# Patient Record
Sex: MALE | Race: BLACK OR AFRICAN AMERICAN
[De-identification: names, ages, dates, MRNs, and addresses within clinical notes are randomized per-mention and may not be internally consistent; named-entity substitution may affect disease eponyms.]

---

## 2017-03-30 ENCOUNTER — HOSPITAL ENCOUNTER (INPATIENT)
Dept: HOSPITAL 17 - NEPC | Age: 72
LOS: 7 days | Discharge: HOME | DRG: 315 | End: 2017-04-06
Attending: HOSPITALIST | Admitting: HOSPITALIST
Payer: COMMERCIAL

## 2017-03-30 VITALS — OXYGEN SATURATION: 96 %

## 2017-03-30 VITALS
SYSTOLIC BLOOD PRESSURE: 131 MMHG | TEMPERATURE: 97.9 F | DIASTOLIC BLOOD PRESSURE: 63 MMHG | HEART RATE: 51 BPM | RESPIRATION RATE: 18 BRPM | OXYGEN SATURATION: 97 %

## 2017-03-30 VITALS
RESPIRATION RATE: 18 BRPM | OXYGEN SATURATION: 97 % | HEART RATE: 66 BPM | SYSTOLIC BLOOD PRESSURE: 137 MMHG | DIASTOLIC BLOOD PRESSURE: 81 MMHG

## 2017-03-30 VITALS — WEIGHT: 124.78 LBS | BODY MASS INDEX: 16.9 KG/M2 | HEIGHT: 72 IN

## 2017-03-30 VITALS
SYSTOLIC BLOOD PRESSURE: 130 MMHG | RESPIRATION RATE: 16 BRPM | DIASTOLIC BLOOD PRESSURE: 69 MMHG | OXYGEN SATURATION: 96 % | HEART RATE: 62 BPM | TEMPERATURE: 97.8 F

## 2017-03-30 VITALS — DIASTOLIC BLOOD PRESSURE: 85 MMHG | SYSTOLIC BLOOD PRESSURE: 123 MMHG

## 2017-03-30 VITALS
OXYGEN SATURATION: 97 % | RESPIRATION RATE: 18 BRPM | TEMPERATURE: 99.7 F | SYSTOLIC BLOOD PRESSURE: 103 MMHG | DIASTOLIC BLOOD PRESSURE: 60 MMHG | HEART RATE: 91 BPM

## 2017-03-30 VITALS — OXYGEN SATURATION: 98 %

## 2017-03-30 VITALS
OXYGEN SATURATION: 97 % | SYSTOLIC BLOOD PRESSURE: 143 MMHG | RESPIRATION RATE: 18 BRPM | HEART RATE: 82 BPM | DIASTOLIC BLOOD PRESSURE: 63 MMHG

## 2017-03-30 VITALS
RESPIRATION RATE: 18 BRPM | OXYGEN SATURATION: 98 % | SYSTOLIC BLOOD PRESSURE: 96 MMHG | DIASTOLIC BLOOD PRESSURE: 57 MMHG | HEART RATE: 68 BPM

## 2017-03-30 VITALS — OXYGEN SATURATION: 99 %

## 2017-03-30 VITALS — HEART RATE: 53 BPM

## 2017-03-30 DIAGNOSIS — H91.90: ICD-10-CM

## 2017-03-30 DIAGNOSIS — Z79.82: ICD-10-CM

## 2017-03-30 DIAGNOSIS — E87.1: ICD-10-CM

## 2017-03-30 DIAGNOSIS — I25.119: ICD-10-CM

## 2017-03-30 DIAGNOSIS — E78.5: ICD-10-CM

## 2017-03-30 DIAGNOSIS — H26.9: ICD-10-CM

## 2017-03-30 DIAGNOSIS — E11.9: ICD-10-CM

## 2017-03-30 DIAGNOSIS — J44.1: ICD-10-CM

## 2017-03-30 DIAGNOSIS — Z79.02: ICD-10-CM

## 2017-03-30 DIAGNOSIS — H54.8: ICD-10-CM

## 2017-03-30 DIAGNOSIS — R07.89: ICD-10-CM

## 2017-03-30 DIAGNOSIS — I42.9: Primary | ICD-10-CM

## 2017-03-30 DIAGNOSIS — I10: ICD-10-CM

## 2017-03-30 LAB
ALP SERPL-CCNC: 156 U/L (ref 45–117)
ALT SERPL-CCNC: 17 U/L (ref 12–78)
ANION GAP SERPL CALC-SCNC: 7 MEQ/L (ref 5–15)
AST SERPL-CCNC: 15 U/L (ref 15–37)
BASOPHILS # BLD AUTO: 0 TH/MM3 (ref 0–0.2)
BASOPHILS NFR BLD: 0.9 % (ref 0–2)
BILIRUB SERPL-MCNC: 0.4 MG/DL (ref 0.2–1)
BUN SERPL-MCNC: 6 MG/DL (ref 7–18)
CHLORIDE SERPL-SCNC: 93 MEQ/L (ref 98–107)
CK SERPL-CCNC: 64 U/L (ref 39–308)
CK SERPL-CCNC: 75 U/L (ref 39–308)
EOSINOPHIL # BLD: 1.6 TH/MM3 (ref 0–0.4)
EOSINOPHIL NFR BLD: 28.6 % (ref 0–4)
ERYTHROCYTE [DISTWIDTH] IN BLOOD BY AUTOMATED COUNT: 15.6 % (ref 11.6–17.2)
GFR SERPLBLD BASED ON 1.73 SQ M-ARVRAT: 106 ML/MIN (ref 89–?)
HCO3 BLD-SCNC: 26.8 MEQ/L (ref 21–32)
HCT VFR BLD CALC: 35.6 % (ref 39–51)
HEMO FLAGS: (no result)
LYMPHOCYTES # BLD AUTO: 2.2 TH/MM3 (ref 1–4.8)
LYMPHOCYTES NFR BLD AUTO: 40.4 % (ref 9–44)
MCH RBC QN AUTO: 28.2 PG (ref 27–34)
MCHC RBC AUTO-ENTMCNC: 34.5 % (ref 32–36)
MCV RBC AUTO: 81.7 FL (ref 80–100)
MONOCYTES NFR BLD: 11.4 % (ref 0–8)
NEUTROPHILS # BLD AUTO: 1 TH/MM3 (ref 1.8–7.7)
NEUTROPHILS NFR BLD AUTO: 18.7 % (ref 16–70)
PLATELET # BLD: 167 TH/MM3 (ref 150–450)
POTASSIUM SERPL-SCNC: 4.5 MEQ/L (ref 3.5–5.1)
RBC # BLD AUTO: 4.36 MIL/MM3 (ref 4.5–5.9)
SODIUM SERPL-SCNC: 127 MEQ/L (ref 136–145)
WBC # BLD AUTO: 5.5 TH/MM3 (ref 4–11)

## 2017-03-30 PROCEDURE — 83930 ASSAY OF BLOOD OSMOLALITY: CPT

## 2017-03-30 PROCEDURE — 80048 BASIC METABOLIC PNL TOTAL CA: CPT

## 2017-03-30 PROCEDURE — 99285 EMERGENCY DEPT VISIT HI MDM: CPT

## 2017-03-30 PROCEDURE — 81003 URINALYSIS AUTO W/O SCOPE: CPT

## 2017-03-30 PROCEDURE — 84484 ASSAY OF TROPONIN QUANT: CPT

## 2017-03-30 PROCEDURE — 84443 ASSAY THYROID STIM HORMONE: CPT

## 2017-03-30 PROCEDURE — 71010: CPT

## 2017-03-30 PROCEDURE — 94640 AIRWAY INHALATION TREATMENT: CPT

## 2017-03-30 PROCEDURE — 84295 ASSAY OF SERUM SODIUM: CPT

## 2017-03-30 PROCEDURE — 83935 ASSAY OF URINE OSMOLALITY: CPT

## 2017-03-30 PROCEDURE — A9502 TC99M TETROFOSMIN: HCPCS

## 2017-03-30 PROCEDURE — 93306 TTE W/DOPPLER COMPLETE: CPT

## 2017-03-30 PROCEDURE — G0378 HOSPITAL OBSERVATION PER HR: HCPCS

## 2017-03-30 PROCEDURE — 84300 ASSAY OF URINE SODIUM: CPT

## 2017-03-30 PROCEDURE — 94664 DEMO&/EVAL PT USE INHALER: CPT

## 2017-03-30 PROCEDURE — 82088 ASSAY OF ALDOSTERONE: CPT

## 2017-03-30 PROCEDURE — 78452 HT MUSCLE IMAGE SPECT MULT: CPT

## 2017-03-30 PROCEDURE — 82024 ASSAY OF ACTH: CPT

## 2017-03-30 PROCEDURE — 82533 TOTAL CORTISOL: CPT

## 2017-03-30 PROCEDURE — 82550 ASSAY OF CK (CPK): CPT

## 2017-03-30 PROCEDURE — 80053 COMPREHEN METABOLIC PANEL: CPT

## 2017-03-30 PROCEDURE — 85025 COMPLETE CBC W/AUTO DIFF WBC: CPT

## 2017-03-30 PROCEDURE — 84244 ASSAY OF RENIN: CPT

## 2017-03-30 PROCEDURE — 93005 ELECTROCARDIOGRAM TRACING: CPT

## 2017-03-30 PROCEDURE — 93017 CV STRESS TEST TRACING ONLY: CPT

## 2017-03-30 RX ADMIN — IPRATROPIUM BROMIDE AND ALBUTEROL SULFATE SCH AMPULE: .5; 3 SOLUTION RESPIRATORY (INHALATION) at 13:29

## 2017-03-30 RX ADMIN — IPRATROPIUM BROMIDE AND ALBUTEROL SULFATE SCH AMPULE: .5; 3 SOLUTION RESPIRATORY (INHALATION) at 20:26

## 2017-03-30 RX ADMIN — ASPIRIN SCH MG: 325 TABLET ORAL at 09:47

## 2017-03-30 RX ADMIN — PHENYTOIN SODIUM SCH MLS/HR: 50 INJECTION INTRAMUSCULAR; INTRAVENOUS at 12:59

## 2017-03-30 NOTE — HHI.HP
HPI


Primary Care Physician


Non-Staff


Chief Complaint


Chest pain


History of Present Illness


72-year-old male presents to the emergency room for further evaluation of chest 

discomfort.  Patient is vague with symptoms however does endorse a cough for 

the past 2-3days.  States substernal chest pain only occurs with coughing.  

Seen PCP on Monday was given Levaquin and inhalers, was told he has a lung 

infection.  Yesterday endorses generalized weakness, usually uses a push walker 

or cane, however yesterday was too tired and fatigued to do either.  Recently 

had dental work completed therefore taking a soft diet consisting of oatmeal 

and mashed potatoes.  Continues to have good appetite otherwise.  No known 

fever however endorses chills since yesterday.





Review of Systems


General: Generalized weakness 1 days.  Seen by PCP on Monday prescribed 

antibiotic for upper respiratory infection.  No fevers, positive for chills 1 

day.  Lives with his daughter.  No family is at bedside at this time.


HEENT: No HA.  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


CV: As stated above.  States he has chest pain only with coughing 3 days.  No 

radiation of chest pain, precipitating factors cough, relieving factors not 

coughing.  No associated symptoms  


RESP: No current SOB.  Endorses cough for a few days.  Coughing up phlegm.  

History of COPD. using inhalers provided by his PCP.


GI: No nausea, vomiting, bowel changes, or decreased appetite.  Taking a soft 

diet for the past week due to dental caries removed.


: No dysuria, urgency, frequency


MS: No discomfort or change in ROM, normally uses a cane or wheeled walker with 

ambulation.


NEURO: No change in memory, dizziness, difficulty with balance, LOC, motor/

sensory deficits


SKIN: No rashes, no concerning lesions





Past Family Social History


Allergies:  


Coded Allergies:  


     Contrast Media (Verified  Allergy, Mild, HIVES, ITCHING, 3/30/17)


Past Medical History


Hyperlipidemia, hypertension, diabetes, COPD, glaucoma


Reported Medications





Reported Meds & Active Scripts


Active


Cozaar (Losartan Potassium) 25 Mg Tab 6.25 Mg PO DAILY


Levaquin (Levofloxacin) 500 Mg Tab 500 Mg PO DAILY@21


Potassium Chloride Microencaps 20 Meq Tab 20 Meq PO Q12HR 10 Days


Chlorhexidine Gluconate (Mouth) Liq (Chlorhexidine Gluconate) 0.12% Soln 15 Ml 

MT BID@08,20 30 Days


Plavix (Clopidogrel Bisulfate) 75 Mg Tab 75 Mg PO DAILY 30 Days


Reported


Fluticasone Nasal Spray 50 Mcg/Act Naspr 50 Mcg EACH NARE BID


     50 mcg/spray


Dorzolamide-Timolol Opth Drops 22.3-6.8 Mg/Ml Soln 1 Drop EACH EYE BID


Spiriva Respimat Inh (Tiotropium Inh) 2.5 Mcg/Act Aero 2 Puff INH DAILY


     2.5 mcg = 1 inhalation


Atorvastatin (Atorvastatin Calcium) 80 Mg Tab 80 Mg PO HS


Trusopt (Dorzolamide HCl) 10 Ml Soln 1 Drop EACH EYE BID


Ventolin Hfa (Albuterol Sulfate) 8 Gm Aero 2 Puff INH Q4 PRN


     * SHAKE WELL BEFORE USE *


Brimonidine Tartrate 0.2 % Sol 1 Drop  BID


Active Ordered Medications





 Current Medications








 Medications


  (Trade)  Dose


 Ordered  Sig/Kaia


 Route  Start Time


 Stop Time Status Last Admin


 


  (Nitrostat Sl)  0.4 mg  Q5M  PRN


 SL  3/30/17 06:15


     


 


 


  (Aspirin)  325 mg  DAILY


 PO  3/30/17 09:00


    3/30/17 09:47


 


 


  (Tylenol)  500 mg  Q4H  PRN


 PO  3/30/17 08:45


     


 


 


  (Zofran Inj)  4 mg  Q6H  PRN


 IV  3/30/17 08:45


     


 








Social History


Past cardiac testing


1/3/16 cardiac catheterization





Physical Exam


Vital Signs





 Vital Signs








  Date Time  Temp Pulse Resp B/P Pulse Ox O2 Delivery O2 Flow Rate FiO2


 


3/30/17 09:30  66 18 137/81 97 Room Air  


 


3/30/17 07:00  68 18 96/57 98 Room Air  


 


3/30/17 06:34     99 Room Air  


 


3/30/17 06:34     99 Room Air  


 


3/30/17 06:10     96   


 


3/30/17 02:53 97.8 62 16 130/69 96 Room Air  








Physical Exam


GENERAL: Alert WN, WD, NAD, pleasant, hard of hearing,  male


HEAD: NC, AT


EYES: Sclera clear, conjunctiva without injection, pupils equal and round


NECK: Supple, no masses, trachea midline


CV: RRR, without murmur, rub, gallop, no JVD, S1-S2 no S3-S4.  


RESP: Clear lungs throughout bilateral, no crackles, wheeze, rhonchi, 

symmetrical chest rise, nonlabored, able to speak in full sentences


ABD: Soft, NT, ND, flat, no masses, positive bowel tones


EXT: Pulses +24, no dependent edema


MS: Normal tone 4 extremities, nontender, no obvious deformities, full range 

of motion


NEURO: CN II through CN XII grossly intact, motor strength 5/5, gait WNL


PSYCH: A+O 3, pleasant affect, appropriate speech, appropriate mood and affect

, insight and judgment


SKIN: Normal turgor, normal texture, no lesions, no rashes


Laboratory





Laboratory Tests








Test 3/30/17 3/30/17





 04:05 07:45


 


White Blood Count 5.5  


 


Red Blood Count 4.36  


 


Hemoglobin 12.3  


 


Hematocrit 35.6  


 


Mean Corpuscular Volume 81.7  


 


Mean Corpuscular Hemoglobin 28.2  


 


Mean Corpuscular Hemoglobin 34.5  





Concent  


 


Red Cell Distribution Width 15.6  


 


Platelet Count 167  


 


Mean Platelet Volume 7.6  


 


Neutrophils (%) (Auto) 18.7  


 


Lymphocytes (%) (Auto) 40.4  


 


Monocytes (%) (Auto) 11.4  


 


Eosinophils (%) (Auto) 28.6  


 


Basophils (%) (Auto) 0.9  


 


Neutrophils # (Auto) 1.0  


 


Lymphocytes # (Auto) 2.2  


 


Monocytes # (Auto) 0.6  


 


Eosinophils # (Auto) 1.6  


 


Basophils # (Auto) 0.0  


 


CBC Comment DIFF FINAL  


 


Differential Comment   


 


Sodium Level 127  


 


Potassium Level 4.5  


 


Chloride Level 93  


 


Carbon Dioxide Level 26.8  


 


Anion Gap 7  


 


Blood Urea Nitrogen 6  


 


Creatinine 0.86  


 


Estimat Glomerular Filtration 106  





Rate  


 


Random Glucose 92  


 


Calcium Level 8.4  


 


Total Bilirubin 0.4  


 


Aspartate Amino Transf 15  





(AST/SGOT)  


 


Alanine Aminotransferase 17  





(ALT/SGPT)  


 


Alkaline Phosphatase 156  


 


Troponin I LESS THAN 0.02  LESS THAN 0.02 


 


Total Protein 6.6  


 


Albumin 2.9  


 


Total Creatine Kinase  64 








Result Diagram:  


3/30/17 0405                                                                   

             3/30/17 0405





Imaging





Last Impressions








Chest X-Ray 3/30/17 0330 Signed





Impressions: 





 Service Date/Time:  Thursday, March 30, 2017 03:53 - CONCLUSION: No acute 





 disease.       Gurwinder Craven MD 








Course


EKG


First and second EKG shows normal sinus bradycardia, anterior septal MI





Assessment and Plan


Assessment and Plan


#1 Chest painadmitted to chest pain center.  Will complete 3 sets of EKGs and 

cardiac enzymes.  Was seen and evaluated by Dr. Leatha Mark.  Original 

plan was to complete a chemical stress test if ruled out, however patient meets 

inpatient criteria, per case management.  Will defer any further cardiac 

testing to attending physician.  


#2 Hyponatremia1 L normal saline bolus provided in ED. case management states 

patient meets inpatient criteria for hyponatremia.  Consult placed for 

hospitalist.








Tiffanie Sawant Mar 30, 2017 11:26

## 2017-03-30 NOTE — EKG
Date Performed: 2017       Time Performed: 11:06:48

 

PTAGE:      72 years

 

EKG:      Sinus rhythm 

 

 WITH FIRST DEGREE AV BLOCK ANTEROSEPTAL MYOCARDIAL INFARCTION ABNORMAL ECG Since 

 

 PREVIOUS TRACING            , no significant change noted PREVIOUS TRACIN2017 07.55

 

DOCTOR:   Leatha Mark  Interpretating Date/Time  2017 17:33:43

## 2017-03-30 NOTE — HHI.PR
Subjective


Remarks


complaining of some dry cough and occasional sob.


initially presented with some chest pain.


afebrile.





Objective


Vitals





 Vital Signs








  Date Time  Temp Pulse Resp B/P Pulse Ox O2 Delivery O2 Flow Rate FiO2


 


3/30/17 11:30  82 18 143/63 97 Room Air  


 


3/30/17 09:30  66 18 137/81 97 Room Air  


 


3/30/17 07:00  68 18 96/57 98 Room Air  


 


3/30/17 06:34     99 Room Air  


 


3/30/17 06:34     99 Room Air  


 


3/30/17 06:10     96   


 


3/30/17 02:53 97.8 62 16 130/69 96 Room Air  








Result Diagram:  


3/30/17 0405                                                                   

             3/30/17 0405





Imaging





Last Impressions








Chest X-Ray 3/30/17 0330 Signed





Impressions: 





 Service Date/Time:  Thursday, March 30, 2017 03:53 - CONCLUSION: No acute 





 disease.       Gurwinder Craven MD 








Objective Remarks


GENERAL: This is a well-nourished, well-developed patient, in no apparent 

distress.


CARDIOVASCULAR: Regular rate and regular rhythm without murmurs, gallops, or 

rubs. 


RESPIRATORY: diminished air entry in bases


GASTROINTESTINAL: Abdomen soft, non-tender, nondistended. 


Normal, active bowel sounds


MUSCULOSKELETAL: Extremities without clubbing, cyanosis, or edema.


NEURO:  Alert & Oriented x4 to person, place, time, situation.  Moves all ext x4


Procedures


none


Medications and IVs





 Current Medications


Sodium Chloride 2 ml 2 ml UNSCH  PRN IVF FLUSH AFTER USING IV ACCESS;  Start 3/

30/17 at 03:30


Sodium Chloride (NS 1000 ml Inj) 1,000 ml @  999 mls/hr BOLUS  ONCE IV  Last 

administered on 3/30/17at 05:42;  Start 3/30/17 at 05:15;  Stop 3/30/17 at 06:15

;  Status DC


Nitroglycerin (Nitrostat Sl) 0.4 mg Q5M  PRN SL CHEST PAIN;  Start 3/30/17 at 06

:15


Aspirin (Aspirin) 325 mg DAILY PO  Last administered on 3/30/17at 09:47;  Start 

3/30/17 at 09:00


Acetaminophen (Tylenol) 500 mg Q4H  PRN PO HEADACHE;  Start 3/30/17 at 08:45


Ondansetron HCl (Zofran Inj) 4 mg Q6H  PRN IV NAUSEA;  Start 3/30/17 at 08:45





A/P


Assessment and Plan


A/P





- chest pain; 


  serial cardiac enzymes negative- will do stress test tomorrow.


-hyponatremia; start gentle IV fluid- check UA and TSH- will repeat BMP in am


-mild exacerbation of COPD; continue spiriva- start neb treatment- will monitor


-hypertension;; resume losartan- will monitor and adjust the regimen as needed


-dyslipidemia; on statin


-DVT prophylaxis with lovenox








Ele Villasenor MD Mar 30, 2017 12:28

## 2017-03-30 NOTE — PD
HPI


Chief Complaint:  General Weakness


Time Seen by Provider:  03:30


Travel History


International Travel<30 days:  No


Contact w/Intl Traveler<30days:  No


Traveled to known affect area:  No





History of Present Illness


HPI


76-year-old male arrives due to a state of generalized weakness for a couple 

days.  The patient has been coughing for about 2 days.  He was prescribed 

Levaquin and cannot Inderal inhaler.  It seems marginally beneficial.  He uses 

an albuterol nebulizer at home 3 times daily.  There has been no fever.  

Occasionally the patient coughs phlegm.  He has COPD.  He is recently had his 

teeth pulled on his oral intake has been decreased.  He is only drinking 

liquids.the patient's granddaughter provides most of the history and states she 

"just wants to be sure everything is okay." + HLD, +HTN, +DM, quit smoking 

tobacco.





PFSH


Past Medical History


Asthma:  Yes


Cardiovascular Problems:  Yes


High Cholesterol:  Yes


Chest Pain:  Yes


COPD:  Yes


Diabetes:  Yes


Patient Takes Glucophage:  No


Diminished Hearing:  Yes ([place])


Gastrointestinal Disorders:  No


Glaucoma:  Yes


Genitourinary:  Yes


Hypertension:  Yes


Musculoskeletal:  Yes


Neurologic:  No


Respiratory:  Yes (COPD)





Past Surgical History


Ear Surgery:  Yes (BILATERAL CATARACT REMOVAL)


Eye Surgery:  Yes (CATARACT REMOVAL)





Social History


Alcohol Use:  No


Tobacco Use:  No (stop )


Substance Use:  No





Allergies-Medications


(Allergen,Severity, Reaction):  


Coded Allergies:  


     Contrast Media (Verified  Allergy, Mild, HIVES, ITCHING, 3/30/17)


Reported Meds & Prescriptions





Reported Meds & Active Scripts


Active


Cozaar (Losartan Potassium) 25 Mg Tab 6.25 Mg PO DAILY


Levaquin (Levofloxacin) 500 Mg Tab 500 Mg PO DAILY@21


Potassium Chloride Microencaps 20 Meq Tab 20 Meq PO Q12HR 10 Days


Chlorhexidine Gluconate (Mouth) Liq (Chlorhexidine Gluconate) 0.12% Soln 15 Ml 

MT BID@08,20 30 Days


Plavix (Clopidogrel Bisulfate) 75 Mg Tab 75 Mg PO DAILY 30 Days


Reported


Fluticasone Nasal Spray 50 Mcg/Act Naspr 50 Mcg EACH NARE BID


     50 mcg/spray


Dorzolamide-Timolol Opth Drops 22.3-6.8 Mg/Ml Soln 1 Drop EACH EYE BID


Spiriva Respimat Inh (Tiotropium Inh) 2.5 Mcg/Act Aero 2 Puff INH DAILY


     2.5 mcg = 1 inhalation


Atorvastatin (Atorvastatin Calcium) 80 Mg Tab 80 Mg PO HS


Trusopt (Dorzolamide HCl) 10 Ml Soln 1 Drop EACH EYE BID


Ventolin Hfa (Albuterol Sulfate) 8 Gm Aero 2 Puff INH Q4 PRN


     * SHAKE WELL BEFORE USE *


Brimonidine Tar0.21 0.2 % Sol 1 Drop  BID








Review of Systems


Except as stated in HPI:  all other systems reviewed are Neg


General / Constitutional:  No: Fever


Respiratory:  Positive: Cough





Physical Exam


Narrative


GENERAL: Thin 72-year-old male no acute distress speaking sentences


SKIN: Warm and dry.


HEAD: Atraumatic. Normocephalic. 


EYES: Pupils equal and round. No scleral icterus. No injection or drainage. 


ENT: No nasal bleeding or discharge.  Mucous membranes pink and moist.


NECK: Trachea midline. No JVD. 


CARDIOVASCULAR: Regular rate and rhythm.  


RESPIRATORY: Diminished lung sounds diffusely.  No tachypnea.


GASTROINTESTINAL: Abdomen soft, non-tender, nondistended. Hepatic and splenic 

margins not palpable. 


MUSCULOSKELETAL: Extremities without clubbing, cyanosis, or edema. No obvious 

deformities. 


NEUROLOGICAL: Awake and alert. No obvious cranial nerve deficits.  Motor 

grossly within normal limits. Five out of 5 muscle strength in the arms and 

legs.  Normal speech.


PSYCHIATRIC: Appropriate mood and affect; insight and judgment normal.





Data


Data


Last Documented VS





Vital Signs








  Date Time  Temp Pulse Resp B/P Pulse Ox O2 Delivery O2 Flow Rate FiO2


 


3/30/17 02:53 97.8 62 16 130/69 96 Room Air  








Orders





 Complete Blood Count With Diff (3/30/17 03:30)


Comprehensive Metabolic Panel (3/30/17 03:30)


Iv Access Insert/Monitor (3/30/17 03:30)


Ecg Monitoring (3/30/17 03:30)


Oximetry (3/30/17 03:30)


Oxygen Administration (3/30/17 03:30)


Chest, Single Ap (3/30/17 03:30)


Sodium Chloride 0.9% Flush (Ns Flush) (3/30/17 03:30)


Troponin I (3/30/17 04:05)


Sodium Chlor 0.9% 1000 Ml Inj (Ns 1000 M (3/30/17 05:15)


Activity Bed Rest With Brp (3/30/17 06:04)


Vital Signs (Adult) Q4H (3/30/17 06:04)


Cardiac Rhythm .As Directed (3/30/17 06:04)


^ Notify Dr: Other .PRN (3/30/17 06:04)


^ Notify DrJoyce Parameters (3/30/17 06:04)


Resp Oxygen Nasal Cannula (3/30/17 )


Ckmb (Isoenzyme) Profile (3/30/17 06:04)


Ckmb (Isoenzyme) Profile (3/30/17 09:04)


Troponin I (3/30/17 06:04)


Troponin I (3/30/17 09:04)


Electrocardiogram (3/30/17 06:04)


Electrocardiogram (3/30/17 09:04)


^ Obtain (3/30/17 06:04)


Nitroglycerin Sl (Nitrostat Sl) (3/30/17 06:15)


Aspirin (Aspirin) (3/30/17 09:00)


Cardiac Monitor / Telemetry SONIA.Q8H (3/30/17 06:04)


Admit Order (Ed Use Only) (3/30/17 06:04)





Labs








 Laboratory Tests








Test 3/30/17





 04:05


 


White Blood Count 5.5 TH/MM3


 


Red Blood Count 4.36 MIL/MM3


 


Hemoglobin 12.3 GM/DL


 


Hematocrit 35.6 %


 


Mean Corpuscular Volume 81.7 FL


 


Mean Corpuscular Hemoglobin 28.2 PG


 


Mean Corpuscular Hemoglobin 34.5 %





Concent 


 


Red Cell Distribution Width 15.6 %


 


Platelet Count 167 TH/MM3


 


Mean Platelet Volume 7.6 FL


 


Neutrophils (%) (Auto) 18.7 %


 


Lymphocytes (%) (Auto) 40.4 %


 


Monocytes (%) (Auto) 11.4 %


 


Eosinophils (%) (Auto) 28.6 %


 


Basophils (%) (Auto) 0.9 %


 


Neutrophils # (Auto) 1.0 TH/MM3


 


Lymphocytes # (Auto) 2.2 TH/MM3


 


Monocytes # (Auto) 0.6 TH/MM3


 


Eosinophils # (Auto) 1.6 TH/MM3


 


Basophils # (Auto) 0.0 TH/MM3


 


CBC Comment DIFF FINAL 


 


Differential Comment  


 


Sodium Level 127 MEQ/L


 


Potassium Level 4.5 MEQ/L


 


Chloride Level 93 MEQ/L


 


Carbon Dioxide Level 26.8 MEQ/L


 


Anion Gap 7 MEQ/L


 


Blood Urea Nitrogen 6 MG/DL


 


Creatinine 0.86 MG/DL


 


Estimat Glomerular Filtration 106 ML/MIN





Rate 


 


Random Glucose 92 MG/DL


 


Calcium Level 8.4 MG/DL


 


Total Bilirubin 0.4 MG/DL


 


Aspartate Amino Transf 15 U/L





(AST/SGOT) 


 


Alanine Aminotransferase 17 U/L





(ALT/SGPT) 


 


Alkaline Phosphatase 156 U/L


 


Troponin I LESS THAN 0.02





 NG/ML


 


Total Protein 6.6 GM/DL


 


Albumin 2.9 GM/DL














MDM


Medical Decision Making


Medical Screen Exam Complete:  Yes


Emergency Medical Condition:  Yes


Medical Record Reviewed:  Yes


Differential Diagnosis


Anemia, renal failure, pneumonia, COPD, CAD


Narrative Course


EKG: Sinus, inferior lead ST depressions, rate 52


CBC & BMP Diagram


3/30/17 04:05








LFTs essentially normal


Tn < 0.02








Last 24 hours Impressions








Chest X-Ray 3/30/17 0330 Signed





Impressions: 





 Service Date/Time:  Thursday, March 30, 2017 03:53 - CONCLUSION: No acute 





 disease.       Gurwinder Craven MD 





Patient has ST changes of concern.  Chest pain center evaluation considered 

most appropriate.  Patient did receive a liter of saline here for the mild 

hyponatremia.





Diagnosis





 Primary Impression:  


 CAD (coronary artery disease)


 Qualified Code:  I25.10 - Coronary artery disease involving native heart, 

angina presence unspecified, unspecified vessel or lesion type


 Additional Impressions:  


 COPD (chronic obstructive pulmonary disease)


 Qualified Code:  J43.9 - Pulmonary emphysema, unspecified emphysema type


 Weakness





Admitting Information


Admitting Physician Requests:  Observation








Geoffrey Cristina MD Mar 30, 2017 03:50

## 2017-03-30 NOTE — RADRPT
EXAM DATE/TIME:  03/30/2017 03:53 

 

[facility] COMPARISON:     

CHEST SINGLE AP, December 22, 2016, 5:33.

 

                     

INDICATIONS :     

Shortness of breath.

                     

 

MEDICAL HISTORY :            

Hypertension. Chronic obstructive pulmonary disease.   

 

SURGICAL HISTORY :     

None.   

 

ENCOUNTER:     

Initial                                        

 

ACUITY:     

1 day      

 

PAIN SCORE:     

0/10

 

LOCATION:     

Bilateral  chest

 

FINDINGS:     

A single view of the chest demonstrates the lungs to be symmetrically aerated without evidence of mas
s, infiltrate or effusion.  The cardiomediastinal contours are unremarkable.  Osseous structures are 
intact.

 

CONCLUSION:     No acute disease.  

 

 

 

 Gurwinder Craven MD on March 30, 2017 at 4:15           

Board Certified Radiologist.

 This report was verified electronically.

## 2017-03-30 NOTE — EKG
Date Performed: 2017       Time Performed: 07:55:46

 

PTAGE:      72 years

 

EKG:      SINUS BRADYCARDIA ANTEROSEPTAL MYOCARDIAL INFARCTION ABNORMAL ECG Since 

 

 PREVIOUS TRACING            , no significant change noted PREVIOUS TRACIN2017 04.10

 

DOCTOR:   Leatha Mark  Interpretating Date/Time  2017 17:35:53

## 2017-03-30 NOTE — EKG
Date Performed: 2017       Time Performed: 04:10:12

 

PTAGE:      72 years

 

EKG:      SINUS BRADYCARDIA WITH SINUS ARRHYTHMIA WITH FIRST DEGREE AV BLOCK ANTEROSEPTAL MYOCARDIAL 
INFARCTION ABNORMAL ECG Since 

 

 PREVIOUS TRACING            , no significant change noted PREVIOUS TRACIN2016 18.02

 

DOCTOR:   Leatha Mark  Interpretating Date/Time  2017 21:35:30

## 2017-03-31 VITALS
HEART RATE: 66 BPM | OXYGEN SATURATION: 96 % | RESPIRATION RATE: 18 BRPM | SYSTOLIC BLOOD PRESSURE: 100 MMHG | DIASTOLIC BLOOD PRESSURE: 63 MMHG | TEMPERATURE: 97.2 F

## 2017-03-31 VITALS
DIASTOLIC BLOOD PRESSURE: 77 MMHG | HEART RATE: 66 BPM | OXYGEN SATURATION: 95 % | TEMPERATURE: 97.8 F | RESPIRATION RATE: 18 BRPM | SYSTOLIC BLOOD PRESSURE: 155 MMHG

## 2017-03-31 VITALS
SYSTOLIC BLOOD PRESSURE: 101 MMHG | TEMPERATURE: 97.5 F | HEART RATE: 70 BPM | DIASTOLIC BLOOD PRESSURE: 68 MMHG | RESPIRATION RATE: 18 BRPM | OXYGEN SATURATION: 95 %

## 2017-03-31 VITALS
TEMPERATURE: 97.4 F | DIASTOLIC BLOOD PRESSURE: 68 MMHG | SYSTOLIC BLOOD PRESSURE: 117 MMHG | HEART RATE: 60 BPM | RESPIRATION RATE: 18 BRPM | OXYGEN SATURATION: 97 %

## 2017-03-31 VITALS
HEART RATE: 77 BPM | SYSTOLIC BLOOD PRESSURE: 114 MMHG | DIASTOLIC BLOOD PRESSURE: 77 MMHG | OXYGEN SATURATION: 94 % | TEMPERATURE: 97.3 F | RESPIRATION RATE: 18 BRPM

## 2017-03-31 VITALS
OXYGEN SATURATION: 95 % | HEART RATE: 76 BPM | TEMPERATURE: 97.7 F | SYSTOLIC BLOOD PRESSURE: 161 MMHG | DIASTOLIC BLOOD PRESSURE: 72 MMHG | RESPIRATION RATE: 20 BRPM

## 2017-03-31 VITALS — HEART RATE: 64 BPM

## 2017-03-31 LAB
ANION GAP SERPL CALC-SCNC: 6 MEQ/L (ref 5–15)
BUN SERPL-MCNC: 5 MG/DL (ref 7–18)
CHLORIDE SERPL-SCNC: 93 MEQ/L (ref 98–107)
GFR SERPLBLD BASED ON 1.73 SQ M-ARVRAT: 120 ML/MIN (ref 89–?)
HCO3 BLD-SCNC: 27.4 MEQ/L (ref 21–32)
POTASSIUM SERPL-SCNC: 4.4 MEQ/L (ref 3.5–5.1)
SODIUM SERPL-SCNC: 126 MEQ/L (ref 136–145)

## 2017-03-31 RX ADMIN — ASPIRIN SCH MG: 325 TABLET ORAL at 08:22

## 2017-03-31 RX ADMIN — PHENYTOIN SODIUM SCH MLS/HR: 50 INJECTION INTRAMUSCULAR; INTRAVENOUS at 17:53

## 2017-03-31 RX ADMIN — POTASSIUM CHLORIDE SCH MEQ: 20 TABLET, EXTENDED RELEASE ORAL at 00:06

## 2017-03-31 RX ADMIN — FLUTICASONE PROPIONATE SCH SPRAY: 50 SPRAY, METERED NASAL at 00:08

## 2017-03-31 RX ADMIN — PHENYTOIN SODIUM SCH MLS/HR: 50 INJECTION INTRAMUSCULAR; INTRAVENOUS at 02:20

## 2017-03-31 RX ADMIN — POTASSIUM CHLORIDE SCH MEQ: 20 TABLET, EXTENDED RELEASE ORAL at 08:22

## 2017-03-31 RX ADMIN — ATORVASTATIN CALCIUM SCH MG: 80 TABLET, FILM COATED ORAL at 21:50

## 2017-03-31 RX ADMIN — DORZOLAMIDE HYDROCHLORIDE AND TIMOLOL MALEATE SCH DROP: 20; 5 SOLUTION OPHTHALMIC at 08:28

## 2017-03-31 RX ADMIN — POTASSIUM CHLORIDE SCH MEQ: 20 TABLET, EXTENDED RELEASE ORAL at 21:50

## 2017-03-31 RX ADMIN — FLUTICASONE PROPIONATE SCH SPRAY: 50 SPRAY, METERED NASAL at 08:28

## 2017-03-31 RX ADMIN — DORZOLAMIDE HYDROCHLORIDE AND TIMOLOL MALEATE SCH DROP: 20; 5 SOLUTION OPHTHALMIC at 21:50

## 2017-03-31 RX ADMIN — LOSARTAN POTASSIUM SCH MG: 25 TABLET, FILM COATED ORAL at 08:22

## 2017-03-31 RX ADMIN — PHENYTOIN SODIUM SCH MLS/HR: 50 INJECTION INTRAMUSCULAR; INTRAVENOUS at 21:50

## 2017-03-31 RX ADMIN — ATORVASTATIN CALCIUM SCH MG: 80 TABLET, FILM COATED ORAL at 00:06

## 2017-03-31 RX ADMIN — DORZOLAMIDE HYDROCHLORIDE AND TIMOLOL MALEATE SCH DROP: 20; 5 SOLUTION OPHTHALMIC at 00:08

## 2017-03-31 RX ADMIN — FLUTICASONE PROPIONATE SCH SPRAY: 50 SPRAY, METERED NASAL at 21:50

## 2017-03-31 NOTE — HHI.PR
Subjective


Remarks


resting comfortably with no distress.


denies chest pain or sob.


no new complaints.





Objective


Vitals





 Vital Signs








  Date Time  Temp Pulse Resp B/P Pulse Ox O2 Delivery O2 Flow Rate FiO2


 


3/31/17 08:00 97.7 76 20 161/72 95   


 


3/31/17 04:00 97.8 66 18 155/77 95   


 


3/31/17 00:00 97.4 60 18 117/68 97   


 


3/30/17 20:31     98   


 


3/30/17 20:00 97.9 51 18 131/63 97   


 


3/30/17 16:10  53      


 


3/30/17 16:00 99.7 91 18 103/60 97   


 


3/30/17 14:55  76 20 123/85 96   


 


3/30/17 11:30  82 18 143/63 97 Room Air  








 I/O








 3/30/17 3/30/17 3/30/17 3/31/17 3/31/17 3/31/17





 07:00 15:00 23:00 07:00 15:00 23:00


 


Intake Total   240 ml 240 ml  


 


Output Total  500 ml 300 ml 300 ml  


 


Balance  -500 ml -60 ml -60 ml  


 


      


 


Intake Oral   240 ml 240 ml  


 


Output Urine Total  500 ml 300 ml 300 ml  


 


# Voids  1  1  


 


# Bowel Movements   0 1  








Result Diagram:  


3/30/17 0405                                                                   

             3/31/17 0756





Imaging





Last Impressions








Chest X-Ray 3/30/17 0330 Signed





Impressions: 





 Service Date/Time:  Thursday, March 30, 2017 03:53 - CONCLUSION: No acute 





 disease.       Gurwinder Craven MD 








Objective Remarks


GENERAL: This is a well-nourished, well-developed patient, in no apparent 

distress.


CARDIOVASCULAR: Regular rate and regular rhythm without murmurs, gallops, or 

rubs. 


RESPIRATORY: diminished air entry in bases


GASTROINTESTINAL: Abdomen soft, non-tender, nondistended. 


Normal, active bowel sounds


MUSCULOSKELETAL: Extremities without clubbing, cyanosis, or edema.


NEURO:  Alert & Oriented x4 to person, place, time, situation.  Moves all ext x4


Procedures


none


Medications and IVs





 Current Medications


Sodium Chloride 2 ml 2 ml UNSCH  PRN IVF FLUSH AFTER USING IV ACCESS;  Start 3/

30/17 at 03:30


Sodium Chloride (NS 1000 ml Inj) 1,000 ml @  999 mls/hr BOLUS  ONCE IV  Last 

administered on 3/30/17at 05:42;  Start 3/30/17 at 05:15;  Stop 3/30/17 at 06:15

;  Status DC


Nitroglycerin (Nitrostat Sl) 0.4 mg Q5M  PRN SL CHEST PAIN;  Start 3/30/17 at 06

:15


Aspirin (Aspirin) 325 mg DAILY PO  Last administered on 3/31/17at 08:22;  Start 

3/30/17 at 09:00


Acetaminophen (Tylenol) 500 mg Q4H  PRN PO HEADACHE;  Start 3/30/17 at 08:45


Ondansetron HCl 4 mg 4 mg Q6H  PRN IV NAUSEA;  Start 3/30/17 at 08:45


Sodium Chloride (NS 1000 ml Inj) 1,000 ml @  75 mls/hr [id number] IV  Last 

administered on 3/31/17at 02:20;  Start 3/30/17 at 13:00


Atorvastatin Calcium (Lipitor) 80 mg HS PO  Last administered on 3/31/17at 00:06

;  Start 3/30/17 at 21:00


Dorzolamide/ Timolol (Cosopt 2-0.5% Opth Soln) 1 drop BID EACH EYE  Last 

administered on 3/31/17at 08:28;  Start 3/30/17 at 21:00


Fluticasone Propionate (Flonase Moustapha Spr) 2 spray BID EACH NARE  Last 

administered on 3/31/17at 08:28;  Start 3/30/17 at 21:00


Losartan Potassium (Cozaar) 6.25 mg DAILY PO  Last administered on 3/31/17at 08:

22;  Start 3/31/17 at 09:00


Potassium Chloride (KCl) 20 meq Q12HR PO  Last administered on 3/31/17at 08:22;

  Start 3/30/17 at 21:00


Albuterol/ Ipratropium (Duoneb Neb) 1 ampule Q4HR  NEB NEB  Last administered 

on 3/30/17at 20:26;  Start 3/30/17 at 12:30;  Stop 3/30/17 at 22:00;  Status DC


Albuterol Sulfate (Albuterol Neb) 1.25 mg Q2HR NEB  PRN NEB SHORTNESS OF BREATH

;  Start 3/30/17 at 12:30


Enoxaparin Sodium (Lovenox Inj) 40 mg Q24H SQ ;  Start 3/30/17 at 14:00;  

Status Hold


Miscellaneous (Pill Splitter) 1 ea UNSCH  PRN OTHER SEE LABEL COMMENTS;  Start 3

/30/17 at 13:15


Guaifenesin (Robitussin Liq) 200 mg Q4H  PRN PO COUGH;  Start 3/30/17 at 13:15





A/P


Assessment and Plan


A/P





- chest pain; 


  serial cardiac enzymes negative- 


  stress test tomorrow.


-hyponatremia; continue gentle IV fluid- will monitor the sodium level.


-mild exacerbation of COPD; better- continue spiriva and  neb treatment- will 

monitor


-hypertension;; resumed losartan- will monitor and adjust the regimen as needed


-dyslipidemia; on statin


-DVT prophylaxis with lovenox


Discharge Planning


possible dc home tomorrow-pending the sodium level.


stress test before discharge.








Ele Villasenor MD Mar 31, 2017 09:55

## 2017-04-01 VITALS
SYSTOLIC BLOOD PRESSURE: 115 MMHG | HEART RATE: 66 BPM | DIASTOLIC BLOOD PRESSURE: 67 MMHG | TEMPERATURE: 98 F | OXYGEN SATURATION: 97 % | RESPIRATION RATE: 18 BRPM

## 2017-04-01 VITALS
DIASTOLIC BLOOD PRESSURE: 70 MMHG | SYSTOLIC BLOOD PRESSURE: 105 MMHG | RESPIRATION RATE: 18 BRPM | OXYGEN SATURATION: 96 % | TEMPERATURE: 97.3 F | HEART RATE: 62 BPM

## 2017-04-01 VITALS
OXYGEN SATURATION: 96 % | DIASTOLIC BLOOD PRESSURE: 74 MMHG | SYSTOLIC BLOOD PRESSURE: 103 MMHG | HEART RATE: 71 BPM | TEMPERATURE: 97.6 F | RESPIRATION RATE: 18 BRPM

## 2017-04-01 VITALS
SYSTOLIC BLOOD PRESSURE: 112 MMHG | HEART RATE: 69 BPM | OXYGEN SATURATION: 98 % | RESPIRATION RATE: 18 BRPM | DIASTOLIC BLOOD PRESSURE: 73 MMHG | TEMPERATURE: 97.3 F

## 2017-04-01 VITALS
RESPIRATION RATE: 18 BRPM | HEART RATE: 67 BPM | DIASTOLIC BLOOD PRESSURE: 81 MMHG | OXYGEN SATURATION: 91 % | TEMPERATURE: 97.3 F | SYSTOLIC BLOOD PRESSURE: 143 MMHG

## 2017-04-01 VITALS
SYSTOLIC BLOOD PRESSURE: 99 MMHG | RESPIRATION RATE: 18 BRPM | OXYGEN SATURATION: 97 % | TEMPERATURE: 97.6 F | DIASTOLIC BLOOD PRESSURE: 63 MMHG | HEART RATE: 64 BPM

## 2017-04-01 VITALS — HEART RATE: 64 BPM

## 2017-04-01 VITALS — OXYGEN SATURATION: 99 %

## 2017-04-01 LAB
COLOR UR: (no result)
GLUCOSE UR STRIP-MCNC: (no result) MG/DL
HGB UR QL STRIP: (no result)
KETONES UR STRIP-MCNC: (no result) MG/DL
NITRITE UR QL STRIP: (no result)
SP GR UR STRIP: 1.01 (ref 1–1.03)

## 2017-04-01 RX ADMIN — FLUTICASONE PROPIONATE SCH SPRAY: 50 SPRAY, METERED NASAL at 20:28

## 2017-04-01 RX ADMIN — POTASSIUM CHLORIDE SCH MEQ: 20 TABLET, EXTENDED RELEASE ORAL at 20:24

## 2017-04-01 RX ADMIN — POTASSIUM CHLORIDE SCH MEQ: 20 TABLET, EXTENDED RELEASE ORAL at 09:00

## 2017-04-01 RX ADMIN — FLUTICASONE PROPIONATE SCH SPRAY: 50 SPRAY, METERED NASAL at 09:03

## 2017-04-01 RX ADMIN — ASPIRIN SCH MG: 325 TABLET ORAL at 08:59

## 2017-04-01 RX ADMIN — DORZOLAMIDE HYDROCHLORIDE AND TIMOLOL MALEATE SCH DROP: 20; 5 SOLUTION OPHTHALMIC at 09:04

## 2017-04-01 RX ADMIN — LOSARTAN POTASSIUM SCH MG: 25 TABLET, FILM COATED ORAL at 08:59

## 2017-04-01 RX ADMIN — ATORVASTATIN CALCIUM SCH MG: 80 TABLET, FILM COATED ORAL at 20:24

## 2017-04-01 RX ADMIN — ALBUTEROL SULFATE PRN MG: 1.25 SOLUTION RESPIRATORY (INHALATION) at 09:13

## 2017-04-01 RX ADMIN — DORZOLAMIDE HYDROCHLORIDE AND TIMOLOL MALEATE SCH DROP: 20; 5 SOLUTION OPHTHALMIC at 20:28

## 2017-04-01 NOTE — HHI.PR
Subjective


Remarks


in no acute distress.


no chest pain today.


d/w the RN and no acute issues over night.





Objective


Vitals





 Vital Signs








  Date Time  Temp Pulse Resp B/P Pulse Ox O2 Delivery O2 Flow Rate FiO2


 


4/1/17 08:24 98.0 66 18 115/67 97   


 


4/1/17 04:00 97.3 67 18 143/81 91   


 


4/1/17 00:00 97.6 64 18 99/63 97   


 


3/31/17 20:00 97.2 66 18 100/63 96   


 


3/31/17 19:59  64      


 


3/31/17 16:00 97.5 70 18 101/68 95   


 


3/31/17 12:00 97.3 77 18 114/77 94   








 I/O








 3/31/17 3/31/17 3/31/17 4/1/17 4/1/17 4/1/17





 07:00 15:00 23:00 07:00 15:00 23:00


 


Intake Total 240 ml 480 ml 258 ml 615 ml  


 


Output Total 300 ml 350 ml  325 ml  


 


Balance -60 ml 130 ml 258 ml 290 ml  


 


      


 


Intake Oral 240 ml 480 ml 60 ml 0 ml  


 


IV Total   198 ml 615 ml  


 


Output Urine Total 300 ml 350 ml  325 ml  


 


# Voids 1 2 0 1  


 


# Bowel Movements 1 1 1 0  








Result Diagram:  


3/30/17 0405                                                                   

             3/31/17 0756





Imaging





Last Impressions








Chest X-Ray 3/30/17 0330 Signed





Impressions: 





 Service Date/Time:  Thursday, March 30, 2017 03:53 - CONCLUSION: No acute 





 disease.       Gurwinder Craven MD 








Objective Remarks


GENERAL: This is a well-nourished, well-developed patient, in no apparent 

distress.


CARDIOVASCULAR: Regular rate and regular rhythm without murmurs, gallops, or 

rubs. 


RESPIRATORY: diminished air entry in bases


GASTROINTESTINAL: Abdomen soft, non-tender, nondistended. 


Normal, active bowel sounds


MUSCULOSKELETAL: Extremities without clubbing, cyanosis, or edema.


NEURO:  Alert & Oriented x4 to person, place, time, situation.  Moves all ext x4


Procedures


none


Medications and IVs





 Current Medications


Sodium Chloride 2 ml 2 ml UNSCH  PRN IVF FLUSH AFTER USING IV ACCESS;  Start 3/

30/17 at 03:30


Sodium Chloride (NS 1000 ml Inj) 1,000 ml @  999 mls/hr BOLUS  ONCE IV  Last 

administered on 3/30/17at 05:42;  Start 3/30/17 at 05:15;  Stop 3/30/17 at 06:15

;  Status DC


Nitroglycerin (Nitrostat Sl) 0.4 mg Q5M  PRN SL CHEST PAIN;  Start 3/30/17 at 06

:15


Aspirin (Aspirin) 325 mg DAILY PO  Last administered on 3/31/17at 08:22;  Start 

3/30/17 at 09:00


Acetaminophen (Tylenol) 500 mg Q4H  PRN PO HEADACHE;  Start 3/30/17 at 08:45


Ondansetron HCl 4 mg 4 mg Q6H  PRN IV NAUSEA;  Start 3/30/17 at 08:45


Sodium Chloride (NS 1000 ml Inj) 1,000 ml @  75 mls/hr [id number] IV  Last 

administered on 3/31/17at 21:50;  Start 3/30/17 at 13:00


Atorvastatin Calcium (Lipitor) 80 mg HS PO  Last administered on 3/31/17at 21:50

;  Start 3/30/17 at 21:00


Dorzolamide/ Timolol (Cosopt 2-0.5% Opth Soln) 1 drop BID EACH EYE  Last 

administered on 3/31/17at 21:50;  Start 3/30/17 at 21:00


Fluticasone Propionate (Flonase Moustapha Spr) 2 spray BID EACH NARE  Last 

administered on 3/31/17at 21:50;  Start 3/30/17 at 21:00


Losartan Potassium (Cozaar) 6.25 mg DAILY PO  Last administered on 3/31/17at 08:

22;  Start 3/31/17 at 09:00


Potassium Chloride (KCl) 20 meq Q12HR PO  Last administered on 3/31/17at 21:50;

  Start 3/30/17 at 21:00


Albuterol/ Ipratropium (Duoneb Neb) 1 ampule Q4HR  NEB NEB  Last administered 

on 3/30/17at 20:26;  Start 3/30/17 at 12:30;  Stop 3/30/17 at 22:00;  Status DC


Albuterol Sulfate (Albuterol Neb) 1.25 mg Q2HR NEB  PRN NEB SHORTNESS OF BREATH

;  Start 3/30/17 at 12:30


Enoxaparin Sodium (Lovenox Inj) 40 mg Q24H SQ ;  Start 3/30/17 at 14:00;  

Status Hold


Miscellaneous (Pill Splitter) 1 ea UNSCH  PRN OTHER SEE LABEL COMMENTS;  Start 3

/30/17 at 13:15


Guaifenesin (Robitussin Liq) 200 mg Q4H  PRN PO COUGH;  Start 3/30/17 at 13:15





A/P


Assessment and Plan


A/P





- chest pain; 


  serial cardiac enzymes negative- 


  stress test today.


-hyponatremia; asymptomatic- received gentle IV fluid- f/u as outpatient with 

PCP.


-mild exacerbation of COPD; better- continue spiriva and  neb treatment- will 

monitor


-hypertension;; resumed losartan- will monitor and adjust the regimen as needed


-dyslipidemia; on statin


-DVT prophylaxis with lovenox


Discharge Planning


possible dc home today- pending the stress test and sodium level.


f/u with pcp upon discharge.


see med list.


d/w the patient and RN.








Ele Villasenor MD Apr 1, 2017 09:07

## 2017-04-01 NOTE — RADRPT
EXAM DATE/TIME:  04/01/2017 11:26 

 

[facility] COMPARISON:     

No previous studies available for comparison.

 

 

INDICATIONS :     

Substernal chest pain with generalized weakness. Angina. 

                           

 

DOSE:     

26.7 mCi Tc99m Myoview at stress.

                     8.3 mCi Tc99m Myoview at rest.

                     0.4 mg Lexiscan

                       

 

 

STRESS SYMPTOMS:      

None noted.

                       

 

 

EJECTION FRACTION:       

23%

                       

 

MEDICAL HISTORY :     

Hypercholesterolemia. Hypertension. Chronic obstructive pulmonary disease. 

 

SURGICAL HISTORY :        

None. 

 

ENCOUNTER:     

Initial

 

ACUITY:     

3 days

 

PAIN SCALE:     

5/10

 

LOCATION:      

Substernal chest 

 

TECHNIQUE:     

The patient underwent pharmacologic stress with infusion of prescribed dose.  Continuous ECG tracing 
was monitored during stress.  Gated SPECT imaging was performed after stress and conventional SPECT i
maging was performed at rest.  The examination was performed on a SPECT/CT scanner, both attenuation 
and non-corrected datasets were reviewed.

 

FINDINGS:     

The study is markedly abnormal with an ejection fraction of 23% . There is a large fixed defect invol
ving most of the ventricular apex.  There is no significant redistribution evident.  The best perfuse
d myocardium is the high anterior lateral wall.

 

There is global hypokinesis with depressed ejection fraction of 23%.

 

CONCLUSION:     

Ejection fraction of 23% with little viable myocardium present.  There is no ischemia however exam is
 very insensitive for such in this setting..

 

RISK CATEGORY:     High (>3% Annual Mortality Rate)

 

 

 

 

 Quincy Caldwell MD FACR on April 01, 2017 at 13:38           

Board Certified Radiologist.

 This report was verified electronically.

## 2017-04-01 NOTE — HHI.DCPOC
Discharge Care Plan


Diagnosis:  


(1) Chest pain


Goals to Promote Your Health


* To prevent worsening of your condition and complications


* To maintain your health at the optimal level


Directions to Meet Your Goals


*** Take your medications as prescribed


*** Follow your dietary instruction


*** Follow activity as directed








*** Keep your appointments as scheduled


*** Take your immunizations and boosters as scheduled


*** If your symptoms worsen call your PCP, if no PCP go to Urgent Care Center 

or Emergency Room***


*** Smoking is Dangerous to Your Health. Avoid second hand smoke***


***Call the 24-hour hour crisis hotline for domestic abuse at 1-468.967.7199***








Ele Villasenor MD Apr 1, 2017 09:08

## 2017-04-02 VITALS
OXYGEN SATURATION: 98 % | SYSTOLIC BLOOD PRESSURE: 134 MMHG | DIASTOLIC BLOOD PRESSURE: 86 MMHG | TEMPERATURE: 97.8 F | HEART RATE: 78 BPM | RESPIRATION RATE: 20 BRPM

## 2017-04-02 VITALS — HEART RATE: 76 BPM

## 2017-04-02 VITALS
TEMPERATURE: 97.8 F | OXYGEN SATURATION: 97 % | RESPIRATION RATE: 16 BRPM | DIASTOLIC BLOOD PRESSURE: 62 MMHG | HEART RATE: 72 BPM | SYSTOLIC BLOOD PRESSURE: 102 MMHG

## 2017-04-02 VITALS
RESPIRATION RATE: 20 BRPM | SYSTOLIC BLOOD PRESSURE: 95 MMHG | OXYGEN SATURATION: 95 % | DIASTOLIC BLOOD PRESSURE: 51 MMHG | HEART RATE: 78 BPM | TEMPERATURE: 97.5 F

## 2017-04-02 VITALS
RESPIRATION RATE: 18 BRPM | SYSTOLIC BLOOD PRESSURE: 104 MMHG | TEMPERATURE: 97.6 F | DIASTOLIC BLOOD PRESSURE: 62 MMHG | OXYGEN SATURATION: 97 % | HEART RATE: 58 BPM

## 2017-04-02 VITALS — OXYGEN SATURATION: 95 %

## 2017-04-02 VITALS — HEART RATE: 59 BPM

## 2017-04-02 VITALS
DIASTOLIC BLOOD PRESSURE: 99 MMHG | RESPIRATION RATE: 20 BRPM | SYSTOLIC BLOOD PRESSURE: 136 MMHG | OXYGEN SATURATION: 97 % | TEMPERATURE: 97.7 F | HEART RATE: 62 BPM

## 2017-04-02 VITALS
OXYGEN SATURATION: 99 % | SYSTOLIC BLOOD PRESSURE: 121 MMHG | HEART RATE: 71 BPM | DIASTOLIC BLOOD PRESSURE: 61 MMHG | TEMPERATURE: 98 F | RESPIRATION RATE: 18 BRPM

## 2017-04-02 VITALS — OXYGEN SATURATION: 98 %

## 2017-04-02 RX ADMIN — FLUTICASONE PROPIONATE SCH SPRAY: 50 SPRAY, METERED NASAL at 20:22

## 2017-04-02 RX ADMIN — ASPIRIN SCH MG: 325 TABLET ORAL at 08:49

## 2017-04-02 RX ADMIN — ATORVASTATIN CALCIUM SCH MG: 80 TABLET, FILM COATED ORAL at 20:20

## 2017-04-02 RX ADMIN — DORZOLAMIDE HYDROCHLORIDE AND TIMOLOL MALEATE SCH DROP: 20; 5 SOLUTION OPHTHALMIC at 20:22

## 2017-04-02 RX ADMIN — POTASSIUM CHLORIDE SCH MEQ: 20 TABLET, EXTENDED RELEASE ORAL at 08:49

## 2017-04-02 RX ADMIN — FLUTICASONE PROPIONATE SCH SPRAY: 50 SPRAY, METERED NASAL at 08:55

## 2017-04-02 RX ADMIN — ALBUTEROL SULFATE PRN MG: 1.25 SOLUTION RESPIRATORY (INHALATION) at 20:34

## 2017-04-02 RX ADMIN — LOSARTAN POTASSIUM SCH MG: 25 TABLET, FILM COATED ORAL at 08:50

## 2017-04-02 RX ADMIN — DORZOLAMIDE HYDROCHLORIDE AND TIMOLOL MALEATE SCH DROP: 20; 5 SOLUTION OPHTHALMIC at 08:55

## 2017-04-02 RX ADMIN — POTASSIUM CHLORIDE SCH MEQ: 20 TABLET, EXTENDED RELEASE ORAL at 20:20

## 2017-04-02 NOTE — HHI.PR
Subjective


Remarks


in no acute distress.


denies chest pain or sob.





Objective


Vitals





 Vital Signs








  Date Time  Temp Pulse Resp B/P Pulse Ox O2 Delivery O2 Flow Rate FiO2


 


4/2/17 08:00 97.5 78 20 95/51 95   


 


4/2/17 06:30  59      


 


4/2/17 04:32     95   21


 


4/2/17 04:00 98.0 71 18 121/61 99   


 


4/2/17 00:00 97.6 58 18 104/62 97   


 


4/1/17 20:00 97.6 71 18 103/74 96   


 


4/1/17 18:46  64      


 


4/1/17 16:30 97.3 62 18 105/70 96   


 


4/1/17 13:37 97.3 69 18 112/73 98   








 I/O








 4/1/17 4/1/17 4/1/17 4/2/17 4/2/17 4/2/17





 07:00 15:00 23:00 07:00 15:00 23:00


 


Intake Total 615 ml 360 ml 620 ml   


 


Output Total 325 ml 500 ml 100 ml 600 ml  


 


Balance 290 ml -140 ml 520 ml -600 ml  


 


      


 


Intake Oral 0 ml 360 ml    


 


IV Total 615 ml  620 ml   


 


Output Urine Total 325 ml 500 ml 100 ml 600 ml  


 


# Voids 1     


 


# Bowel Movements 0     








Result Diagram:  


3/30/17 0405                                                                   

             4/2/17 0227





Imaging





Last Impressions








Myocardial Perfusion Scan Nuc Med 4/1/17 0000 Signed





Impressions: 





 Service Date/Time:  Saturday, April 1, 2017 11:26 - CONCLUSION:  Ejection 





 fraction of 23%% with little viable myocardium present.  There is no ischemia 





 however exam is very insensitive for such in this setting..  RISK CATEGORY:   

  





 High (>3%% Annual Mortality Rate)      Quincy Caldwell MD  FACR


 


Chest X-Ray 3/30/17 0330 Signed





Impressions: 





 Service Date/Time:  Thursday, March 30, 2017 03:53 - CONCLUSION: No acute 





 disease.       Gurwinder Craven MD 








Objective Remarks


GENERAL: This is a well-nourished, well-developed patient, in no apparent 

distress.


CARDIOVASCULAR: Regular rate and regular rhythm without murmurs, gallops, or 

rubs. 


RESPIRATORY: diminished air entry in bases


GASTROINTESTINAL: Abdomen soft, non-tender, nondistended. 


Normal, active bowel sounds


MUSCULOSKELETAL: Extremities without clubbing, cyanosis, or edema.


NEURO:  Alert & Oriented x4 to person, place, time, situation.  Moves all ext x4


Procedures


none


Medications and IVs





 Current Medications


Sodium Chloride 2 ml 2 ml UNSCH  PRN IVF FLUSH AFTER USING IV ACCESS;  Start 3/

30/17 at 03:30


Sodium Chloride (NS 1000 ml Inj) 1,000 ml @  999 mls/hr BOLUS  ONCE IV  Last 

administered on 3/30/17at 05:42;  Start 3/30/17 at 05:15;  Stop 3/30/17 at 06:15

;  Status DC


Nitroglycerin (Nitrostat Sl) 0.4 mg Q5M  PRN SL CHEST PAIN;  Start 3/30/17 at 06

:15


Aspirin (Aspirin) 325 mg DAILY PO  Last administered on 4/2/17at 08:49;  Start 3

/30/17 at 09:00


Acetaminophen (Tylenol) 500 mg Q4H  PRN PO HEADACHE;  Start 3/30/17 at 08:45


Ondansetron HCl 4 mg 4 mg Q6H  PRN IV NAUSEA;  Start 3/30/17 at 08:45


Sodium Chloride (NS 1000 ml Inj) 1,000 ml @  100 mls/hr Q10H IV  Last 

administered on 3/31/17at 21:50;  Start 3/30/17 at 13:00;  Status Hold


Atorvastatin Calcium (Lipitor) 80 mg HS PO  Last administered on 4/1/17at 20:24

;  Start 3/30/17 at 21:00


Dorzolamide/ Timolol (Cosopt 2-0.5% Opth Soln) 1 drop BID EACH EYE  Last 

administered on 4/2/17at 08:55;  Start 3/30/17 at 21:00


Fluticasone Propionate (Flonase Moustapha Spr) 2 spray BID EACH NARE  Last 

administered on 4/2/17at 08:55;  Start 3/30/17 at 21:00


Losartan Potassium (Cozaar) 6.25 mg DAILY PO  Last administered on 4/1/17at 08:

59;  Start 3/31/17 at 09:00


Potassium Chloride (KCl) 20 meq Q12HR PO  Last administered on 4/2/17at 08:49;  

Start 3/30/17 at 21:00


Albuterol/ Ipratropium (Duoneb Neb) 1 ampule Q4HR  NEB NEB  Last administered 

on 3/30/17at 20:26;  Start 3/30/17 at 12:30;  Stop 3/30/17 at 22:00;  Status DC


Albuterol Sulfate (Albuterol Neb) 1.25 mg Q2HR NEB  PRN NEB SHORTNESS OF BREATH 

Last administered on 4/1/17at 09:13;  Start 3/30/17 at 12:30


Enoxaparin Sodium (Lovenox Inj) 40 mg Q24H SQ ;  Start 3/30/17 at 14:00;  

Status Hold


Miscellaneous (Pill Splitter) 1 ea UNSCH  PRN OTHER SEE LABEL COMMENTS;  Start 3

/30/17 at 13:15


Guaifenesin (Robitussin Liq) 200 mg Q4H  PRN PO COUGH;  Start 3/30/17 at 13:15


Regadenoson 0.4 mg 0.4 mg STK-MED ONCE .ROUTE  Last administered on 4/1/17at 11:

25;  Start 4/1/17 at 11:25;  Stop 4/1/17 at 11:26;  Status DC


Sodium Chloride (NS 1000 ml Inj) 1,000 ml @  100 mls/hr Q10H IV  Last 

administered on 4/2/17at 05:11;  Start 4/2/17 at 04:45





A/P


Assessment and Plan


A/P





- chest pain/cardiomyopathy; 


  serial cardiac enzymes negative- 


  stress test with EF 23%, no ischemia however insensitive study.


  echo pending- cardiology consulted.


-hyponatremia; asymptomatic- continue with gentle IV hydration and monitor the 

level- check am cortisol level- TSH WNL.


-mild exacerbation of COPD; better- continue spiriva and  neb treatment- will 

monitor


-hypertension;; resumed losartan- will monitor and adjust the regimen as needed


-dyslipidemia; on statin


-DVT prophylaxis with lovenox


Discharge Planning


not ready for discharge yet.








Ele Villasenor MD Apr 2, 2017 10:00

## 2017-04-03 VITALS
RESPIRATION RATE: 16 BRPM | HEART RATE: 60 BPM | DIASTOLIC BLOOD PRESSURE: 59 MMHG | TEMPERATURE: 97.2 F | OXYGEN SATURATION: 95 % | SYSTOLIC BLOOD PRESSURE: 111 MMHG

## 2017-04-03 VITALS
DIASTOLIC BLOOD PRESSURE: 79 MMHG | TEMPERATURE: 97.8 F | HEART RATE: 70 BPM | OXYGEN SATURATION: 95 % | SYSTOLIC BLOOD PRESSURE: 133 MMHG | RESPIRATION RATE: 16 BRPM

## 2017-04-03 VITALS
TEMPERATURE: 97 F | HEART RATE: 67 BPM | DIASTOLIC BLOOD PRESSURE: 55 MMHG | RESPIRATION RATE: 18 BRPM | SYSTOLIC BLOOD PRESSURE: 94 MMHG | OXYGEN SATURATION: 94 %

## 2017-04-03 VITALS — HEART RATE: 75 BPM

## 2017-04-03 VITALS
DIASTOLIC BLOOD PRESSURE: 56 MMHG | SYSTOLIC BLOOD PRESSURE: 90 MMHG | OXYGEN SATURATION: 93 % | TEMPERATURE: 97 F | HEART RATE: 97 BPM | RESPIRATION RATE: 18 BRPM

## 2017-04-03 VITALS
RESPIRATION RATE: 18 BRPM | OXYGEN SATURATION: 97 % | HEART RATE: 73 BPM | DIASTOLIC BLOOD PRESSURE: 72 MMHG | TEMPERATURE: 98.3 F | SYSTOLIC BLOOD PRESSURE: 120 MMHG

## 2017-04-03 VITALS
TEMPERATURE: 97 F | RESPIRATION RATE: 18 BRPM | HEART RATE: 66 BPM | OXYGEN SATURATION: 93 % | DIASTOLIC BLOOD PRESSURE: 61 MMHG | SYSTOLIC BLOOD PRESSURE: 96 MMHG

## 2017-04-03 VITALS — OXYGEN SATURATION: 97 %

## 2017-04-03 VITALS — HEART RATE: 94 BPM

## 2017-04-03 VITALS — OXYGEN SATURATION: 95 %

## 2017-04-03 RX ADMIN — DORZOLAMIDE HYDROCHLORIDE AND TIMOLOL MALEATE SCH DROP: 20; 5 SOLUTION OPHTHALMIC at 20:24

## 2017-04-03 RX ADMIN — LOSARTAN POTASSIUM SCH MG: 25 TABLET, FILM COATED ORAL at 09:32

## 2017-04-03 RX ADMIN — ALBUTEROL SULFATE PRN MG: 1.25 SOLUTION RESPIRATORY (INHALATION) at 22:08

## 2017-04-03 RX ADMIN — DORZOLAMIDE HYDROCHLORIDE AND TIMOLOL MALEATE SCH DROP: 20; 5 SOLUTION OPHTHALMIC at 09:32

## 2017-04-03 RX ADMIN — CARVEDILOL SCH MG: 3.12 TABLET, FILM COATED ORAL at 09:32

## 2017-04-03 RX ADMIN — ASPIRIN SCH MG: 325 TABLET ORAL at 09:32

## 2017-04-03 RX ADMIN — FLUTICASONE PROPIONATE SCH SPRAY: 50 SPRAY, METERED NASAL at 09:32

## 2017-04-03 RX ADMIN — FLUTICASONE PROPIONATE SCH SPRAY: 50 SPRAY, METERED NASAL at 20:24

## 2017-04-03 RX ADMIN — ATORVASTATIN CALCIUM SCH MG: 80 TABLET, FILM COATED ORAL at 20:24

## 2017-04-03 RX ADMIN — POTASSIUM CHLORIDE SCH MEQ: 20 TABLET, EXTENDED RELEASE ORAL at 20:24

## 2017-04-03 RX ADMIN — POTASSIUM CHLORIDE SCH MEQ: 20 TABLET, EXTENDED RELEASE ORAL at 09:32

## 2017-04-03 RX ADMIN — ALBUTEROL SULFATE PRN MG: 1.25 SOLUTION RESPIRATORY (INHALATION) at 03:39

## 2017-04-03 NOTE — HHI.PR
Subjective


Remarks


in no acute distress.


denies chest pain or sob.





Objective


Vitals





 Vital Signs








  Date Time  Temp Pulse Resp B/P Pulse Ox O2 Delivery O2 Flow Rate FiO2


 


4/3/17 08:00 97.0 66 18 96/61 93   


 


4/3/17 04:00 97.8 70 16 133/79 95   


 


4/3/17 03:39     95   21


 


4/3/17 01:39  75      


 


4/3/17 00:00 98.3 73 18 120/72 97   


 


4/2/17 20:34     98   21


 


4/2/17 20:00 97.8 72 16 102/62 97   


 


4/2/17 18:12  76      


 


4/2/17 16:00 97.7 62 20 136/99 97   


 


4/2/17 12:00 97.8 78 20 134/86 98   








 I/O








 4/2/17 4/2/17 4/2/17 4/3/17 4/3/17 4/3/17





 07:00 15:00 23:00 07:00 15:00 23:00


 


Intake Total   2235 ml 240 ml  


 


Output Total 600 ml  350 ml 660 ml  


 


Balance -600 ml  1885 ml -420 ml  


 


      


 


Intake Oral   240 ml 240 ml  


 


IV Total   1995 ml   


 


Output Urine Total 600 ml  350 ml 660 ml  


 


# Voids  2    


 


# Bowel Movements   0 0  








Result Diagram:  


3/30/17 0405                                                                   

             4/3/17 0741





Imaging





Last Impressions








Myocardial Perfusion Scan Nuc Med 4/1/17 0000 Signed





Impressions: 





 Service Date/Time:  Saturday, April 1, 2017 11:26 - CONCLUSION:  Ejection 





 fraction of 23%% with little viable myocardium present.  There is no ischemia 





 however exam is very insensitive for such in this setting..  RISK CATEGORY:   

  





 High (>3%% Annual Mortality Rate)      Quincy Caldwell MD  FACR


 


Chest X-Ray 3/30/17 0330 Signed





Impressions: 





 Service Date/Time:  Thursday, March 30, 2017 03:53 - CONCLUSION: No acute 





 disease.       Gurwinder Craven MD 








Objective Remarks


GENERAL: This is a well-nourished, well-developed patient, in no apparent 

distress.


CARDIOVASCULAR: Regular rate and regular rhythm without murmurs, gallops, or 

rubs. 


RESPIRATORY: diminished air entry in bases


GASTROINTESTINAL: Abdomen soft, non-tender, nondistended. 


Normal, active bowel sounds


MUSCULOSKELETAL: Extremities without clubbing, cyanosis, or edema.


NEURO:  Alert & Oriented x4 to person, place, time, situation.  Moves all ext x4


Procedures


none


Medications and IVs





 Current Medications


Sodium Chloride 2 ml 2 ml UNSCH  PRN IVF FLUSH AFTER USING IV ACCESS;  Start 3/

30/17 at 03:30


Sodium Chloride (NS 1000 ml Inj) 1,000 ml @  999 mls/hr BOLUS  ONCE IV  Last 

administered on 3/30/17at 05:42;  Start 3/30/17 at 05:15;  Stop 3/30/17 at 06:15

;  Status DC


Nitroglycerin (Nitrostat Sl) 0.4 mg Q5M  PRN SL CHEST PAIN;  Start 3/30/17 at 06

:15


Aspirin (Aspirin) 325 mg DAILY PO  Last administered on 4/2/17at 08:49;  Start 3

/30/17 at 09:00


Acetaminophen (Tylenol) 500 mg Q4H  PRN PO HEADACHE;  Start 3/30/17 at 08:45


Ondansetron HCl 4 mg 4 mg Q6H  PRN IV NAUSEA;  Start 3/30/17 at 08:45


Sodium Chloride (NS 1000 ml Inj) 1,000 ml @  75 mls/hr [id number] IV  Last 

administered on 3/31/17at 21:50;  Start 3/30/17 at 13:00;  Stop 4/2/17 at 15:36

;  Status DC


Atorvastatin Calcium (Lipitor) 80 mg HS PO  Last administered on 4/2/17at 20:20

;  Start 3/30/17 at 21:00


Dorzolamide/ Timolol (Cosopt 2-0.5% Opt Soln) 1 drop BID EACH EYE  Last 

administered on 4/2/17at 20:22;  Start 3/30/17 at 21:00


Fluticasone Propionate (Flonase Moustapha Spr) 2 spray BID EACH NARE  Last 

administered on 4/2/17at 20:22;  Start 3/30/17 at 21:00


Losartan Potassium (Cozaar) 6.25 mg DAILY PO  Last administered on 4/1/17at 08:

59;  Start 3/31/17 at 09:00;  Stop 4/2/17 at 16:05;  Status DC


Potassium Chloride (KCl) 20 meq Q12HR PO  Last administered on 4/2/17at 20:20;  

Start 3/30/17 at 21:00


Albuterol/ Ipratropium (Duoneb Neb) 1 ampule Q4HR  NEB NEB  Last administered 

on 3/30/17at 20:26;  Start 3/30/17 at 12:30;  Stop 3/30/17 at 22:00;  Status DC


Albuterol Sulfate (Albuterol Neb) 1.25 mg Q2HR NEB  PRN NEB SHORTNESS OF BREATH 

Last administered on 4/3/17at 03:39;  Start 3/30/17 at 12:30


Enoxaparin Sodium (Lovenox Inj) 40 mg Q24H SQ ;  Start 3/30/17 at 14:00;  

Status Hold


Miscellaneous (Pill Splitter) 1 ea UNSCH  PRN OTHER SEE LABEL COMMENTS;  Start 3

/30/17 at 13:15


Guaifenesin (Robitussin Liq) 200 mg Q4H  PRN PO COUGH;  Start 3/30/17 at 13:15


Regadenoson 0.4 mg 0.4 mg STK-MED ONCE .ROUTE  Last administered on 4/1/17at 11:

25;  Start 4/1/17 at 11:25;  Stop 4/1/17 at 11:26;  Status DC


Sodium Chloride 1,000 ml @  100 mls/hr Q10H IV  Last administered on 4/2/17at 05

:11;  Start 4/2/17 at 04:45;  Stop 4/2/17 at 15:36;  Status DC


Sodium Chloride (Sodium Chloride 3% Inj) 500 ml @  35 mls/hr ONCE IV  Last 

administered on 4/2/17at 16:56;  Start 4/2/17 at 17:00;  Stop 4/2/17 at 23:00;  

Status DC


Losartan Potassium (Cozaar) 12.5 mg DAILY PO ;  Start 4/3/17 at 09:00


Carvedilol (Coreg) 3.125 mg DAILY PO ;  Start 4/3/17 at 09:00





A/P


Assessment and Plan


A/P





- chest pain/cardiomyopathy; 


  serial cardiac enzymes negative- 


  stress test with EF 23%, no ischemia however insensitive study.


  echo pending- cardiology consult appreciated.


  continue aspirin, Cozaar and Coreg.


-hyponatremia; asymptomatic- continue with gentle IV fluid- monitor the sodium 

level- TSH WNL. cortisol level pending.


- COPD; - continue  neb treatment- 


-hypertension;continue Cozzar and Coreg- will monitor and adjust the regimen as 

needed


-dyslipidemia; on statin


-DVT prophylaxis with SCD's


Discharge Planning


not ready for discharge yet.








Ele Villasenor MD Apr 3, 2017 09:19

## 2017-04-03 NOTE — PD.CARD.PN
Subjective


Subjective Remarks


C/O cough with pleuritic discomfort.





Objective


Medications





 Current Medications








 Medications


  (Trade)  Dose


 Ordered  Sig/Kiaa


 Route  Start Time


 Stop Time Status Last Admin


 


  (NS Flush)  2 ml  UNSCH  PRN


 IVF  3/30/17 03:30


     


 


 


  (Nitrostat Sl)  0.4 mg  Q5M  PRN


 SL  3/30/17 06:15


     


 


 


  (Aspirin)  325 mg  DAILY


 PO  3/30/17 09:00


    4/2/17 08:49


 


 


  (Tylenol)  500 mg  Q4H  PRN


 PO  3/30/17 08:45


     


 


 


  (Zofran Inj)  4 mg  Q6H  PRN


 IV  3/30/17 08:45


     


 


 


  (Lipitor)  80 mg  HS


 PO  3/30/17 21:00


    4/2/17 20:20


 


 


  (Cosopt 2-0.5%


 Opth Soln)  1 drop  BID


 EACH EYE  3/30/17 21:00


    4/2/17 20:22


 


 


  (Flonase Moustapha Spr)  2 spray  BID


 EACH NARE  3/30/17 21:00


    4/2/17 20:22


 


 


  (KCl)  20 meq  Q12HR


 PO  3/30/17 21:00


    4/2/17 20:20


 


 


  (Lovenox Inj)  40 mg  Q24H


 SQ  3/30/17 14:00


   Hold  


 


 


  (Pill Splitter)  1 ea  UNSCH  PRN


 OTHER  3/30/17 13:15


     


 


 


  (Robitussin Liq)  200 mg  Q4H  PRN


 PO  3/30/17 13:15


     


 


 


  (Cozaar)  12.5 mg  DAILY


 PO  4/3/17 09:00


     


 


 


  (Coreg)  3.125 mg  DAILY


 PO  4/3/17 09:00


     


 








Vital Signs / I&O





 Vital Signs








  Date Time  Temp Pulse Resp B/P Pulse Ox O2 Delivery O2 Flow Rate FiO2


 


4/3/17 04:00 97.8 70 16 133/79 95   


 


4/3/17 03:39     95   21


 


4/3/17 01:39  75      


 


4/3/17 00:00 98.3 73 18 120/72 97   


 


4/2/17 20:34     98   21


 


4/2/17 20:00 97.8 72 16 102/62 97   


 


4/2/17 18:12  76      


 


4/2/17 16:00 97.7 62 20 136/99 97   


 


4/2/17 12:00 97.8 78 20 134/86 98   


 


4/2/17 08:00 97.5 78 20 95/51 95   








 I/O








 4/2/17 4/2/17 4/2/17 4/3/17 4/3/17 4/3/17





 07:00 15:00 23:00 07:00 15:00 23:00


 


Intake Total   2235 ml 240 ml  


 


Output Total 600 ml  350 ml 660 ml  


 


Balance -600 ml  1885 ml -420 ml  


 


      


 


Intake Oral   240 ml 240 ml  


 


IV Total   1995 ml   


 


Output Urine Total 600 ml  350 ml 660 ml  


 


# Voids  2    


 


# Bowel Movements   0 0  








Physical Exam


GENERAL: Thin, well-developed patient.


SKIN: Warm and dry.


HEAD: Normocephalic.


EYES: No scleral icterus. No injection or drainage. 


NECK: Supple, trachea midline. No JVD or lymphadenopathy.


CARDIOVASCULAR: Regular rate and rhythm without murmurs, gallops, or rubs. 


RESPIRATORY: Breath sounds equal bilaterally, scattered wheeze. No accessory 

muscle use.


GASTROINTESTINAL: Abdomen soft, non-tender, nondistended. 


EXTREMITIES: No cyanosis, or edema. 


NEUROLOGICAL: Awake, alert, and oriented x 3. Non-focal.


Laboratory





Laboratory Tests








Test 4/2/17 4/2/17 4/2/17 4/3/17





 11:06 15:00 18:48 00:31


 


Sodium Level 123 MEQ/L  126 MEQ/L 128 MEQ/L


 


Urine Random Sodium  78 MEQ/L  


 


Urine Osmolality  216 MOSM/KG  


 


Serum Osmolality   257 MOSM/KG 








Imaging





Last Impressions








Myocardial Perfusion Scan Nuc Med 4/1/17 0000 Signed





Impressions: 





 Service Date/Time:  Saturday, April 1, 2017 11:26 - CONCLUSION:  Ejection 





 fraction of 23%% with little viable myocardium present.  There is no ischemia 





 however exam is very insensitive for such in this setting..  RISK CATEGORY:   

  





 High (>3%% Annual Mortality Rate)      Quincy Caldwell MD  FACR


 


Chest X-Ray 3/30/17 0330 Signed





Impressions: 





 Service Date/Time:  Thursday, March 30, 2017 03:53 - CONCLUSION: No acute 





 disease.       Gurwinder Craven MD 











Assessment and Plan


Problem List:  


(1) NSTEMI (non-ST elevated myocardial infarction)


Assessment and Plan:  States CP only with cough, EF 23% by nuclear, EKG showing 

anteroseptal changes, troponin negative. No ischemia noted on nuclear, but poor 

specificity noted. Losartan and carvedilol initiated for medication 

optimization.





(2) COPD exacerbation


Assessment and Plan:  On abx per primary.  Feeling better, but still coughing.





Assessment and Plan


Assessment and plan d/w pt., RN, Dr. Bal.








Charisma Mendez Apr 3, 2017 07:08

## 2017-04-03 NOTE — MB
cc:

[place] M.D.

****

 

 

DATE OF CONSULTATION:  04/02/2017

 

HISTORY OF PRESENT ILLNESS

Mr. Hamilton is a 72-year-old -American gentleman with history of

congestive heart failure, cardiomyopathy, coronary artery disease, previous

myocardial infarction, in January 2006,

PTCA plus stent to LAD by Dr. Mark that January. The ejection fraction

was around 40%. He was admitted due to

chest pain.  Nuclear stress study indicated no significant ischemia, ejection

fraction around 20%.  I was consulted for further evaluation and management.

The chart was reviewed, the patient was evaluated.

 

ALLERGIES

IV contrast.

 

SOCIAL HISTORY

The patient denies smoking and drinking.

 

FAMILY HISTORY

Noncontributory to his current medical condition.

 

MEDICATIONS

Currently he is on:

1.   Acetaminophen.

2. Aspirin.

3. Lipitor 80 mg a day.

4. Lovenox subcu.

5. Losartan 6.25 mg a day.

 

REVIEW OF SYSTEMS

Currently the patient refers no chest pain or chest discomfort. He is asking

for his daughter.  He wants to go home.  No vomiting.  No fever.

 

PHYSICAL EXAMINATION

GENERAL: Alert, fully oriented.

VITAL SIGNS: Blood pressure currently 134/86, pulse 78, respiratory rate 20.

LUNGS:  Ventilated.

CARDIOVASCULAR: S1-S2, regular.  No gallop.

ABDOMEN: Soft.  No mass.  No bruit.

EXTREMITIES: No edema.

 

ELECTROCARDIOGRAM

Sinus rhythm, first-degree AV block, intraventricular conduction delay, some

septal ST changes.

 

LABORATORY DATA

Hemoglobin 12.3, white blood cell 5.5, potassium is 4.4, creatinine 0.77.

Sodium is 123.

 

ASSESSMENT AND RECOMMENDATION

Mr. Hamilton is currently stable.  He has some shortness of breath.  His EF 16

months ago was 40%, currently it is around 23%.  Blood pressure is very low.

This is why he is only on very low dose of Losartan. I am going to try to add

the Coreg at 3.125 mg daily.  Also his sodium is very low.  There is no sign of

hyperdelusion.  This is a sign of advanced heart failure and poor prognosis.

At this point my recommendation is medical management.  Nuclear stress study

indicated no ischemia, no need for revascularization.  Reevaluate the echo

again in the next 60 days.  If at that point ejection fraction is still low,

then a  defibrillator will be considered.  The condition is of care.  His

prognosis is guarded.

 

                              _________________________________

                              MD STEPAN Wallace

D:  4/2/2017/3:42 PM

T:  4/3/2017/8:21 AM

Visit #:  [id number]

Job #:  [id number]

MARCO A

## 2017-04-03 NOTE — EC
Study

 

Study Date:04/03/2017

 

 

 

STUDY CONCLUSIONS

 

SUMMARY

 

- Left ventricle: The cavity size was normal. Wall thickness was

normal. Systolic function was moderately reduced. The estimated

ejection fraction was in the range of 35% to 40%. Diffuse

hypokinesis worse at the apex.

- Tricuspid valve: Mild-moderate regurgitation.

 

-------------------------------------------------------------------

If LV function is below 40, please consider prescribing an ACEI or

ARB or document rationale for non-use.

 

-------------------------------------------------------------------

PROCEDURE DATA

 

STUDY STATUS:

Elective. Procedure: Transthoracic echocardiography.

Image quality was good. Scanning was performed from the

parasternal, apical, and subcostal acoustic windows. Study

completion: The patient tolerated the procedure well.

Transthoracic echocardiography. M-mode, complete 2D, complete

spectral Doppler, and color Doppler. Patient status: Inpatient.

 

-------------------------------------------------------------------

CARDIAC ANATOMY

 

LEFT VENTRICLE:

The cavity size was normal. Wall thickness was

normal. Systolic function was moderately reduced. The estimated

ejection fraction was in the range of 35% to 40%. Diffuse

hypokinesis worse at the apex.

 

AORTIC VALVE:

Trileaflet; normal thickness leaflets. Doppler:

Transvalvular velocity was within the normal range. There was no

stenosis. No regurgitation.

 

AORTA:

Aortic root: The aortic root was normal in size.

 

MITRAL VALVE:

Structurally normal valve. Doppler: Transvalvular

velocity was within the normal range. There was no evidence for

stenosis. Trace regurgitation.

 

LEFT ATRIUM:

The atrium was normal in size.

 

RIGHT VENTRICLE:

The cavity size was normal. Wall thickness was

normal.

 

PULMONIC VALVE:

Doppler: Transvalvular velocity was within the

normal range. There was no evidence for stenosis. No regurgitation.

 

TRICUSPID VALVE:

Structurally normal valve. Doppler: Transvalvular

velocity was within the normal range. Mild-moderate regurgitation.

 

PULMONARY ARTERY:

The main pulmonary artery was normal-sized.

Systolic pressure was within the normal range.

 

RIGHT ATRIUM:

The atrium was normal in size.

 

PERICARDIUM:

There was no pericardial effusion.

 

SYSTEMIC VEINS:

Inferior vena cava: The vessel was normal in size.

Prepared and signed by

 

Dangelo Mccarty

[id number]

## 2017-04-04 VITALS
RESPIRATION RATE: 16 BRPM | SYSTOLIC BLOOD PRESSURE: 119 MMHG | HEART RATE: 60 BPM | DIASTOLIC BLOOD PRESSURE: 66 MMHG | TEMPERATURE: 97.4 F | OXYGEN SATURATION: 95 %

## 2017-04-04 VITALS
HEART RATE: 60 BPM | SYSTOLIC BLOOD PRESSURE: 108 MMHG | TEMPERATURE: 97.4 F | OXYGEN SATURATION: 97 % | DIASTOLIC BLOOD PRESSURE: 78 MMHG | RESPIRATION RATE: 20 BRPM

## 2017-04-04 VITALS
DIASTOLIC BLOOD PRESSURE: 60 MMHG | OXYGEN SATURATION: 98 % | TEMPERATURE: 98.2 F | RESPIRATION RATE: 18 BRPM | HEART RATE: 61 BPM | SYSTOLIC BLOOD PRESSURE: 92 MMHG

## 2017-04-04 VITALS
OXYGEN SATURATION: 97 % | DIASTOLIC BLOOD PRESSURE: 73 MMHG | HEART RATE: 50 BPM | TEMPERATURE: 97 F | SYSTOLIC BLOOD PRESSURE: 120 MMHG | RESPIRATION RATE: 18 BRPM

## 2017-04-04 VITALS
DIASTOLIC BLOOD PRESSURE: 68 MMHG | TEMPERATURE: 97.5 F | OXYGEN SATURATION: 98 % | RESPIRATION RATE: 20 BRPM | HEART RATE: 64 BPM | SYSTOLIC BLOOD PRESSURE: 111 MMHG

## 2017-04-04 VITALS
SYSTOLIC BLOOD PRESSURE: 117 MMHG | DIASTOLIC BLOOD PRESSURE: 64 MMHG | HEART RATE: 70 BPM | RESPIRATION RATE: 18 BRPM | OXYGEN SATURATION: 70 % | TEMPERATURE: 98 F

## 2017-04-04 VITALS — HEART RATE: 63 BPM

## 2017-04-04 VITALS — HEART RATE: 58 BPM

## 2017-04-04 RX ADMIN — DORZOLAMIDE HYDROCHLORIDE AND TIMOLOL MALEATE SCH DROP: 20; 5 SOLUTION OPHTHALMIC at 08:46

## 2017-04-04 RX ADMIN — DORZOLAMIDE HYDROCHLORIDE AND TIMOLOL MALEATE SCH DROP: 20; 5 SOLUTION OPHTHALMIC at 20:39

## 2017-04-04 RX ADMIN — PHENYTOIN SODIUM SCH MLS/HR: 50 INJECTION INTRAMUSCULAR; INTRAVENOUS at 16:45

## 2017-04-04 RX ADMIN — FLUTICASONE PROPIONATE SCH SPRAY: 50 SPRAY, METERED NASAL at 08:46

## 2017-04-04 RX ADMIN — SODIUM CHLORIDE TAB 1 GM SCH GM: 1 TAB at 10:31

## 2017-04-04 RX ADMIN — ASPIRIN SCH MG: 325 TABLET ORAL at 08:45

## 2017-04-04 RX ADMIN — ATORVASTATIN CALCIUM SCH MG: 80 TABLET, FILM COATED ORAL at 20:38

## 2017-04-04 RX ADMIN — LOSARTAN POTASSIUM SCH MG: 25 TABLET, FILM COATED ORAL at 08:45

## 2017-04-04 RX ADMIN — SODIUM CHLORIDE TAB 1 GM SCH GM: 1 TAB at 18:00

## 2017-04-04 RX ADMIN — SODIUM CHLORIDE TAB 1 GM SCH GM: 1 TAB at 12:22

## 2017-04-04 RX ADMIN — POTASSIUM CHLORIDE SCH MEQ: 20 TABLET, EXTENDED RELEASE ORAL at 20:38

## 2017-04-04 RX ADMIN — POTASSIUM CHLORIDE SCH MEQ: 20 TABLET, EXTENDED RELEASE ORAL at 08:45

## 2017-04-04 RX ADMIN — CARVEDILOL SCH MG: 3.12 TABLET, FILM COATED ORAL at 08:46

## 2017-04-04 RX ADMIN — PHENYTOIN SODIUM SCH MLS/HR: 50 INJECTION INTRAMUSCULAR; INTRAVENOUS at 08:45

## 2017-04-04 RX ADMIN — FLUTICASONE PROPIONATE SCH SPRAY: 50 SPRAY, METERED NASAL at 20:39

## 2017-04-04 NOTE — HHI.PR
Subjective


Remarks


Follow-up for chest pain, cardiomyopathy, COPD, hyponatremia.  Patient denies 

any chest pain, shortness of breath, fever or chills.  He declined to do repeat 

sodium check this afternoon.





Objective


Vitals





 Vital Signs








  Date Time  Temp Pulse Resp B/P Pulse Ox O2 Delivery O2 Flow Rate FiO2


 


4/4/17 12:00 98.2 61 18 92/60 98   


 


4/4/17 08:00 97.4 60 20 108/78 97   


 


4/4/17 07:36  63      


 


4/4/17 04:00 98.0 70 18 117/64 70   


 


4/4/17 00:00 97.4 60 16 119/66 95   


 


4/3/17 22:09     97   


 


4/3/17 20:00  60      


 


4/3/17 20:00 97.2 68 16 111/59 95   








 I/O








 4/3/17 4/3/17 4/3/17 4/4/17 4/4/17 4/4/17





 07:00 15:00 23:00 07:00 15:00 23:00


 


Intake Total 240 ml 240 ml 358 ml 240 ml  


 


Output Total 660 ml 500 ml 300 ml 250 ml  


 


Balance -420 ml -260 ml 58 ml -10 ml  


 


      


 


Intake Oral 240 ml 240 ml 150 ml 240 ml  


 


IV Total   208 ml   


 


Output Urine Total 660 ml 500 ml 300 ml 250 ml  


 


# Bowel Movements 0 0 1 1  








Result Diagram:  


4/4/17 0612





Imaging





Last Impressions








Myocardial Perfusion Scan Nuc Med 4/1/17 0000 Signed





Impressions: 





 Service Date/Time:  Saturday, April 1, 2017 11:26 - CONCLUSION:  Ejection 





 fraction of 23%% with little viable myocardium present.  There is no ischemia 





 however exam is very insensitive for such in this setting..  RISK CATEGORY:   

  





 High (>3%% Annual Mortality Rate)      Quincy Caldwell MD  FACR


 


Chest X-Ray 3/30/17 0330 Signed





Impressions: 





 Service Date/Time:  Thursday, March 30, 2017 03:53 - CONCLUSION: No acute 





 disease.       Gurwinder Craven MD 








Objective Remarks


GENERAL: Alert, NAD. 


SKIN: Warm and dry.


HEAD: Normocephalic.


EYES: No scleral icterus. No injection or drainage. 


NECK: Supple, trachea midline. No JVD or lymphadenopathy.


CARDIOVASCULAR: Regular rate and rhythm without murmurs, gallops, or rubs. 


RESPIRATORY: Breath sounds equal bilaterally. No accessory muscle use.


GASTROINTESTINAL: Abdomen soft, non-tender, nondistended. 


MUSCULOSKELETAL: No cyanosis, or edema. 


BACK: Nontender without obvious deformity. No CVA tenderness.





Procedures


none





A/P


Assessment and Plan


Mr. Hamilton is a 72-year-old  [place] male with a history of hyperlipidemia

, hypertension, diabetes who presented to the emergency department on 3/30/2017 

due to generalized weakness that started 2 days prior to this admission.  

Troponin 3 were negative.  Patient underwent a nuclear stress test which did 

not show any ischemia but ejection fraction was calculated as 23%.  However 

echocardiogram shows ejection fraction 35-40%.  Patient also had hyponatremia 

initial sodium 127.  Sodium went down to 124.





- Chest pain


- Nonischemic cardiomyopathy


   - Continue aspirin 325 mg by mouth daily, carvedilol 3.125 mg daily, 

losartan 0.5 mg every daily. 


   - Continue atorvastatin 80 mg by mouth daily at bedtime.





- Hyponatremia - sodium 124.  Urine osmolality 216, urine sodium 78, serum 

osmolarity 257.


   - We'll continue normal saline at 75 cc per hour.


   - Patient did not allow us to do repeat sodium this afternoon. 


   - Continue sodium tablet.  Will recheck BMP in the morning.





- COPD - continue breathing treatments








Full code. Lovenox.








Milton Zapata DO Apr 4, 2017 17:25

## 2017-04-05 VITALS
TEMPERATURE: 97.9 F | HEART RATE: 62 BPM | DIASTOLIC BLOOD PRESSURE: 66 MMHG | RESPIRATION RATE: 18 BRPM | OXYGEN SATURATION: 98 % | SYSTOLIC BLOOD PRESSURE: 116 MMHG

## 2017-04-05 VITALS
TEMPERATURE: 97.8 F | DIASTOLIC BLOOD PRESSURE: 64 MMHG | HEART RATE: 55 BPM | SYSTOLIC BLOOD PRESSURE: 108 MMHG | OXYGEN SATURATION: 100 % | RESPIRATION RATE: 18 BRPM

## 2017-04-05 VITALS
OXYGEN SATURATION: 98 % | TEMPERATURE: 97.3 F | RESPIRATION RATE: 18 BRPM | DIASTOLIC BLOOD PRESSURE: 66 MMHG | HEART RATE: 53 BPM | SYSTOLIC BLOOD PRESSURE: 109 MMHG

## 2017-04-05 VITALS
RESPIRATION RATE: 18 BRPM | OXYGEN SATURATION: 96 % | TEMPERATURE: 97.8 F | HEART RATE: 65 BPM | SYSTOLIC BLOOD PRESSURE: 137 MMHG | DIASTOLIC BLOOD PRESSURE: 65 MMHG

## 2017-04-05 VITALS
DIASTOLIC BLOOD PRESSURE: 57 MMHG | SYSTOLIC BLOOD PRESSURE: 96 MMHG | OXYGEN SATURATION: 95 % | TEMPERATURE: 97.6 F | RESPIRATION RATE: 20 BRPM | HEART RATE: 58 BPM

## 2017-04-05 VITALS
HEART RATE: 54 BPM | RESPIRATION RATE: 20 BRPM | DIASTOLIC BLOOD PRESSURE: 70 MMHG | SYSTOLIC BLOOD PRESSURE: 113 MMHG | TEMPERATURE: 97.3 F | OXYGEN SATURATION: 98 %

## 2017-04-05 VITALS — HEART RATE: 74 BPM

## 2017-04-05 LAB
ANION GAP SERPL CALC-SCNC: 7 MEQ/L (ref 5–15)
BUN SERPL-MCNC: 4 MG/DL (ref 7–18)
CHLORIDE SERPL-SCNC: 94 MEQ/L (ref 98–107)
GFR SERPLBLD BASED ON 1.73 SQ M-ARVRAT: 139 ML/MIN (ref 89–?)
HCO3 BLD-SCNC: 23.7 MEQ/L (ref 21–32)
POTASSIUM SERPL-SCNC: 4.5 MEQ/L (ref 3.5–5.1)
SODIUM SERPL-SCNC: 125 MEQ/L (ref 136–145)

## 2017-04-05 RX ADMIN — SODIUM CHLORIDE TAB 1 GM SCH GM: 1 TAB at 13:00

## 2017-04-05 RX ADMIN — DORZOLAMIDE HYDROCHLORIDE AND TIMOLOL MALEATE SCH DROP: 20; 5 SOLUTION OPHTHALMIC at 09:54

## 2017-04-05 RX ADMIN — FLUTICASONE PROPIONATE SCH SPRAY: 50 SPRAY, METERED NASAL at 09:54

## 2017-04-05 RX ADMIN — SODIUM CHLORIDE TAB 1 GM SCH GM: 1 TAB at 18:00

## 2017-04-05 RX ADMIN — ATORVASTATIN CALCIUM SCH MG: 80 TABLET, FILM COATED ORAL at 22:18

## 2017-04-05 RX ADMIN — SODIUM CHLORIDE TAB 1 GM SCH GM: 1 TAB at 09:46

## 2017-04-05 RX ADMIN — ASPIRIN SCH MG: 325 TABLET ORAL at 09:46

## 2017-04-05 RX ADMIN — ATORVASTATIN CALCIUM SCH MG: 80 TABLET, FILM COATED ORAL at 22:14

## 2017-04-05 RX ADMIN — FLUTICASONE PROPIONATE SCH SPRAY: 50 SPRAY, METERED NASAL at 22:14

## 2017-04-05 RX ADMIN — DORZOLAMIDE HYDROCHLORIDE AND TIMOLOL MALEATE SCH DROP: 20; 5 SOLUTION OPHTHALMIC at 22:14

## 2017-04-05 RX ADMIN — CARVEDILOL SCH MG: 3.12 TABLET, FILM COATED ORAL at 09:46

## 2017-04-05 RX ADMIN — LOSARTAN POTASSIUM SCH MG: 25 TABLET, FILM COATED ORAL at 09:46

## 2017-04-05 RX ADMIN — FLUDROCORTISONE ACETATE SCH MG: 0.1 TABLET ORAL at 10:30

## 2017-04-05 RX ADMIN — POTASSIUM CHLORIDE SCH MEQ: 20 TABLET, EXTENDED RELEASE ORAL at 09:46

## 2017-04-05 NOTE — HHI.FF
Face to Face Verification


Diagnosis:  


(1) COPD exacerbation


(2) Weakness


Physical Therapy


Order:  Evaluate and Treat, Improve ambulation, Strength and gait training





Home Health Nursing


Order:     Signs/symptoms of disease process


      Nursing assessment with vital signs








I have seen patient Shahram Hamilton on 4/5/17. My clinical findings support the 

need for the requested home health care services because:


Deconditioned w/ increased weakness


Limited ability to care for self


Need for psychosocial assistance


Impaired cognition/judgement


High risk of falls








I certify that my clinical findings support that this patient is homebound 

because:


Impaired cognitive ability/safety


Unsteady gait/balance


Unsafe to leave home unassisted


Need for psychosocial assistance


Unable to use public transportation








Milton Zapata DO Apr 5, 2017 14:01

## 2017-04-05 NOTE — HHI.PR
Subjective


Remarks


Follow up for hyponatremia. Mr. Hamilton is doing well. Denies any chest pain, 

shortness of breath, fever, chills.





Objective


Vitals





 Vital Signs








  Date Time  Temp Pulse Resp B/P Pulse Ox O2 Delivery O2 Flow Rate FiO2


 


4/5/17 12:02 97.3 53 18 109/66 98   


 


4/5/17 08:02 97.8 65 18 137/65 96   


 


4/5/17 08:00  75      


 


4/5/17 08:00  74      


 


4/5/17 04:00 97.3 54 20 113/70 98   


 


4/5/17 00:00 97.8 55 18 108/64 100   


 


4/4/17 20:15  58      


 


4/4/17 20:00 97.5 64 20 111/68 98   








 I/O








 4/4/17 4/4/17 4/4/17 4/5/17 4/5/17 4/5/17





 07:00 15:00 23:00 07:00 15:00 23:00


 


Intake Total 240 ml 480 ml 183 ml 631 ml  


 


Output Total 250 ml 500 ml    


 


Balance -10 ml -20 ml 183 ml 631 ml  


 


      


 


Intake Oral 240 ml 480 ml    


 


IV Total   183 ml 631 ml  


 


Output Urine Total 250 ml 500 ml    


 


# Voids    3  


 


# Bowel Movements 1 0  3  








Result Diagram:  


4/5/17 0742





Imaging





Last Impressions








Myocardial Perfusion Scan Nuc Med 4/1/17 0000 Signed





Impressions: 





 Service Date/Time:  Saturday, April 1, 2017 11:26 - CONCLUSION:  Ejection 





 fraction of 23%% with little viable myocardium present.  There is no ischemia 





 however exam is very insensitive for such in this setting..  RISK CATEGORY:   

  





 High (>3%% Annual Mortality Rate)      Quincy Caldwell MD  FACR


 


Chest X-Ray 3/30/17 0330 Signed





Impressions: 





 Service Date/Time:  Thursday, March 30, 2017 03:53 - CONCLUSION: No acute 





 disease.       Gurwinder Craven MD 








Objective Remarks


GENERAL: Alert, NAD. 


SKIN: Warm and dry.


HEAD: Normocephalic.


EYES: No scleral icterus. No injection or drainage. 


NECK: Supple, trachea midline. No JVD or lymphadenopathy.


CARDIOVASCULAR: Regular rate and rhythm without murmurs, gallops, or rubs. 


RESPIRATORY: Breath sounds equal bilaterally. No accessory muscle use.


GASTROINTESTINAL: Abdomen soft, non-tender, nondistended. 


MUSCULOSKELETAL: No cyanosis, or edema. 


BACK: Nontender without obvious deformity. No CVA tenderness.





Procedures


none





A/P


Assessment and Plan


Mr. Hamilton is a 72-year-old  [place] male with a history of hyperlipidemia

, hypertension, diabetes who presented to the emergency department on 3/30/2017 

due to generalized weakness that started 2 days prior to this admission.  

Troponin 3 were negative.  Patient underwent a nuclear stress test which did 

not show any ischemia but ejection fraction was calculated as 23%.  However 

echocardiogram shows ejection fraction 35-40%.  Patient also had hyponatremia 

initial sodium 127.  Sodium went down to 124.





- Chest pain


- Nonischemic cardiomyopathy


   - Continue aspirin 325 mg by mouth daily, carvedilol 3.125 mg daily, 

losartan 0.5 mg every daily. 


   - Continue atorvastatin 80 mg by mouth daily at bedtime.





- Hyponatremia - sodium 124 --> 125.  Urine osmolality 216, urine sodium 78, 

serum osmolarity 257.


   - We'll continue normal saline at 100 cc per hour.


   - Started Fludrocortisone 0.1mg Qday and continue salt tablets. 


   - Will check serum Sodium in the AM. 


   - D/C KCL 





- COPD - continue breathing treatments





Full code. Lovefazalx.








Milton Zapata DO Apr 5, 2017 5:40 pm

## 2017-04-05 NOTE — PQ
Physician Query Response Document

 

 

PATIENT:               LEIA MONTAÑO

ACCT #:                  [id number]

MRN:                       [id number]

:                       1945

ADMIT DATE:       4/3/2017 2:18 PM

DISCH DATE:

RESPONDING

PROVIDER #:        [email or web address]

 

 

QUERY TEXT:

 

Clarification of Clinical Diagnostic Findings

 

PLEASE INDICATE IF CHEST PAIN FINDINGS WAS CLASSIFIED AS

STEMI

NSTEMI

NO MYOCARDIAL INFARCTION

OTHER

 

 

 

The patient's Clinical Indicators include:

chest pain/cardiomyopathy;

serial cardiac enzymes negative-

stress test with EF 23%, no ischemia however insensitive study

PER CARDIOLOGY

NSTEMI (non-ST elevated myocardial infarction)

EKG showing anteroseptal changes, troponin negative. No ischemia noted on nuclear, but poor specifici
ty noted. Losartan and carvedilol initiated for medication optimization.

 

 

Query created by: Naila Calero on 2017 12:14 PM

 

RESPONSE TEXT:

 

Atypical chest pain.

 

 

 

 

 

 

 

Electronically signed by:  Ele Villasenor MD 2017 5:38 AM

## 2017-04-06 VITALS
SYSTOLIC BLOOD PRESSURE: 128 MMHG | DIASTOLIC BLOOD PRESSURE: 74 MMHG | HEART RATE: 67 BPM | TEMPERATURE: 98 F | OXYGEN SATURATION: 97 % | RESPIRATION RATE: 16 BRPM

## 2017-04-06 VITALS
DIASTOLIC BLOOD PRESSURE: 59 MMHG | OXYGEN SATURATION: 96 % | HEART RATE: 61 BPM | SYSTOLIC BLOOD PRESSURE: 101 MMHG | RESPIRATION RATE: 18 BRPM | TEMPERATURE: 97.8 F

## 2017-04-06 VITALS
RESPIRATION RATE: 18 BRPM | TEMPERATURE: 97.7 F | HEART RATE: 60 BPM | DIASTOLIC BLOOD PRESSURE: 91 MMHG | SYSTOLIC BLOOD PRESSURE: 126 MMHG | OXYGEN SATURATION: 100 %

## 2017-04-06 VITALS
DIASTOLIC BLOOD PRESSURE: 52 MMHG | RESPIRATION RATE: 20 BRPM | SYSTOLIC BLOOD PRESSURE: 106 MMHG | HEART RATE: 52 BPM | TEMPERATURE: 97.9 F | OXYGEN SATURATION: 100 %

## 2017-04-06 VITALS
SYSTOLIC BLOOD PRESSURE: 103 MMHG | DIASTOLIC BLOOD PRESSURE: 60 MMHG | TEMPERATURE: 97.5 F | OXYGEN SATURATION: 100 % | HEART RATE: 60 BPM | RESPIRATION RATE: 20 BRPM

## 2017-04-06 VITALS — HEART RATE: 60 BPM

## 2017-04-06 LAB
ANION GAP SERPL CALC-SCNC: 8 MEQ/L (ref 5–15)
BUN SERPL-MCNC: 4 MG/DL (ref 7–18)
CHLORIDE SERPL-SCNC: 94 MEQ/L (ref 98–107)
GFR SERPLBLD BASED ON 1.73 SQ M-ARVRAT: 149 ML/MIN (ref 89–?)
HCO3 BLD-SCNC: 24 MEQ/L (ref 21–32)
POTASSIUM SERPL-SCNC: 4 MEQ/L (ref 3.5–5.1)
SODIUM SERPL-SCNC: 126 MEQ/L (ref 136–145)

## 2017-04-06 RX ADMIN — ASPIRIN SCH MG: 325 TABLET ORAL at 08:12

## 2017-04-06 RX ADMIN — FLUDROCORTISONE ACETATE SCH MG: 0.1 TABLET ORAL at 08:12

## 2017-04-06 RX ADMIN — LOSARTAN POTASSIUM SCH MG: 25 TABLET, FILM COATED ORAL at 08:12

## 2017-04-06 RX ADMIN — SODIUM CHLORIDE TAB 1 GM SCH GM: 1 TAB at 12:40

## 2017-04-06 RX ADMIN — DORZOLAMIDE HYDROCHLORIDE AND TIMOLOL MALEATE SCH DROP: 20; 5 SOLUTION OPHTHALMIC at 08:14

## 2017-04-06 RX ADMIN — FLUTICASONE PROPIONATE SCH SPRAY: 50 SPRAY, METERED NASAL at 08:14

## 2017-04-06 RX ADMIN — PHENYTOIN SODIUM SCH MLS/HR: 50 INJECTION INTRAMUSCULAR; INTRAVENOUS at 05:59

## 2017-04-06 RX ADMIN — SODIUM CHLORIDE TAB 1 GM SCH GM: 1 TAB at 08:12

## 2017-04-06 RX ADMIN — CARVEDILOL SCH MG: 3.12 TABLET, FILM COATED ORAL at 08:12

## 2017-04-06 NOTE — HHI.DS
__________________________________________________





Discharge Summary


Admission Date


Apr 3, 2017 at 14:18


Discharge Date:  Apr 6, 2017


Admitting Diagnosis


Chest Pain, Generalized Weakness, EKG Changes





(1) CAD (coronary artery disease)


ICD Code:  I25.10


Diagnosis:  Principal





(2) Hyponatremia


ICD Code:  E87.1


Diagnosis:  Principal





Procedures


none


Brief History - From Admission


72-year-old male presents to the emergency room for further evaluation of chest 

discomfort.  Patient is vague with symptoms however does endorse a cough for 

the past 2-3days.  States substernal chest pain only occurs with coughing.  

Seen PCP on Monday was given Levaquin and inhalers, was told he has a lung 

infection.  Yesterday endorses generalized weakness, usually uses a push walker 

or cane, however yesterday was too tired and fatigued to do either.  Recently 

had dental work completed therefore taking a soft diet consisting of oatmeal 

and mashed potatoes.  Continues to have good appetite otherwise.  No known 

fever however endorses chills since yesterday.


CBC/BMP:  


4/6/17 0610





Significant Findings





Laboratory Tests








Test 4/3/17 4/4/17 4/5/17 4/6/17





 21:42 06:12 07:42 06:10


 


Sodium Level 126 MEQ/L 124 MEQ/L 125 MEQ/L 126 MEQ/L





 (136-145) (136-145) (136-145) (136-145)


 


Chloride Level   94 MEQ/L 94 MEQ/L





   () ()


 


Blood Urea Nitrogen   4 MG/DL (7-18) 4 MG/DL (7-18)








Imaging





Last Impressions








Myocardial Perfusion Scan Nuc Med 4/1/17 0000 Signed





Impressions: 





 Service Date/Time:  Saturday, April 1, 2017 11:26 - CONCLUSION:  Ejection 





 fraction of 23%% with little viable myocardium present.  There is no ischemia 





 however exam is very insensitive for such in this setting..  RISK CATEGORY:   

  





 High (>3%% Annual Mortality Rate)      Quincy Caldwell MD  FACR


 


Chest X-Ray 3/30/17 0330 Signed





Impressions: 





 Service Date/Time:  Thursday, March 30, 2017 03:53 - CONCLUSION: No acute 





 disease.       Gurwinder Craven MD 








PE at Discharge


GENERAL: Alert, NAD. 


SKIN: Warm and dry.


HEAD: Normocephalic.


EYES: No scleral icterus. No injection or drainage. 


NECK: Supple, trachea midline. No JVD or lymphadenopathy.


CARDIOVASCULAR: Regular rate and rhythm without murmurs, gallops, or rubs. 


RESPIRATORY: Breath sounds equal bilaterally. No accessory muscle use.


GASTROINTESTINAL: Abdomen soft, non-tender, nondistended. 


MUSCULOSKELETAL: No cyanosis, or edema. 


BACK: Nontender without obvious deformity. No CVA tenderness.





Pt update on day of discharge


Patient is doing well. No acute concerns. Wants to go home. Discussed with 

daughter who is also okay with patient going home with her.


Hospital Course


Mr. Hamilton is a 72-year-old  [place] male with a history of hyperlipidemia

, hypertension, diabetes who presented to the emergency department on 3/30/2017 

due to generalized weakness that started 2 days prior to this admission.  

Troponin 3 were negative.  Patient underwent a nuclear stress test which did 

not show any ischemia but ejection fraction was calculated as 23%.  However 

echocardiogram shows ejection fraction 35-40%.  Patient also had hyponatremia 

initial sodium 127.  Sodium went down to 124.





- Chest pain


- Nonischemic cardiomyopathy


   - Continue aspirin 325 mg by mouth daily, carvedilol 3.125 mg daily, 

losartan 0.5 mg every daily. 


   - Continue atorvastatin 80 mg by mouth daily at bedtime.





- Hyponatremia - sodium 124 --> 125 --> 126.  Urine osmolality 216, urine 

sodium 78, serum osmolarity 257.


   - Continue Fludrocortisone 0.1mg Qday and continue salt tablets. 


   - BMP within 3-5 days. 





- COPD - continue breathing treatments





Discussed with patient's daughter. Patient is being discharged with home health.


Pt Condition on Discharge:  Stable


Discharge Disposition:  Disch w/ Home Health Serv


Discharge Time:  > 30 minutes


Discharge Instructions


DIET: Follow Instructions for:  Heart Healthy Diet


Activities you can perform:  Regular-No Restrictions


Follow up Referrals:  


PCP Follow-up





New Orders:  


BASIC METABOLIC PROF - 3-5 Days





New Medications:  


Aspirin DR (Aspirin EC) 81 Mg Tabdr


81 MG PO DAILY Blood Clot Prevention #90 Ref 0 TAB


Carvedilol (Coreg) 3.125 Mg Tab


3.125 MG PO DAILY Blood Pressure Management #30 TAB


Fludrocortisone (Fludrocortisone) 0.1 Mg Tab


0.1 MG PO DAILY Blood Pressure Management #30 TAB


Sodium Chloride (Sodium Chloride) 1 Gm Tab


1 GM PO TID hyponatremia #30 Ref 1 TAB


 


Continued Medications:  


Albuterol Sulfate 8 GM Inhaler (Ventolin Hfa) 8 Gm Aero


2 PUFF INH Q4 * SHAKE WELL BEFORE USE * PRN SHORTNESS OF BREATH #1 BOX


Atorvastatin (Atorvastatin) 80 Mg Tab


80 MG PO HS Cholesterol Management #30 Ref 0 TAB


Brimonidine Tartrate (Brimonidine Tartrate) 0.2 % Sol


1 DROP BID SOL


Chlorhexidine Gluconate (Mouth) Liq (Chlorhexidine Gluconate (Mouth) Liq) 0.12% 

Soln


15 ML MT BID@08,20 hygience Days 30 ML


Clopidogrel Bisulfate (Plavix) 75 Mg Tab


75 MG PO DAILY CAD Days 30 Ref 0 TAB


Dorzolamide Hcl (Trusopt) 10 Ml Soln


1 DROP EACH EYE BID ML


Dorzolamide-Timolol Opth Drops (Dorzolamide-Timolol Opth Drops) 22.3-6.8 Mg/Ml 

Soln


1 DROP EACH EYE BID Glaucoma Ref 0 BOTTLE


Fluticasone Nasal Spray (Fluticasone Nasal Spray) 50 Mcg/Act Naspr


50 MCG EACH NARE BID 50 mcg/spray Allergy Management #1 Ref 0 BOTTLE


Losartan (Cozaar) 25 Mg Tab


6.25 MG PO DAILY htn #30 TAB


Potassium Chloride Microencaps (Potassium Chloride Microencaps) 20 Meq Tab


20 MEQ PO Q12HR elec Days 10 TAB


Tiotropium Inh (Spiriva Respimat Inh) 2.5 Mcg/Act Aero


2 PUFF INH DAILY 2.5 mcg = 1 inhalation COPD #1 Ref 0 INHALER


 


Discontinued Medications:  


Levofloxacin (Levaquin) 500 Mg Tab


500 MG PO DAILY@21 Infection #2 TAB











Milton Zapata DO Apr 6, 2017 15:12

## 2017-04-08 LAB — Lab: 59 PG/ML (ref 6–50)

## 2017-04-10 LAB — ALDOST SERPL-MCNC: (no result) NG/DL

## 2017-05-13 ENCOUNTER — HOSPITAL ENCOUNTER (INPATIENT)
Dept: HOSPITAL 17 - NEPE | Age: 72
LOS: 6 days | Discharge: NURSING, REHAB OR LONG-TERM CARE | DRG: 871 | End: 2017-05-19
Attending: HOSPITALIST | Admitting: HOSPITALIST
Payer: COMMERCIAL

## 2017-05-13 VITALS
TEMPERATURE: 103.3 F | HEART RATE: 103 BPM | OXYGEN SATURATION: 97 % | RESPIRATION RATE: 16 BRPM | DIASTOLIC BLOOD PRESSURE: 84 MMHG | SYSTOLIC BLOOD PRESSURE: 138 MMHG

## 2017-05-13 VITALS
SYSTOLIC BLOOD PRESSURE: 142 MMHG | RESPIRATION RATE: 16 BRPM | HEART RATE: 103 BPM | OXYGEN SATURATION: 96 % | DIASTOLIC BLOOD PRESSURE: 73 MMHG

## 2017-05-13 VITALS
HEART RATE: 80 BPM | SYSTOLIC BLOOD PRESSURE: 135 MMHG | OXYGEN SATURATION: 95 % | TEMPERATURE: 97.9 F | RESPIRATION RATE: 16 BRPM | DIASTOLIC BLOOD PRESSURE: 79 MMHG

## 2017-05-13 VITALS
TEMPERATURE: 100.1 F | SYSTOLIC BLOOD PRESSURE: 187 MMHG | RESPIRATION RATE: 24 BRPM | OXYGEN SATURATION: 95 % | HEART RATE: 113 BPM | DIASTOLIC BLOOD PRESSURE: 94 MMHG

## 2017-05-13 VITALS
TEMPERATURE: 98.5 F | DIASTOLIC BLOOD PRESSURE: 57 MMHG | OXYGEN SATURATION: 96 % | HEART RATE: 60 BPM | RESPIRATION RATE: 16 BRPM | SYSTOLIC BLOOD PRESSURE: 102 MMHG

## 2017-05-13 VITALS — BODY MASS INDEX: 19.38 KG/M2 | HEIGHT: 67 IN | WEIGHT: 123.46 LBS

## 2017-05-13 DIAGNOSIS — J44.9: ICD-10-CM

## 2017-05-13 DIAGNOSIS — E11.9: ICD-10-CM

## 2017-05-13 DIAGNOSIS — Z78.1: ICD-10-CM

## 2017-05-13 DIAGNOSIS — Z87.891: ICD-10-CM

## 2017-05-13 DIAGNOSIS — H40.9: ICD-10-CM

## 2017-05-13 DIAGNOSIS — E78.00: ICD-10-CM

## 2017-05-13 DIAGNOSIS — H91.90: ICD-10-CM

## 2017-05-13 DIAGNOSIS — Z16.12: ICD-10-CM

## 2017-05-13 DIAGNOSIS — R64: ICD-10-CM

## 2017-05-13 DIAGNOSIS — R00.8: ICD-10-CM

## 2017-05-13 DIAGNOSIS — H54.0: ICD-10-CM

## 2017-05-13 DIAGNOSIS — G93.49: ICD-10-CM

## 2017-05-13 DIAGNOSIS — I10: ICD-10-CM

## 2017-05-13 DIAGNOSIS — E87.2: ICD-10-CM

## 2017-05-13 DIAGNOSIS — A41.50: Primary | ICD-10-CM

## 2017-05-13 LAB
ALP SERPL-CCNC: 182 U/L (ref 45–117)
ALT SERPL-CCNC: 28 U/L (ref 12–78)
AMPHETAMINE, URINE: (no result)
ANION GAP SERPL CALC-SCNC: 11 MEQ/L (ref 5–15)
APTT BLD: 24.7 SEC (ref 24.3–30.1)
AST SERPL-CCNC: 26 U/L (ref 15–37)
BARBITURATES, URINE: (no result)
BASOPHILS # BLD AUTO: 0 TH/MM3 (ref 0–0.2)
BASOPHILS NFR BLD: 0.7 % (ref 0–2)
BILIRUB SERPL-MCNC: 0.4 MG/DL (ref 0.2–1)
BUN SERPL-MCNC: 5 MG/DL (ref 7–18)
CHLORIDE SERPL-SCNC: 103 MEQ/L (ref 98–107)
CK SERPL-CCNC: 87 U/L (ref 39–308)
COCAINE UR-MCNC: (no result) NG/ML
COLOR UR: (no result)
COMMENT (UR): (no result)
CULTURE IF INDICATED: (no result)
EOSINOPHIL # BLD: 0.2 TH/MM3 (ref 0–0.4)
EOSINOPHIL NFR BLD: 3.5 % (ref 0–4)
ERYTHROCYTE [DISTWIDTH] IN BLOOD BY AUTOMATED COUNT: 16.4 % (ref 11.6–17.2)
GFR SERPLBLD BASED ON 1.73 SQ M-ARVRAT: 90 ML/MIN (ref 89–?)
GLUCOSE CSF-MCNC: 58 MG/DL (ref 40–80)
GLUCOSE UR STRIP-MCNC: (no result) MG/DL
HCO3 BLD-SCNC: 24.7 MEQ/L (ref 21–32)
HCT VFR BLD CALC: 39 % (ref 39–51)
HEMO FLAGS: (no result)
HGB UR QL STRIP: (no result)
INR PPP: 1 RATIO
KETONES UR STRIP-MCNC: (no result) MG/DL
LACTATE CSF-MCNC: 2.3 MMOL/L (ref 0–3)
LACTIC ACID GHOST: (no result)
LYMPHOCYTES # BLD AUTO: 1.6 TH/MM3 (ref 1–4.8)
LYMPHOCYTES NFR BLD AUTO: 25.3 % (ref 9–44)
MAGNESIUM SERPL-MCNC: 1.5 MG/DL (ref 1.5–2.5)
MCH RBC QN AUTO: 26.9 PG (ref 27–34)
MCHC RBC AUTO-ENTMCNC: 32.4 % (ref 32–36)
MCV RBC AUTO: 82.9 FL (ref 80–100)
MONOCYTES NFR BLD: 0.5 % (ref 0–8)
MUCOUS THREADS #/AREA URNS LPF: (no result) /LPF
NEUTROPHILS # BLD AUTO: 4.5 TH/MM3 (ref 1.8–7.7)
NEUTROPHILS NFR BLD AUTO: 70 % (ref 16–70)
NITRITE UR QL STRIP: (no result)
PLATELET # BLD: 186 TH/MM3 (ref 150–450)
POTASSIUM SERPL-SCNC: 3.6 MEQ/L (ref 3.5–5.1)
PROTHROMBIN TIME: 11.1 SEC (ref 9.8–11.6)
RBC # BLD AUTO: 4.71 MIL/MM3 (ref 4.5–5.9)
SODIUM SERPL-SCNC: 139 MEQ/L (ref 136–145)
SP GR UR STRIP: 1.01 (ref 1–1.03)
WBC # BLD AUTO: 6.4 TH/MM3 (ref 4–11)

## 2017-05-13 PROCEDURE — 96361 HYDRATE IV INFUSION ADD-ON: CPT

## 2017-05-13 PROCEDURE — 80053 COMPREHEN METABOLIC PANEL: CPT

## 2017-05-13 PROCEDURE — 96368 THER/DIAG CONCURRENT INF: CPT

## 2017-05-13 PROCEDURE — 70450 CT HEAD/BRAIN W/O DYE: CPT

## 2017-05-13 PROCEDURE — 85730 THROMBOPLASTIN TIME PARTIAL: CPT

## 2017-05-13 PROCEDURE — 83735 ASSAY OF MAGNESIUM: CPT

## 2017-05-13 PROCEDURE — 87641 MR-STAPH DNA AMP PROBE: CPT

## 2017-05-13 PROCEDURE — 87040 BLOOD CULTURE FOR BACTERIA: CPT

## 2017-05-13 PROCEDURE — 87070 CULTURE OTHR SPECIMN AEROBIC: CPT

## 2017-05-13 PROCEDURE — 36569 INSJ PICC 5 YR+ W/O IMAGING: CPT

## 2017-05-13 PROCEDURE — 87801 DETECT AGNT MULT DNA AMPLI: CPT

## 2017-05-13 PROCEDURE — 84484 ASSAY OF TROPONIN QUANT: CPT

## 2017-05-13 PROCEDURE — 83615 LACTATE (LD) (LDH) ENZYME: CPT

## 2017-05-13 PROCEDURE — 82945 GLUCOSE OTHER FLUID: CPT

## 2017-05-13 PROCEDURE — 87186 SC STD MICRODIL/AGAR DIL: CPT

## 2017-05-13 PROCEDURE — 89051 BODY FLUID CELL COUNT: CPT

## 2017-05-13 PROCEDURE — 71010: CPT

## 2017-05-13 PROCEDURE — 83605 ASSAY OF LACTIC ACID: CPT

## 2017-05-13 PROCEDURE — 96365 THER/PROPH/DIAG IV INF INIT: CPT

## 2017-05-13 PROCEDURE — 82550 ASSAY OF CK (CPK): CPT

## 2017-05-13 PROCEDURE — 80307 DRUG TEST PRSMV CHEM ANLYZR: CPT

## 2017-05-13 PROCEDURE — 81001 URINALYSIS AUTO W/SCOPE: CPT

## 2017-05-13 PROCEDURE — 85025 COMPLETE CBC W/AUTO DIFF WBC: CPT

## 2017-05-13 PROCEDURE — 84443 ASSAY THYROID STIM HORMONE: CPT

## 2017-05-13 PROCEDURE — 87205 SMEAR GRAM STAIN: CPT

## 2017-05-13 PROCEDURE — 82164 ANGIOTENSIN I ENZYME TEST: CPT

## 2017-05-13 PROCEDURE — 85610 PROTHROMBIN TIME: CPT

## 2017-05-13 PROCEDURE — 009U3ZX DRAINAGE OF SPINAL CANAL, PERCUTANEOUS APPROACH, DIAGNOSTIC: ICD-10-PCS | Performed by: INTERNAL MEDICINE

## 2017-05-13 PROCEDURE — 84157 ASSAY OF PROTEIN OTHER: CPT

## 2017-05-13 PROCEDURE — 93005 ELECTROCARDIOGRAM TRACING: CPT

## 2017-05-13 PROCEDURE — 86592 SYPHILIS TEST NON-TREP QUAL: CPT

## 2017-05-13 PROCEDURE — 76937 US GUIDE VASCULAR ACCESS: CPT

## 2017-05-13 PROCEDURE — 87529 HSV DNA AMP PROBE: CPT

## 2017-05-13 PROCEDURE — 80048 BASIC METABOLIC PNL TOTAL CA: CPT

## 2017-05-13 RX ADMIN — HEPARIN SODIUM SCH UNITS: 10000 INJECTION, SOLUTION INTRAVENOUS; SUBCUTANEOUS at 21:00

## 2017-05-13 RX ADMIN — Medication SCH ML: at 21:00

## 2017-05-13 RX ADMIN — ATORVASTATIN CALCIUM SCH MG: 80 TABLET, FILM COATED ORAL at 21:00

## 2017-05-13 RX ADMIN — CEFTRIAXONE SODIUM SCH MLS/HR: 2 INJECTION, POWDER, FOR SOLUTION INTRAMUSCULAR; INTRAVENOUS at 20:00

## 2017-05-13 RX ADMIN — FAMOTIDINE SCH MG: 20 TABLET, FILM COATED ORAL at 21:00

## 2017-05-13 RX ADMIN — ACYCLOVIR SODIUM SCH MLS/HR: 50 INJECTION, SOLUTION INTRAVENOUS at 21:00

## 2017-05-13 NOTE — RADRPT
EXAM DATE/TIME:  05/13/2017 17:08 

 

[facility] COMPARISON:     

CHEST SINGLE AP, March 30, 2017, 3:53.

 

                     

INDICATIONS :     

Fever and shortness of breath.

                     

 

MEDICAL HISTORY :            

Hypertension. Chronic obstructive pulmonary disease.   

 

SURGICAL HISTORY :     

None.   

 

ENCOUNTER:     

Initial                                        

 

ACUITY:     

1 day      

 

PAIN SCORE:     

0/10

 

LOCATION:     

Bilateral chest 

 

FINDINGS:     

Streaky densities are noted within the right perihilar region and left lung base consistent with poss
ible atelectasis and/or mild infiltrates.  The heart is stable.  The pulmonary vascular pattern is no
rmal.  

 

CONCLUSION:     

Streaky densities within the right perihilar region and left lung base consistent with atelectasis an
d/or mild infiltrates.  Clinical correlation is recommended. 

 

 Adam Zambrano MD on May 13, 2017 at 17:21                

Board Certified Radiologist.

 This report was verified electronically.

## 2017-05-13 NOTE — PD
HPI


Chief Complaint:  Altered Mental Status


Time Seen by Provider:  16:50


Travel History


International Travel<30 days:  No


Contact w/Intl Traveler<30days:  No


Traveled to known affect area:  No





History of Present Illness


HPI


Patient comes in with his daughter who is complaining patient being confused 

that has been getting progressively worse throughout the day.  States patient 

has some gait disturbances yesterday.  Daughter reports subjective fever which 

she reports giving Tylenol proximally 2 hours ago.  Denies any nausea, vomiting

, loss or change in bowel or bladder, cough, chest pain, shortness of breath, 

or abdominal pain.  Daughter does states that he was complaining of a headache 

earlier today as well as low back pain.  Denies patient complaining of anything 

else.





PFSH


Past Medical History


Asthma:  Yes


Cancer:  No


Cardiovascular Problems:  Yes


High Cholesterol:  Yes


Chest Pain:  Yes


COPD:  Yes


Diabetes:  Yes (daughter states borderline)


Diminished Hearing:  Yes ([place])


Gastrointestinal Disorders:  No


Glaucoma:  Yes


Genitourinary:  Yes


Hypertension:  Yes


Musculoskeletal:  Yes


Neurologic:  No


Psychiatric:  No


Respiratory:  Yes (COPD)


Tetanus Vaccination:  < 5 Years


Influenza Vaccination:  Yes





Past Surgical History


Ear Surgery:  Yes (BILATERAL CATARACT REMOVAL)


Eye Surgery:  Yes (CATARACT REMOVAL)





Social History


Alcohol Use:  No


Tobacco Use:  Yes


Substance Use:  No





Allergies-Medications


(Allergen,Severity, Reaction):  


Coded Allergies:  


     Contrast Media (Verified  Allergy, Mild, HIVES, ITCHING, 5/13/17)


Reported Meds & Prescriptions





Reported Meds & Active Scripts


Active


Sodium Chloride 1 Gm Tab 1 Gm PO TID


Aspirin EC (Aspirin) 81 Mg Tabdr 81 Mg PO DAILY


Potassium Chloride Microencaps 20 Meq Tab 20 Meq PO Q12HR 10 Days


Reported


Metoprolol Succinate ER 24 HR (Metoprolol Succinate) 50 Mg Tab 50 Mg PO DAILY


Lorazepam 0.5 Mg Tab 0.5 Mg PO DAILY PRN


Cetirizine (Cetirizine HCl) 10 Mg Tab 10 Mg PO DAILY


Amlodipine (Amlodipine Besylate) 2.5 Mg Tab 2.5 Mg PO DAILY


Omeprazole 40 Mg Cap 40 Mg PO DAILY


Atorvastatin (Atorvastatin Calcium) 80 Mg Tab 80 Mg PO HS








Review of Systems


ROS Limitations:  Altered Mental Status


Except as stated in HPI:  all other systems reviewed are Neg





Physical Exam


Exam Limitations:  Altered Mental Status


Narrative


GENERAL: Well-developed, well nourished, in no acute distress, and non-ill 

appearing.


SKIN: Focused skin assessment warm and dry.


HEAD: Atraumatic. Normocephalic. 


EYES: Pupils equal and round. EOMI. No scleral icterus. No injection or 

drainage. 


ENT: No nasal bleeding or discharge.  Mucous membranes pink and moist.


NECK: Trachea midline. No JVD. Supple.  No nuclear rigidity.


CARDIOVASCULAR: Regular rate and rhythm.  No murmur appreciated.


RESPIRATORY: No accessory muscle use.  No respiratory distress. Clear to 

auscultation. Breath sounds equal bilaterally. 


MUSCULOSKELETAL: No obvious deformities. No clubbing.  No cyanosis.  No edema.  

Full range of motion.


NEUROLOGICAL: Awake and alert. No obvious cranial nerve deficits.  Motor 

grossly within normal limits. Normal speech.


PSYCHIATRIC: Confused.





Data


Data


Last Documented VS





Vital Signs








  Date Time  Temp Pulse Resp B/P Pulse Ox O2 Delivery O2 Flow Rate FiO2


 


5/13/17 17:30 103.3 103 16 141/84 97 Nasal Cannula 2 








Orders





 Electrocardiogram (5/13/17 16:54)


Complete Blood Count With Diff (5/13/17 16:54)


Comprehensive Metabolic Panel (5/13/17 16:54)


Prothrombin Time / Inr (Pt) (5/13/17 16:54)


Act Partial Throm Time (Ptt) (5/13/17 16:54)


Lactic Acid Sepsis Protocol (5/13/17 16:54)


Magnesium (Mg) (5/13/17 16:54)


Ckmb (Isoenzyme) Profile (5/13/17 16:54)


Troponin I (5/13/17 16:54)


Urinalysis - C+S If Indicated (5/13/17 16:54)


Blood Culture (5/13/17 16:54)


Chest, Single Ap (5/13/17 16:54)


Blood Glucose (5/13/17 16:54)


Ecg Monitoring (5/13/17 16:54)


Iv Access Insert/Monitor (5/13/17 16:54)


Oximetry (5/13/17 16:54)


Oxygen Administration (5/13/17 16:54)


Ibuprofen (Motrin) (5/13/17 17:00)


Vancomycin Inj (Vancomycin Inj) (5/13/17 16:54)


Piperacil-Tazo 4.5 Gm Premix (Zosyn 4.5 (5/13/17 16:54)


Sodium Chlor 0.9% 1000 Ml Inj (Ns 1000 M (5/13/17 16:54)


Sodium Chlor 0.9% 1000 Ml Inj (Ns 1000 M (5/13/17 16:54)


Ct Brain W/O Iv Contrast(Rout) (5/13/17 )


Sodium Chlor 0.9% 1000 Ml Inj (Ns 1000 M (5/13/17 18:00)


Admit Order (Ed Use Only) (5/13/17 19:20)





Labs





 Laboratory Tests








Test 5/13/17 5/13/17





 16:50 17:10


 


White Blood Count 6.4 TH/MM3 


 


Red Blood Count 4.71 MIL/MM3 


 


Hemoglobin 12.6 GM/DL 


 


Hematocrit 39.0 % 


 


Mean Corpuscular Volume 82.9 FL 


 


Mean Corpuscular Hemoglobin 26.9 PG 


 


Mean Corpuscular Hemoglobin 32.4 % 





Concent  


 


Red Cell Distribution Width 16.4 % 


 


Platelet Count 186 TH/MM3 


 


Mean Platelet Volume 8.2 FL 


 


Neutrophils (%) (Auto) 70.0 % 


 


Lymphocytes (%) (Auto) 25.3 % 


 


Monocytes (%) (Auto) 0.5 % 


 


Eosinophils (%) (Auto) 3.5 % 


 


Basophils (%) (Auto) 0.7 % 


 


Neutrophils # (Auto) 4.5 TH/MM3 


 


Lymphocytes # (Auto) 1.6 TH/MM3 


 


Monocytes # (Auto) 0.0 TH/MM3 


 


Eosinophils # (Auto) 0.2 TH/MM3 


 


Basophils # (Auto) 0.0 TH/MM3 


 


CBC Comment DIFF FINAL  


 


Differential Comment   


 


Prothrombin Time 11.1 SEC 


 


Prothromb Time International 1.0 RATIO 





Ratio  


 


Activated Partial 24.7 SEC 





Thromboplast Time  


 


Sodium Level 139 MEQ/L 


 


Potassium Level 3.6 MEQ/L 


 


Chloride Level 103 MEQ/L 


 


Carbon Dioxide Level 24.7 MEQ/L 


 


Anion Gap 11 MEQ/L 


 


Blood Urea Nitrogen 5 MG/DL 


 


Creatinine 0.99 MG/DL 


 


Estimat Glomerular Filtration 90 ML/MIN 





Rate  


 


Random Glucose 100 MG/DL 


 


Lactic Acid Level 6.8 mmol/L 


 


Calcium Level 8.8 MG/DL 


 


Magnesium Level 1.5 MG/DL 


 


Total Bilirubin 0.4 MG/DL 


 


Aspartate Amino Transf 26 U/L 





(AST/SGOT)  


 


Alanine Aminotransferase 28 U/L 





(ALT/SGPT)  


 


Alkaline Phosphatase 182 U/L 


 


Total Creatine Kinase 87 U/L 


 


Troponin I LESS THAN 0.02 





 NG/ML 


 


Total Protein 7.5 GM/DL 


 


Albumin 3.4 GM/DL 


 


Urine Color  LIGHT-YELLOW 


 


Urine Turbidity  CLEAR 


 


Urine pH  5.0 


 


Urine Specific Gravity  1.007 


 


Urine Protein  NEG mg/dL


 


Urine Glucose (UA)  NEG mg/dL


 


Urine Ketones  NEG mg/dL


 


Urine Occult Blood  NEG 


 


Urine Nitrite  NEG 


 


Urine Bilirubin  NEG 


 


Urine Urobilinogen  LESS THAN 2.0





  MG/DL


 


Urine Leukocyte Esterase  NEG 


 


Urine RBC  1 /hpf


 


Urine WBC  LESS THAN 1





  /hpf


 


Urine Mucus  FEW /lpf


 


Microscopic Urinalysis Comment  CULT NOT





  INDICATED











MDM


Medical Decision Making


Medical Screen Exam Complete:  Yes


Emergency Medical Condition:  Yes


Interpretation(s)


EKG reviewed by Dr. Cristina.  Shows sinus tachycardia with ventricular rate of 

101.  No change from previous EKG on 3/30/17.  No STEMI.





Chest x-ray read by the radiologist shows: Streaky densities within the right 

perihilar region and left lung base consistent with atelectasis and/or mild 

infiltrates. Clinical correlation is recommended.


Differential Diagnosis


Sepsis, pneumonia, UTI, allegedly abnormality, dehydration, bacteremia, other


Narrative Course


Patient was seen exam.  Initial laboratory radiologic studies were obtained and 

reviewed with the exception of a CT of the head that has not been obtained yet.

  Patient was given IV fluids and IV antibiotics.  Discussed patient with Dr. Corbett, who saw and evaluated the patient and discussed patient with Dr. Nichole, who 

is agreeable to admit the patient.





Sepsis Criteria


SIRS Criteria (2 or more):  Temp > 100.9 or < 96.8, Heart rate over 90


Severe Sepsis (+one):  Lactate >2





Diagnosis





 Primary Impression:  


 Sepsis


 Qualified Code:  A41.9 - Sepsis, due to unspecified organism


 Additional Impression:  


 Altered mental status, unspecified


Condition:  Stable








Cuba Horton May 13, 2017 16:50

## 2017-05-13 NOTE — PD
Physical Exam


Narrative


I, Dr. Corbett, have reviewed the advance practice practitioner's documentation and 

am in agreement, met with the patient face to face, made the diagnosis, and the 

medical decision making was done by me.  





*My assessment and Findings: Sepsis secondary to UTI vs. Pneumonia vs. 

meningitis





71yo M who is blind was brought in here by daughter because he seems more 

confused to her.  States he talks but sometimes doesnt make sense.  Had fever 

today.  States he has been coughing and it has gotten better.  Labs reviewed, 

no leukocytosis.  Lactic acid 6.8.  UA showed few mucus.  AAOx1.  Pt is febrile 

and tachycardic here.  NS IVF x2.  Pt was empirically covered with vancomycin 

and zosyn.  CXR showed perihilar and left base atelectasis or infiltrate.  

Given that pt is more confuse as per daughter and does not have any other 

source for her sepsis, meningitis is in the differential.  Although she does 

not have any nuchal rigidity, will do CT brain and LP.  He will add antibiotics 

to cover meningitis.  Discussed with Dr. Nichole who has accepted her to the ICU 

under his service for AMS and severe sepsis.





Data


Data


Last Documented VS





Vital Signs








  Date Time  Temp Pulse Resp B/P Pulse Ox O2 Delivery O2 Flow Rate FiO2


 


5/13/17 17:30 103.3 103 16 141/84 97 Nasal Cannula 2 








Orders





 Electrocardiogram (5/13/17 16:54)


Complete Blood Count With Diff (5/13/17 16:54)


Comprehensive Metabolic Panel (5/13/17 16:54)


Prothrombin Time / Inr (Pt) (5/13/17 16:54)


Act Partial Throm Time (Ptt) (5/13/17 16:54)


Lactic Acid Sepsis Protocol (5/13/17 16:54)


Magnesium (Mg) (5/13/17 16:54)


Ckmb (Isoenzyme) Profile (5/13/17 16:54)


Troponin I (5/13/17 16:54)


Urinalysis - C+S If Indicated (5/13/17 16:54)


Blood Culture (5/13/17 16:54)


Chest, Single Ap (5/13/17 16:54)


Blood Glucose (5/13/17 16:54)


Ecg Monitoring (5/13/17 16:54)


Iv Access Insert/Monitor (5/13/17 16:54)


Oximetry (5/13/17 16:54)


Oxygen Administration (5/13/17 16:54)


Ibuprofen (Motrin) (5/13/17 17:00)


Vancomycin Inj (Vancomycin Inj) (5/13/17 16:54)


Piperacil-Tazo 4.5 Gm Premix (Zosyn 4.5 (5/13/17 16:54)


Sodium Chlor 0.9% 1000 Ml Inj (Ns 1000 M (5/13/17 16:54)


Sodium Chlor 0.9% 1000 Ml Inj (Ns 1000 M (5/13/17 16:54)


Ct Brain W/O Iv Contrast(Rout) (5/13/17 )


Sodium Chlor 0.9% 1000 Ml Inj (Ns 1000 M (5/13/17 18:00)


Admit Order (Ed Use Only) (5/13/17 19:20)





Labs





 Laboratory Tests








Test 5/13/17 5/13/17





 16:50 17:10


 


White Blood Count 6.4 TH/MM3 


 


Red Blood Count 4.71 MIL/MM3 


 


Hemoglobin 12.6 GM/DL 


 


Hematocrit 39.0 % 


 


Mean Corpuscular Volume 82.9 FL 


 


Mean Corpuscular Hemoglobin 26.9 PG 


 


Mean Corpuscular Hemoglobin 32.4 % 





Concent  


 


Red Cell Distribution Width 16.4 % 


 


Platelet Count 186 TH/MM3 


 


Mean Platelet Volume 8.2 FL 


 


Neutrophils (%) (Auto) 70.0 % 


 


Lymphocytes (%) (Auto) 25.3 % 


 


Monocytes (%) (Auto) 0.5 % 


 


Eosinophils (%) (Auto) 3.5 % 


 


Basophils (%) (Auto) 0.7 % 


 


Neutrophils # (Auto) 4.5 TH/MM3 


 


Lymphocytes # (Auto) 1.6 TH/MM3 


 


Monocytes # (Auto) 0.0 TH/MM3 


 


Eosinophils # (Auto) 0.2 TH/MM3 


 


Basophils # (Auto) 0.0 TH/MM3 


 


CBC Comment DIFF FINAL  


 


Differential Comment   


 


Prothrombin Time 11.1 SEC 


 


Prothromb Time International 1.0 RATIO 





Ratio  


 


Activated Partial 24.7 SEC 





Thromboplast Time  


 


Sodium Level 139 MEQ/L 


 


Potassium Level 3.6 MEQ/L 


 


Chloride Level 103 MEQ/L 


 


Carbon Dioxide Level 24.7 MEQ/L 


 


Anion Gap 11 MEQ/L 


 


Blood Urea Nitrogen 5 MG/DL 


 


Creatinine 0.99 MG/DL 


 


Estimat Glomerular Filtration 90 ML/MIN 





Rate  


 


Random Glucose 100 MG/DL 


 


Lactic Acid Level 6.8 mmol/L 


 


Calcium Level 8.8 MG/DL 


 


Magnesium Level 1.5 MG/DL 


 


Total Bilirubin 0.4 MG/DL 


 


Aspartate Amino Transf 26 U/L 





(AST/SGOT)  


 


Alanine Aminotransferase 28 U/L 





(ALT/SGPT)  


 


Alkaline Phosphatase 182 U/L 


 


Total Creatine Kinase 87 U/L 


 


Troponin I LESS THAN 0.02 





 NG/ML 


 


Total Protein 7.5 GM/DL 


 


Albumin 3.4 GM/DL 


 


Urine Color  LIGHT-YELLOW 


 


Urine Turbidity  CLEAR 


 


Urine pH  5.0 


 


Urine Specific Gravity  1.007 


 


Urine Protein  NEG mg/dL


 


Urine Glucose (UA)  NEG mg/dL


 


Urine Ketones  NEG mg/dL


 


Urine Occult Blood  NEG 


 


Urine Nitrite  NEG 


 


Urine Bilirubin  NEG 


 


Urine Urobilinogen  LESS THAN 2.0





  MG/DL


 


Urine Leukocyte Esterase  NEG 


 


Urine RBC  1 /hpf


 


Urine WBC  LESS THAN 1





  /hpf


 


Urine Mucus  FEW /lpf


 


Microscopic Urinalysis Comment  CULT NOT





  INDICATED


 


Urine Opiates Screen  NEG 


 


Urine Barbiturates Screen  NEG 


 


Urine Amphetamines Screen  NEG 


 


Urine Benzodiazepines Screen  NEG 


 


Urine Cocaine Screen  NEG 


 


Urine Cannabinoids Screen  NEG 











MDM


Supervised Visit with [facility]:  Yes


Critical Care Narrative


Aggregate critical care time was 40 minutes.  Time to perform other separately 

billable procedure was not included in the critical care time.  My time did not 

include minutes spent treating any other patients simultaneously or on 

activities that did not directly contribute to the patient's treatment.





The services I provided to this patient were to treat and/or prevent clinically 

significant deterioration that could result in: cardiovascular collapse or 

death.





I provided critical care services requiring my management, as noted below:


Chart data review, documentation time, medication orders and management, vital 

sign


assessments/reviewing monitor data, ordering and reviewing lab tests, ordering 

and interpreting/reviewing x-


rays and diagnostic studies, care of the patient and discussion of the patient 

with the admitting physicians.


Diagnosis





 Primary Impression:  


 Altered mental status, unspecified


 Additional Impression:  


 Sepsis


 Qualified Code:  A41.9 - Sepsis, due to unspecified organism





Admitting Information


Admitting Physician Requests:  Admit








Maya Corbett DO May 13, 2017 18:02

## 2017-05-13 NOTE — RADRPT
EXAM DATE/TIME:  05/13/2017 21:33 

 

[facility] COMPARISON:     

CT BRAIN W/O CONTRAST, December 25, 2016, 14:47.

 

 

INDICATIONS :     

Altered mental status.

                      

 

RADIATION DOSE:     

56.35 CTDIvol (mGy) 

 

 

 

MEDICAL HISTORY :     

Dementia. Cardiovascular disease. Hypertension. COPD

 

SURGICAL HISTORY :      

None. 

 

ENCOUNTER:      

Initial

 

ACUITY:      

1 day

 

PAIN SCALE:      

Non-responsive

 

LOCATION:       

Cranial 

 

TECHNIQUE:     

Multiple contiguous axial images were obtained of the head.  Using automated exposure control and adj
ustment of the mA and/or kV according to patient size, radiation dose was kept as low as reasonably a
chievable to obtain optimal diagnostic quality images. 

 

FINDINGS:     

Diffuse cerebral atrophy is again noted.  Severe periventricular and subcortical white matter small v
essel ischemic changes are noted bilaterally.  There is no acute infarct, acute hemorrhage, mass effe
ct or extra-axial fluid collections.  The bone windows are unremarkable. 

 

CONCLUSION:     

1.  Severe diffuse periventricular and subcortical white matter small vessel ischemic changes bilater
ally.

2.  Diffuse cerebral atrophy.

3.  No acute infarct, acute hemorrhage, mass effect or extra-axial fluid collections. 

 

 

 Adam Zambrano MD on May 13, 2017 at 21:40                

Board Certified Radiologist.

 This report was verified electronically.

## 2017-05-13 NOTE — HHI.HP
HPI


Service


Critical Care Medicine


Primary Care Physician


Non-Staff


Admission Diagnosis


AMS, sepsis


Diagnosis:  


Travel History


International Travel<30 Days:  No


Contact w/Intl Traveler <30 Da:  No


Traveled to Known Affected Are:  No


History of Present Illness


72-year-old gentleman brought by his daughter who is complaining patient being 

confused that has been getting progressively worse throughout the day.  She 

states that patient has had some gait disturbances yesterday.  Daughter reports 

subjective fever which she reports giving Tylenol proximally 2 hours ago.  

Denies any nausea, vomiting, loss or change in bowel or bladder, cough, chest 

pain, shortness of breath, or abdominal pain.  Daughter does states that he was 

complaining of a headache earlier today as well as low back pain.  Denies 

patient complaining of anything else.





Review of Systems


ROS


Unable to obtain patient is confused





Past Family Social History


Allergies:  


Coded Allergies:  


     Contrast Media (Verified  Allergy, Mild, HIVES, ITCHING, 5/13/17)


Past Medical History


Asthma


COPD


Glaucoma


Hypertension


Past Surgical History


Cataract surgery


Reported Medications





Reported Meds & Active Scripts


Active


Sodium Chloride 1 Gm Tab 1 Gm PO TID


Aspirin EC (Aspirin) 81 Mg Tabdr 81 Mg PO DAILY


Potassium Chloride Microencaps 20 Meq Tab 20 Meq PO Q12HR 10 Days


Reported


Metoprolol Succinate ER 24 HR (Metoprolol Succinate) 50 Mg Tab 50 Mg PO DAILY


Lorazepam 0.5 Mg Tab 0.5 Mg PO DAILY PRN


Cetirizine (Cetirizine HCl) 10 Mg Tab 10 Mg PO DAILY


Amlodipine (Amlodipine Besylate) 2.5 Mg Tab 2.5 Mg PO DAILY


Omeprazole 40 Mg Cap 40 Mg PO DAILY


Atorvastatin (Atorvastatin Calcium) 80 Mg Tab 80 Mg PO HS


Active Ordered Medications





Current Medications








 Medications


  (Trade)  Dose


 Ordered  Sig/Kaia


 Route


 PRN Reason  Start Time


 Stop Time Status Last Admin


Dose Admin


 


 Sodium Chloride


  (NS 1000 ml Inj)  1,000 ml @ 


 125 mls/hr  Q8H


 IV


   5/13/17 19:28


     


 


 


 Sodium Chloride


  (NS Flush)  2 ml  UNSCH  PRN


 IV FLUSH


 FLUSH AFTER USING IV ACCESS  5/13/17 19:30


     


 


 


 Sodium Chloride


  (NS Flush)  2 ml  BID


 IV FLUSH


   5/13/17 21:00


     


 


 


 Famotidine


  (Pepcid)  20 mg  Q12HR


 PO


   5/13/17 21:00


     


 


 


 Heparin Sodium


  (Porcine) 5000


 units  5,000 units  Q12H


 SQ


   5/13/17 21:00


     


 


 


 Ceftriaxone


 Sodium 2000 mg/


 Sodium Chloride  100 ml @ 


 200 mls/hr  Q12H


 IV


   5/13/17 20:00


     


 


 


 Pharmacy Profile


 Note  0 ml @ 0


 mls/hr  UNSCH


 OTHER


   5/13/17 19:30


     


 


 


 Ampicillin Sodium


 2000 mg/Sodium


 Chloride  100 ml @ 


 300 mls/hr  Q4H


 IV


   5/13/17 21:00


     


 


 


 Acyclovir Sodium


 582 mg/Sodium


 Chloride  100 ml @ 


 100 mls/hr  Q8H


 IV


   5/13/17 21:00


     


 


 


 Sodium Chloride  1,000 ml @ 


 1,000 mls/hr  Q1H ONCE


 IV


   5/13/17 19:28


 5/13/17 20:27   


 


 


 Sodium Chloride


  (NS 1000 ml Inj)  1,000 ml @ 


 1,000 mls/hr  Q1H ONCE


 IV


   5/13/17 19:28


 5/13/17 20:27   


 


 


 Miscellaneous


 Information  1  Q361D


 XX


   5/13/17 19:30


     


 


 


 Chlorhexidine


 Gluconate


  (Chlorhexidine


 2% Cloth)  3 pack


 Taper  DAILY@04


 TOP


   5/14/17 04:00


 5/10/18 03:59   


 


 


 Chlorhexidine


 Gluconate


  (Chlorhexidine


 2% Cloth)  3 pack  UNSCH  PRN


 TOP


 HYGIENIC CARE  5/13/17 19:30


     


 


 


 Amlodipine


 Besylate


  (Norvasc)  2.5 mg  DAILY


 PO


   5/14/17 09:00


     


 


 


 Aspirin


  (Ecotrin Ec)  81 mg  DAILY


 PO


   5/14/17 09:00


     


 


 


 Atorvastatin


 Calcium


  (Lipitor)  80 mg  HS


 PO


   5/13/17 21:00


     


 


 


 Lorazepam


  (Ativan)  0.5 mg  DAILY  PRN


 PO


 ANXIETY  5/13/17 19:45


     


 


 


 Metoprolol


 Succinate


  (Toprol Xl)  50 mg  DAILY


 PO


   5/14/17 09:00


     


 


 


 Sodium Chloride


  (Sodium Chloride)  1 gm  TID


 PO


   5/14/17 09:00


     


 








Family History


Noncontributory


Social History


Smokes pack per day


No alcohol or illicit drug abuse





Physical Exam


Vital Signs





 Vital Signs








  Date Time  Temp Pulse Resp B/P Pulse Ox O2 Delivery O2 Flow Rate FiO2


 


5/13/17 17:30 103.3 103 16 141/84 97 Nasal Cannula 2 


 


5/13/17 17:30     98 Nasal Cannula 2 


 


5/13/17 17:30    138/82    


 


5/13/17 16:40   16  96 Room Air  


 


5/13/17 16:37  103 16 142/73 96   


 


5/13/17 16:28 100.1 113 24 187/94 95   








Physical Exam


GENERAL: Cachectic elderly man very confused


SKIN: Warm and dry.


HEAD: Normocephalic.


EYES: No scleral icterus. No injection or drainage. 


NECK: Supple, trachea midline. No JVD or lymphadenopathy.


CARDIOVASCULAR: Regular rate and rhythm without murmurs, gallops, or rubs. 


RESPIRATORY: Breath sounds equal bilaterally. No accessory muscle use.


GASTROINTESTINAL: Abdomen soft, non-tender, nondistended. 


MUSCULOSKELETAL: No cyanosis, or edema. 


BACK: Nontender without obvious deformity. No CVA tenderness.


EXTREMITIES: No clubbing cyanosis or edema


Laboratory





Laboratory Tests








Test 5/13/17 5/13/17





 16:50 17:10


 


White Blood Count 6.4  


 


Red Blood Count 4.71  


 


Hemoglobin 12.6  


 


Hematocrit 39.0  


 


Mean Corpuscular Volume 82.9  


 


Mean Corpuscular Hemoglobin 26.9  


 


Mean Corpuscular Hemoglobin 32.4  





Concent  


 


Red Cell Distribution Width 16.4  


 


Platelet Count 186  


 


Mean Platelet Volume 8.2  


 


Neutrophils (%) (Auto) 70.0  


 


Lymphocytes (%) (Auto) 25.3  


 


Monocytes (%) (Auto) 0.5  


 


Eosinophils (%) (Auto) 3.5  


 


Basophils (%) (Auto) 0.7  


 


Neutrophils # (Auto) 4.5  


 


Lymphocytes # (Auto) 1.6  


 


Monocytes # (Auto) 0.0  


 


Eosinophils # (Auto) 0.2  


 


Basophils # (Auto) 0.0  


 


CBC Comment DIFF FINAL  


 


Differential Comment   


 


Prothrombin Time 11.1  


 


Prothromb Time International 1.0  





Ratio  


 


Activated Partial 24.7  





Thromboplast Time  


 


Sodium Level 139  


 


Potassium Level 3.6  


 


Chloride Level 103  


 


Carbon Dioxide Level 24.7  


 


Anion Gap 11  


 


Blood Urea Nitrogen 5  


 


Creatinine 0.99  


 


Estimat Glomerular Filtration 90  





Rate  


 


Random Glucose 100  


 


Lactic Acid Level 6.8  


 


Calcium Level 8.8  


 


Magnesium Level 1.5  


 


Total Bilirubin 0.4  


 


Aspartate Amino Transf 26  





(AST/SGOT)  


 


Alanine Aminotransferase 28  





(ALT/SGPT)  


 


Alkaline Phosphatase 182  


 


Total Creatine Kinase 87  


 


Troponin I LESS THAN 0.02  


 


Total Protein 7.5  


 


Albumin 3.4  


 


Urine Color  LIGHT-YELLOW 


 


Urine Turbidity  CLEAR 


 


Urine pH  5.0 


 


Urine Specific Gravity  1.007 


 


Urine Protein  NEG 


 


Urine Glucose (UA)  NEG 


 


Urine Ketones  NEG 


 


Urine Occult Blood  NEG 


 


Urine Nitrite  NEG 


 


Urine Bilirubin  NEG 


 


Urine Urobilinogen  LESS THAN 2.0 


 


Urine Leukocyte Esterase  NEG 


 


Urine RBC  1 


 


Urine WBC  LESS THAN 1 


 


Urine Mucus  FEW 


 


Microscopic Urinalysis Comment  CULT NOT





  INDICATED














 Date/Time Procedure Status





Source Growth 


 


 5/13/17 17:20 Aerobic Blood Culture Received





Blood Peripheral Pending 





 5/13/17 17:20 Anaerobic Blood Culture Received





Blood Peripheral Pending 








Result Diagram:  


5/13/17 1650                                                                   

             5/13/17 1650








Assessment and Plan


Assessment and Plan


Altered mental status


- CT head negative


- History of fever and headache


- LP


- Broad-spectrum antibiotics and antiviral to cover for meningitis


- Follow-up cultures and CSF analysis results





Lactic acidosis


- Unclear source of infection


- Broad-spectrum antibiotics


- Infectious disease consult


- Panculture





COPD


- No exacerbation


- No steroids indicated


- DuoNeb's when necessary





DVT GI prophylaxis


- Subcutaneous heparin


- Pepcid





Critical Care:


The total critical care time was 35 minutes. Time to perform other separately 

billable procedures was not included in the critical care time.








Teja Nichole MD May 13, 2017 19:38

## 2017-05-13 NOTE — PD.PROCEDR
Procedure Note


Procedure


Procedure - Lumbar Puncture


Indication -altered mental status headache and fever


Anesthesia - local 1% lidocaine w/ epi


Informed consent was obtained from the patient's mother. The area was prepped 

and


draped in the usual sterile fashion. Using landmarks, a 22 guage spinal needle 

was


inserted in the L4-L5 innerspace. The stylet was removed and the opening 

pressure was


measured at 18 cm of water. 4cc of clear fluid was collected and sent for 

routine studies.


CSF was also sent for HSV and EBV PCR.


The patient tolerated the procedure well. There was no blood loss or hematoma








Teja Nichole MD May 13, 2017 22:48

## 2017-05-14 VITALS
HEART RATE: 61 BPM | RESPIRATION RATE: 20 BRPM | TEMPERATURE: 97.4 F | SYSTOLIC BLOOD PRESSURE: 138 MMHG | OXYGEN SATURATION: 94 % | DIASTOLIC BLOOD PRESSURE: 68 MMHG

## 2017-05-14 VITALS
RESPIRATION RATE: 18 BRPM | SYSTOLIC BLOOD PRESSURE: 109 MMHG | TEMPERATURE: 99 F | OXYGEN SATURATION: 96 % | HEART RATE: 56 BPM | DIASTOLIC BLOOD PRESSURE: 58 MMHG

## 2017-05-14 VITALS
HEART RATE: 59 BPM | SYSTOLIC BLOOD PRESSURE: 106 MMHG | TEMPERATURE: 98.5 F | RESPIRATION RATE: 18 BRPM | DIASTOLIC BLOOD PRESSURE: 54 MMHG | OXYGEN SATURATION: 95 %

## 2017-05-14 VITALS
HEART RATE: 63 BPM | SYSTOLIC BLOOD PRESSURE: 112 MMHG | DIASTOLIC BLOOD PRESSURE: 58 MMHG | OXYGEN SATURATION: 95 % | TEMPERATURE: 96 F | RESPIRATION RATE: 20 BRPM

## 2017-05-14 VITALS
SYSTOLIC BLOOD PRESSURE: 132 MMHG | OXYGEN SATURATION: 95 % | HEART RATE: 61 BPM | RESPIRATION RATE: 24 BRPM | TEMPERATURE: 98.8 F | DIASTOLIC BLOOD PRESSURE: 60 MMHG

## 2017-05-14 VITALS — HEART RATE: 58 BPM

## 2017-05-14 VITALS
TEMPERATURE: 98.5 F | OXYGEN SATURATION: 96 % | HEART RATE: 65 BPM | RESPIRATION RATE: 22 BRPM | DIASTOLIC BLOOD PRESSURE: 61 MMHG | SYSTOLIC BLOOD PRESSURE: 129 MMHG

## 2017-05-14 VITALS — HEART RATE: 62 BPM

## 2017-05-14 LAB
ALP SERPL-CCNC: 132 U/L (ref 45–117)
ALT SERPL-CCNC: 22 U/L (ref 12–78)
ANION GAP SERPL CALC-SCNC: 6 MEQ/L (ref 5–15)
AST SERPL-CCNC: 36 U/L (ref 15–37)
BASOPHILS # BLD AUTO: 0.1 TH/MM3 (ref 0–0.2)
BASOPHILS NFR BLD: 0.6 % (ref 0–2)
BILIRUB SERPL-MCNC: 0.3 MG/DL (ref 0.2–1)
BUN SERPL-MCNC: 7 MG/DL (ref 7–18)
CHLORIDE SERPL-SCNC: 115 MEQ/L (ref 98–107)
COLOR CSF: CLEAR
EOSINOPHIL # BLD: 0.4 TH/MM3 (ref 0–0.4)
EOSINOPHIL NFR BLD: 2.7 % (ref 0–4)
ERYTHROCYTE [DISTWIDTH] IN BLOOD BY AUTOMATED COUNT: 15.8 % (ref 11.6–17.2)
GFR SERPLBLD BASED ON 1.73 SQ M-ARVRAT: 117 ML/MIN (ref 89–?)
GROSS BLOOD TUBE #1: (no result)
GROSS BLOOD TUBE #2: (no result)
GROSS BLOOD TUBE #4: (no result)
HCO3 BLD-SCNC: 24.7 MEQ/L (ref 21–32)
HCT VFR BLD CALC: 33 % (ref 39–51)
HEMO FLAGS: (no result)
LYMPHOCYTES # BLD AUTO: 1.8 TH/MM3 (ref 1–4.8)
LYMPHOCYTES NFR BLD AUTO: 11.8 % (ref 9–44)
LYMPHOCYTES NFR CSF MANUAL: 32 %
MCH RBC QN AUTO: 26.1 PG (ref 27–34)
MCHC RBC AUTO-ENTMCNC: 31.8 % (ref 32–36)
MCV RBC AUTO: 81.9 FL (ref 80–100)
MONOCYTES NFR BLD: 4.3 % (ref 0–8)
MONOCYTES NFR CSF: 4 %
NEUTROPHILS # BLD AUTO: 12.5 TH/MM3 (ref 1.8–7.7)
NEUTROPHILS NFR BLD AUTO: 80.6 % (ref 16–70)
NEUTROPHILS NFR CSF: 64 %
OTHER ELEMENTS URNS MICRO: 1.7 ML
PLATELET # BLD: 138 TH/MM3 (ref 150–450)
POTASSIUM SERPL-SCNC: 4 MEQ/L (ref 3.5–5.1)
RBC # BLD AUTO: 4.03 MIL/MM3 (ref 4.5–5.9)
SODIUM SERPL-SCNC: 146 MEQ/L (ref 136–145)
SPECIMEN VOL CSF: 1.6 ML
SUPERNATE COLOR TUBE #2: CLEAR
SUPERNATE COLOR TUBE #4: CLEAR
TRICHOMONAS UR QL MICRO: CLEAR
VOLUME TUBE # 2: 1.5 ML
VOLUME TUBE # 4: 1 ML
WBC # BLD AUTO: 15.5 TH/MM3 (ref 4–11)
WBC TUBE4 # CSF: 11 /MM3 (ref 0–10)
YEAST URNS QL MICRO: 0

## 2017-05-14 RX ADMIN — AMPICILLIN SODIUM SCH MLS/HR: 2 INJECTION, POWDER, FOR SOLUTION INTRAMUSCULAR; INTRAVENOUS at 06:35

## 2017-05-14 RX ADMIN — Medication SCH ML: at 08:58

## 2017-05-14 RX ADMIN — PHENYTOIN SODIUM SCH MLS/HR: 50 INJECTION INTRAMUSCULAR; INTRAVENOUS at 22:30

## 2017-05-14 RX ADMIN — HEPARIN SODIUM SCH UNITS: 10000 INJECTION, SOLUTION INTRAVENOUS; SUBCUTANEOUS at 08:59

## 2017-05-14 RX ADMIN — SODIUM CHLORIDE SCH MLS/HR: 900 INJECTION INTRAVENOUS at 12:57

## 2017-05-14 RX ADMIN — FAMOTIDINE SCH MG: 20 TABLET, FILM COATED ORAL at 09:03

## 2017-05-14 RX ADMIN — MUPIROCIN CALCIUM SCH APPLIC: 20 OINTMENT TOPICAL at 22:27

## 2017-05-14 RX ADMIN — ASPIRIN SCH MG: 81 TABLET ORAL at 09:03

## 2017-05-14 RX ADMIN — PHENYTOIN SODIUM SCH MLS/HR: 50 INJECTION INTRAMUSCULAR; INTRAVENOUS at 03:28

## 2017-05-14 RX ADMIN — AMPICILLIN SODIUM SCH MLS/HR: 2 INJECTION, POWDER, FOR SOLUTION INTRAMUSCULAR; INTRAVENOUS at 05:00

## 2017-05-14 RX ADMIN — SODIUM CHLORIDE TAB 1 GM SCH GM: 1 TAB at 17:18

## 2017-05-14 RX ADMIN — METOPROLOL SUCCINATE SCH MG: 50 TABLET, FILM COATED, EXTENDED RELEASE ORAL at 09:03

## 2017-05-14 RX ADMIN — CEFTRIAXONE SODIUM SCH MLS/HR: 2 INJECTION, POWDER, FOR SOLUTION INTRAMUSCULAR; INTRAVENOUS at 10:05

## 2017-05-14 RX ADMIN — HEPARIN SODIUM SCH UNITS: 10000 INJECTION, SOLUTION INTRAVENOUS; SUBCUTANEOUS at 22:28

## 2017-05-14 RX ADMIN — PHENYTOIN SODIUM SCH MLS/HR: 50 INJECTION INTRAMUSCULAR; INTRAVENOUS at 02:59

## 2017-05-14 RX ADMIN — SODIUM CHLORIDE SCH MLS/HR: 900 INJECTION INTRAVENOUS at 22:30

## 2017-05-14 RX ADMIN — FAMOTIDINE SCH MG: 20 TABLET, FILM COATED ORAL at 22:28

## 2017-05-14 RX ADMIN — CHLORHEXIDINE GLUCONATE SCH PACK: 500 CLOTH TOPICAL at 04:00

## 2017-05-14 RX ADMIN — AMPICILLIN SODIUM SCH MLS/HR: 2 INJECTION, POWDER, FOR SOLUTION INTRAMUSCULAR; INTRAVENOUS at 08:57

## 2017-05-14 RX ADMIN — ACYCLOVIR SODIUM SCH MLS/HR: 50 INJECTION, SOLUTION INTRAVENOUS at 05:00

## 2017-05-14 RX ADMIN — ATORVASTATIN CALCIUM SCH MG: 80 TABLET, FILM COATED ORAL at 22:27

## 2017-05-14 RX ADMIN — SODIUM CHLORIDE TAB 1 GM SCH GM: 1 TAB at 12:57

## 2017-05-14 RX ADMIN — SODIUM CHLORIDE TAB 1 GM SCH GM: 1 TAB at 08:44

## 2017-05-14 RX ADMIN — Medication SCH ML: at 22:30

## 2017-05-14 RX ADMIN — PHENYTOIN SODIUM SCH MLS/HR: 50 INJECTION INTRAMUSCULAR; INTRAVENOUS at 12:57

## 2017-05-14 NOTE — MB
cc:

SINCERE IQBAL MD

****

 

 

DATE OF CONSULTATION:  05/14/2017.

 

REASON FOR CONSULTATION:

Sepsis, source unknown.

 

REQUESTING PHYSICIAN:

Dr. Nichole.

 

HISTORY OF PRESENT ILLNESS:

This is a 72-year-old black male who was brought to the emergency department by

his daughter with altered mental status.  The patient is currently awake.  He

is a poor historian.  He states that he feels better and wants to go home.  The

patient reports that he was having chills and he asked for blankets at home and

he wanted something warm to drink but instead his daughter brought him to the

emergency department for evaluation.

 

Because of his mental status changes, a lumbar puncture was performed in the

emergency department.  He was found to have elevated temperature and elevated

heart rate with temperature of 103.13 and heart rate of 103 and lactic acid

level was 6.8.

 

Lumbar puncture that revealed 11 white cells with 64% neutrophils and 32%

lymphocytes and a total protein of 45.6 and normal glucose. The CSF was noted

to be grossly bloody and the red blood cell count was 1607.  The patient denies

headache.  Blood cultures were obtained and one of four bottles has

gram-negative sherri.

 

The cerebrospinal fluid culture is negative in 24 hours and the cerebrospinal

fluid Gram stain showed no white cells and no organisms.

 

The patient is currently on broad-spectrum antibiotics.

 

A CT scan of his head showed severe diffuse periventricular and subcortical

white matter small-vessel ischemic changes bilaterally. Diffuse cerebral

atrophy was also noted.  No acute infarct or hemorrhage was noted.

 

The patient appears very alert and very awake but is very confused.  He had

cataract surgery and is blind.  He tells me that he never pays attention to the

date and month since he got sick in December. At that time in December, he was

admitted with severe emphysema and COPD exacerbation and was intubated.

 

PAST MEDICAL HISTORY:

1. Asthma.

2. Hypertension.

3. COPD.

4. Glaucoma.

5. Cataract surgery.

6. Diabetes mellitus.

 

ALLERGIES:

CONTRAST MEDIA.

 

NO KNOWN DRUG ALLERGIES.

 

MEDICATIONS:

1. Ampicillin.

2. Vancomycin.

3. Ceftriaxone.

4. Acyclovir.

5. Lipitor.

6. Pepcid.

7. Toprol XL.

8. Aspirin.

9. Norvasc.

 

SOCIAL HISTORY:

The patient lives with his daughter. He states that he had quit smoking

cigarettes a long time ago but recently bought a pack of cigarettes and smoked

two cigarettes within the past week.  No alcohol use.  No illicit drug use.

 

FAMILY HISTORY:

Noncontributory.

 

REVIEW OF SYSTEMS:

CONSTITUTIONAL: Positive for chills.

HEAD, EYES, EARS, NOSE, THROAT:  The patient is blind. Denies difficulty

swallowing or soreness of the throat.

NECK: No neck swelling.

RESPIRATORY: No cough or shortness of breath.

CARDIOVASCULAR: No palpitations or chest pain.

GASTROINTESTINAL: No nausea or vomiting, abdominal pain or diarrhea.

GENITOURINARY: No urgency or dysuria.

HEMATOPOIETIC: No easy bruising. No bleeding.

ENDOCRINE: No polyuria or polydipsia.

MUSCULOSKELETAL:  No muscle aches or joint pains.

INTEGUMENTARY: No skin rash or itching.

NEUROLOGIC: No headache or dizziness.

 

PHYSICAL EXAMINATION:

GENERAL: He is a slender, elderly male who appears well-nourished.  He is in no

acute distress.  He is awake and alert.  He is only oriented to person.

VITAL SIGNS:  Temperature 99 degrees, blood pressure 109/58, respirations 18,

heart rate 58. The patient is on room air oxygen.

HEAD, EYES, EARS, NOSE, THROAT: The head is atraumatic. Extraocular movements

grossly intact. Pupils are reactive. Arcus senilis present in both eyes.

Oropharynx with no visible lesions.

NECK: The neck is supple. No adenopathy.

LUNGS: Clear breath sounds bilaterally.

HEART: Regular S1-S2 without murmurs, rubs or gallops.

ABDOMEN: Bowel sounds present, soft, no tenderness. No masses.

RECTAL: Not performed.

EXTREMITIES: No clubbing or cyanosis or edema.

NEUROLOGIC: Nonfocal.

 

LABORATORY DATA:

WBCs 15.5, platelets 138,000, hemoglobin 10.5, 80% neutrophils.

 

Creatinine 0.79, estimated , lactic acid 1.0.

 

Liver function tests within normal limits.

 

Toxicology screen negative.

 

IMAGING STUDIES:

Chest x-ray showed streaky densities within the right perihilar region and the

left base consistent with atelectasis and/or mild infiltrate.

 

IMPRESSION:

1. Gram-negative sepsis.  Patient with one blood culture bottle showing gram

negative rods.  He presented with sepsis including fever, tachycardia, elevated

lactic acid and now with elevated white blood cell count.  The source is

unclear at this time.

 

2. Altered mental status, probably related to sepsis.  Cerebrospinal fluid

studies not reflective of meningitis.

 

 

 

 

 

RECOMMENDATIONS:

1. Continue ceftriaxone.

2. Discontinue acyclovir.

3. Discontinue ampicillin.

4. Discontinue vancomycin.

5. Monitor identity and sensitivity of the gram-negative bacteria in the blood

stream.

6. Follow the repeat blood cultures.

7. Monitor clinically for source of infection.

 

The patient appears to be clinically stable.  However, with gram-negative

bacteria in the blood stream, he has the potential to deteriorate.

 

I have explained to the patient the seriousness of his condition.

 

Thank you for this consultation.  The patient's progress will be monitored and

further recommendations will be given on followup if necessary.

 

 

 

                              _________________________________

                              Sincere Iqbal MD FD/ASHER

D:  5/14/2017/11:09 AM

T:  5/14/2017/1:04 PM

Visit #:  [id number]

Job #:  [id number]

## 2017-05-14 NOTE — HHI.PR
Subjective


Remarks


Pt tells me that he feels fine after eating breakfast. Denies any pain anywhere

, tells me that he is fine and doesn't need to be here. He states that the lady 

taking care of him took him to the Dr because he had chills from not eating all 

day. denies any CP/SOB/N/V


discussed w RN, pt's daughter was here earlier and she felt that pt was better 

compared to yesterday





Objective


Vitals





 Vital Signs








  Date Time  Temp Pulse Resp B/P Pulse Ox O2 Delivery O2 Flow Rate FiO2


 


5/14/17 06:00  56      


 


5/14/17 06:00 99.0 58 18 109/58 96   


 


5/14/17 04:00  58      


 


5/14/17 02:00  58      


 


5/14/17 00:00 98.8 61 24 132/60 95   


 


5/14/17 00:00  61      


 


5/13/17 22:00 98.5 61 16 102/57 96   


 


5/13/17 22:00  60      


 


5/13/17 19:41 97.9 80 16 135/79 95   


 


5/13/17 17:30 103.3 103 16 141/84 97 Nasal Cannula 2 


 


5/13/17 17:30     98 Nasal Cannula 2 


 


5/13/17 17:30    138/82    


 


5/13/17 16:40   16  96 Room Air  


 


5/13/17 16:37  103 16 142/73 96   


 


5/13/17 16:28 100.1 113 24 187/94 95   








 I/O








 5/13/17 5/13/17 5/13/17 5/14/17 5/14/17 5/14/17





 07:00 15:00 23:00 07:00 15:00 23:00


 


Intake Total    2450 ml  


 


Output Total    700 ml  


 


Balance    1750 ml  


 


      


 


Intake IV Total    2450 ml  


 


Output Urine Total    700 ml  








Result Diagram:  


5/14/17 0437                                                                   

             5/14/17 0437





Imaging





Last Impressions








Chest X-Ray 5/13/17 1654 Signed





Impressions: 





 Service Date/Time:  Saturday, May 13, 2017 17:08 - CONCLUSION:  Streaky 





 densities within the right perihilar region and left lung base consistent with 





 atelectasis and/or mild infiltrates.  Clinical correlation is recommended.    





 Adam Zambrano MD 


 


Head CT 5/13/17 0000 Signed





Impressions: 





 Service Date/Time:  Saturday, May 13, 2017 21:33 - CONCLUSION:  1.  Severe 





 diffuse periventricular and subcortical white matter small vessel ischemic 





 changes bilaterally. 2.  Diffuse cerebral atrophy. 3.  No acute infarct, acute 





 hemorrhage, mass effect or extra-axial fluid collections.     Adam Zambrano MD 








Objective Remarks


GENERAL: Cachectic elderly man, alert and awake, somewhat confused but answers 

most questions appropriately


CARDIOVASCULAR: Regular rate and rhythm without murmurs 


RESPIRATORY: Breath sounds equal bilaterally. No accessory muscle use.


GASTROINTESTINAL: Abdomen soft, non-tender, nondistended. 


MUSCULOSKELETAL: No cyanosis, or edema. 


EXTREMITIES: No edema





A/P


Assessment and Plan


Altered mental status


- CT head negative


- History of fever and headache however pt denies this at this time. 


- LP shows WBC 11, RBC 1607 and protein 45.6. Pt currently on Vanc, acyclovir, 

rocephin and ampicillin IV. ID consulted. Appreciate input. continue IV abx for 

now


- cultures growing gram neg sherri and CSF neg x 24 hrs





Lactic acidosis


- Unclear source of infection. blood cx pos gram neg sherri x 1


- repeat blood cx now  





COPD


- No exacerbation


- No steroids indicated


- DuoNeb's when necessary





TELE personally reviewed by me and shows mild bj then sinus rhythm. Place 

hold parameters on BB





DVT GI prophylaxis


- Subcutaneous heparin


- Pepcid


Discharge Planning


d/c pending further work-up and clinical improvement. 


transfer to regular floor








Dora Ramirez MD May 14, 2017 09:35

## 2017-05-14 NOTE — EKG
Date Performed: 05/13/2017       Time Performed: 16:43:57

 

PTAGE:      72 years

 

EKG:      SINUS TACHYCARDIA SEPTAL MYOCARDIAL INFARCTION ST DEVIATION AND MODERATE T-WAVE ABNORMALITY
, CONSIDER ANTEROLATERAL ISCHEMIA When compared to previous tracing, the sinus tachycardia is new. Th
e marked lateral ST segment changes are new, and acute Myocardial ischemia should be excluded clinica
lly. ABNORMAL ECG

 

PREVIOUS TRACING       : 03/30/2017 11.06

 

DOCTOR:   Renata Dasilva  Interpretating Date/Time  05/14/2017 16:02:38

## 2017-05-15 VITALS
RESPIRATION RATE: 17 BRPM | TEMPERATURE: 96 F | OXYGEN SATURATION: 97 % | DIASTOLIC BLOOD PRESSURE: 76 MMHG | SYSTOLIC BLOOD PRESSURE: 164 MMHG | HEART RATE: 62 BPM

## 2017-05-15 VITALS
HEART RATE: 73 BPM | RESPIRATION RATE: 20 BRPM | SYSTOLIC BLOOD PRESSURE: 113 MMHG | TEMPERATURE: 96.6 F | OXYGEN SATURATION: 94 % | DIASTOLIC BLOOD PRESSURE: 60 MMHG

## 2017-05-15 VITALS
DIASTOLIC BLOOD PRESSURE: 70 MMHG | TEMPERATURE: 96.4 F | OXYGEN SATURATION: 96 % | SYSTOLIC BLOOD PRESSURE: 150 MMHG | HEART RATE: 76 BPM | RESPIRATION RATE: 20 BRPM

## 2017-05-15 VITALS
RESPIRATION RATE: 17 BRPM | OXYGEN SATURATION: 96 % | TEMPERATURE: 96.7 F | SYSTOLIC BLOOD PRESSURE: 138 MMHG | DIASTOLIC BLOOD PRESSURE: 71 MMHG | HEART RATE: 62 BPM

## 2017-05-15 VITALS
TEMPERATURE: 97 F | HEART RATE: 63 BPM | RESPIRATION RATE: 20 BRPM | DIASTOLIC BLOOD PRESSURE: 58 MMHG | OXYGEN SATURATION: 95 % | SYSTOLIC BLOOD PRESSURE: 105 MMHG

## 2017-05-15 VITALS
RESPIRATION RATE: 17 BRPM | SYSTOLIC BLOOD PRESSURE: 151 MMHG | OXYGEN SATURATION: 96 % | DIASTOLIC BLOOD PRESSURE: 68 MMHG | TEMPERATURE: 96.7 F | HEART RATE: 69 BPM

## 2017-05-15 VITALS — HEART RATE: 74 BPM

## 2017-05-15 LAB
ANION GAP SERPL CALC-SCNC: 6 MEQ/L (ref 5–15)
BASOPHILS # BLD AUTO: 0.1 TH/MM3 (ref 0–0.2)
BASOPHILS NFR BLD: 0.5 % (ref 0–2)
BUN SERPL-MCNC: 7 MG/DL (ref 7–18)
CHLORIDE SERPL-SCNC: 110 MEQ/L (ref 98–107)
EOSINOPHIL # BLD: 0.6 TH/MM3 (ref 0–0.4)
EOSINOPHIL NFR BLD: 4.8 % (ref 0–4)
ERYTHROCYTE [DISTWIDTH] IN BLOOD BY AUTOMATED COUNT: 16.3 % (ref 11.6–17.2)
GFR SERPLBLD BASED ON 1.73 SQ M-ARVRAT: 146 ML/MIN (ref 89–?)
HCO3 BLD-SCNC: 26.5 MEQ/L (ref 21–32)
HCT VFR BLD CALC: 36.7 % (ref 39–51)
HEMO FLAGS: (no result)
LYMPHOCYTES # BLD AUTO: 1.7 TH/MM3 (ref 1–4.8)
LYMPHOCYTES NFR BLD AUTO: 14 % (ref 9–44)
MCH RBC QN AUTO: 26.2 PG (ref 27–34)
MCHC RBC AUTO-ENTMCNC: 32 % (ref 32–36)
MCV RBC AUTO: 81.7 FL (ref 80–100)
MONOCYTES NFR BLD: 6.5 % (ref 0–8)
NEUTROPHILS # BLD AUTO: 9.1 TH/MM3 (ref 1.8–7.7)
NEUTROPHILS NFR BLD AUTO: 74.2 % (ref 16–70)
PLATELET # BLD: 139 TH/MM3 (ref 150–450)
POTASSIUM SERPL-SCNC: 3.5 MEQ/L (ref 3.5–5.1)
RBC # BLD AUTO: 4.5 MIL/MM3 (ref 4.5–5.9)
SODIUM SERPL-SCNC: 142 MEQ/L (ref 136–145)
WBC # BLD AUTO: 12.3 TH/MM3 (ref 4–11)

## 2017-05-15 RX ADMIN — SODIUM CHLORIDE SCH MLS/HR: 900 INJECTION INTRAVENOUS at 05:20

## 2017-05-15 RX ADMIN — SODIUM CHLORIDE TAB 1 GM SCH GM: 1 TAB at 10:41

## 2017-05-15 RX ADMIN — Medication SCH ML: at 09:00

## 2017-05-15 RX ADMIN — MUPIROCIN CALCIUM SCH APPLIC: 20 OINTMENT TOPICAL at 09:00

## 2017-05-15 RX ADMIN — HEPARIN SODIUM SCH UNITS: 10000 INJECTION, SOLUTION INTRAVENOUS; SUBCUTANEOUS at 10:40

## 2017-05-15 RX ADMIN — SODIUM CHLORIDE SCH MLS/HR: 900 INJECTION INTRAVENOUS at 14:09

## 2017-05-15 RX ADMIN — FAMOTIDINE SCH MG: 20 TABLET, FILM COATED ORAL at 10:41

## 2017-05-15 RX ADMIN — HEPARIN SODIUM SCH UNITS: 10000 INJECTION, SOLUTION INTRAVENOUS; SUBCUTANEOUS at 21:07

## 2017-05-15 RX ADMIN — PHENYTOIN SODIUM SCH MLS/HR: 50 INJECTION INTRAMUSCULAR; INTRAVENOUS at 03:28

## 2017-05-15 RX ADMIN — FAMOTIDINE SCH MG: 20 TABLET, FILM COATED ORAL at 21:06

## 2017-05-15 RX ADMIN — SODIUM CHLORIDE SCH MLS/HR: 900 INJECTION INTRAVENOUS at 21:08

## 2017-05-15 RX ADMIN — CHLORHEXIDINE GLUCONATE SCH PACK: 500 CLOTH TOPICAL at 03:35

## 2017-05-15 RX ADMIN — PHENYTOIN SODIUM SCH MLS/HR: 50 INJECTION INTRAMUSCULAR; INTRAVENOUS at 21:08

## 2017-05-15 RX ADMIN — MUPIROCIN CALCIUM SCH APPLIC: 20 OINTMENT TOPICAL at 21:07

## 2017-05-15 RX ADMIN — Medication SCH ML: at 21:10

## 2017-05-15 RX ADMIN — SODIUM CHLORIDE TAB 1 GM SCH GM: 1 TAB at 13:00

## 2017-05-15 RX ADMIN — ASPIRIN SCH MG: 81 TABLET ORAL at 10:41

## 2017-05-15 RX ADMIN — SODIUM CHLORIDE TAB 1 GM SCH GM: 1 TAB at 17:58

## 2017-05-15 RX ADMIN — ATORVASTATIN CALCIUM SCH MG: 80 TABLET, FILM COATED ORAL at 21:06

## 2017-05-15 RX ADMIN — METOPROLOL SUCCINATE SCH MG: 50 TABLET, FILM COATED, EXTENDED RELEASE ORAL at 10:42

## 2017-05-15 NOTE — HHI.PR
Subjective


Remarks


pt thinks he is home, denies any pain, when told he is in the hospital he says 

no. denies any CP/SOB/n/v





Objective


Vitals





 Vital Signs








  Date Time  Temp Pulse Resp B/P Pulse Ox O2 Delivery O2 Flow Rate FiO2


 


5/15/17 11:45 96.7 69 17 151/68 96   


 


5/15/17 07:58 96.0 62 17 164/76 97   


 


5/15/17 04:00 97.0 63 20 105/58 95   


 


5/15/17 00:41      Room Air  


 


5/15/17 00:00 96.6 73 20 113/60 94   


 


5/14/17 20:00 96.0 63 20 112/58 95   


 


5/14/17 16:00      Room Air  


 


5/14/17 16:00 97.4 61 20 138/68 94   








 I/O








 5/14/17 5/14/17 5/14/17 5/15/17 5/15/17 5/15/17





 06:59 14:59 22:59 06:59 14:59 22:59


 


Intake Total 2450 ml 1651 ml 480 ml 966 ml  


 


Output Total 700 ml 1525 ml    


 


Balance 1750 ml 126 ml 480 ml 966 ml  


 


      


 


Intake Oral  590 ml 480 ml 120 ml  


 


IV Total 2450 ml 1061 ml  846 ml  


 


Output Urine Total 700 ml 1525 ml    


 


# Voids   1 3  


 


# Bowel Movements   0 0  








Result Diagram:  


5/15/17 0611                                                                   

             5/15/17 0611





Imaging





Last Impressions








Chest X-Ray 5/13/17 1654 Signed





Impressions: 





 Service Date/Time:  Saturday, May 13, 2017 17:08 - CONCLUSION:  Streaky 





 densities within the right perihilar region and left lung base consistent with 





 atelectasis and/or mild infiltrates.  Clinical correlation is recommended.    





 Adam Zambrano MD 


 


Head CT 5/13/17 0000 Signed





Impressions: 





 Service Date/Time:  Saturday, May 13, 2017 21:33 - CONCLUSION:  1.  Severe 





 diffuse periventricular and subcortical white matter small vessel ischemic 





 changes bilaterally. 2.  Diffuse cerebral atrophy. 3.  No acute infarct, acute 





 hemorrhage, mass effect or extra-axial fluid collections.     Adam Zambrano MD 








Objective Remarks


GENERAL: Cachectic elderly man, alert and awake, pleasantly confused


CARDIOVASCULAR: Regular rate and rhythm without murmurs 


RESPIRATORY: Breath sounds equal bilaterally. No accessory muscle use.


GASTROINTESTINAL: Abdomen soft, non-tender, nondistended. 


MUSCULOSKELETAL: No cyanosis, or edema. 


EXTREMITIES: No edema





A/P


Assessment and Plan


Altered mental status


- CT head negative


- History of fever and headache however pt denies this at this time. 


- LP shows WBC 11, RBC 1607 and protein 45.6.  ID following and has 

discontinued vanc/amp/acyclovir. continue rocephin IV. appreciate input. 


- cultures growing gram neg sherri and repeat blood cx negx1 day. and CSF neg x 48 

hrs





Lactic acidosis


- Unclear source of infection. blood cx pos gram neg sherri x 1


- repeat blood cx neg x1day





COPD


- No exacerbation


- No steroids indicated


- DuoNeb's when necessary





 





DVT GI prophylaxis


- Subcutaneous heparin


- Pepcid


Discharge Planning


d/c pending further work-up and clinical improvement. 


awaiting final recs from Dora Moran MD May 15, 2017 15:12

## 2017-05-15 NOTE — HHI.IDPN
Note


Infectious Disease Note

















Patient noted to be very confused.


Laying in bed in no acute distress.


Keeps eyes closed.


Patient is blind.


Afebrile.


CSF culture has no growth.


Patient was brought to the Ed by his daughter with altered mental status. 


 


PAST MEDICAL HISTORY:


1. Asthma.


2. Hypertension.


3. COPD.


4. Glaucoma.


5. Cataract surgery.


6. Diabetes mellitus.


 


ALLERGIES:


CONTRAST MEDIA.


 


NO KNOWN DRUG ALLERGIES.


 


ANTIBIOTICS:


Meropenem.


 


OBJECTIVE:





 Vital Signs








  Date Time  Temp Pulse Resp B/P Pulse Ox O2 Delivery O2 Flow Rate FiO2


 


5/15/17 11:45 96.7 69 17 151/68 96   


 


5/15/17 07:58 96.0 62 17 164/76 97   


 


5/15/17 04:00 97.0 63 20 105/58 95   


 


5/15/17 00:41      Room Air  


 


5/15/17 00:00 96.6 73 20 113/60 94   


 


5/14/17 20:00 96.0 63 20 112/58 95   


 


5/14/17 16:00      Room Air  


 


5/14/17 16:00 97.4 61 20 138/68 94   














 5/14/17 5/14/17 5/15/17





 15:00 23:00 07:00


 


Intake Total 1651 ml 480 ml 966 ml


 


Output Total 1525 ml  


 


Balance 126 ml 480 ml 966 ml


 


   


 


Intake Oral 590 ml 480 ml 120 ml


 


IV Total 1061 ml  846 ml


 


Output Urine Total 1525 ml  


 


# Voids  1 3


 


# Bowel Movements  0 0











Laboratory Tests








Test 5/13/17 5/14/17 5/15/17





 16:50 04:37 06:11


 


White Blood Count 6.4 TH/MM3 15.5 TH/MM3 12.3 TH/MM3


 


Red Blood Count 4.71 MIL/MM3 4.03 MIL/MM3 4.50 MIL/MM3


 


Hemoglobin 12.6 GM/DL 10.5 GM/DL 11.8 GM/DL


 


Hematocrit 39.0 % 33.0 % 36.7 %


 


Mean Corpuscular Volume 82.9 FL 81.9 FL 81.7 FL


 


Mean Corpuscular Hemoglobin 26.9 PG 26.1 PG 26.2 PG


 


Mean Corpuscular Hemoglobin 32.4 % 31.8 % 32.0 %





Concent   


 


Red Cell Distribution Width 16.4 % 15.8 % 16.3 %


 


Platelet Count 186 TH/MM3 138 TH/MM3 139 TH/MM3


 


Mean Platelet Volume 8.2 FL 7.7 FL 8.3 FL


 


Neutrophils (%) (Auto) 70.0 % 80.6 % 74.2 %


 


Lymphocytes (%) (Auto) 25.3 % 11.8 % 14.0 %


 


Monocytes (%) (Auto) 0.5 % 4.3 % 6.5 %


 


Eosinophils (%) (Auto) 3.5 % 2.7 % 4.8 %


 


Basophils (%) (Auto) 0.7 % 0.6 % 0.5 %


 


Neutrophils # (Auto) 4.5 TH/MM3 12.5 TH/MM3 9.1 TH/MM3


 


Lymphocytes # (Auto) 1.6 TH/MM3 1.8 TH/MM3 1.7 TH/MM3


 


Monocytes # (Auto) 0.0 TH/MM3 0.7 TH/MM3 0.8 TH/MM3


 


Eosinophils # (Auto) 0.2 TH/MM3 0.4 TH/MM3 0.6 TH/MM3


 


Basophils # (Auto) 0.0 TH/MM3 0.1 TH/MM3 0.1 TH/MM3


 


CBC Comment DIFF FINAL  DIFF FINAL  DIFF FINAL 


 


Differential Comment      








Laboratory Tests








Test 5/13/17 5/13/17 5/14/17 5/15/17





 16:50 20:05 04:37 06:11


 


Sodium Level 139 MEQ/L  146 MEQ/L 142 MEQ/L


 


Potassium Level 3.6 MEQ/L  4.0 MEQ/L 3.5 MEQ/L


 


Chloride Level 103 MEQ/L  115 MEQ/L 110 MEQ/L


 


Carbon Dioxide Level 24.7 MEQ/L  24.7 MEQ/L 26.5 MEQ/L


 


Anion Gap 11 MEQ/L  6 MEQ/L 6 MEQ/L


 


Blood Urea Nitrogen 5 MG/DL  7 MG/DL 7 MG/DL


 


Creatinine 0.99 MG/DL  0.79 MG/DL 0.65 MG/DL


 


Estimat Glomerular Filtration 90 ML/MIN  117 ML/ ML/MIN





Rate    


 


Random Glucose 100 MG/DL  81 MG/DL 69 MG/DL


 


Lactic Acid Level 6.8 mmol/L 3.9 mmol/L 1.0 mmol/L 


 


Calcium Level 8.8 MG/DL  7.5 MG/DL 9.1 MG/DL


 


Magnesium Level 1.5 MG/DL   


 


Total Bilirubin 0.4 MG/DL  0.3 MG/DL 


 


Aspartate Amino Transf 26 U/L  36 U/L 





(AST/SGOT)    


 


Alanine Aminotransferase 28 U/L  22 U/L 





(ALT/SGPT)    


 


Alkaline Phosphatase 182 U/L  132 U/L 


 


Total Creatine Kinase 87 U/L   


 


Troponin I LESS THAN 0.02   





 NG/ML   


 


Total Protein 7.5 GM/DL  5.6 GM/DL 


 


Albumin 3.4 GM/DL  2.4 GM/DL 








Microbiology








 Date/Time Procedure Status





Source Growth 


 


 5/13/17 16:50 Aerobic Blood Culture - Preliminary Resulted





Blood Peripheral NO GROWTH IN 2 DAYS 





 5/13/17 16:50 Anaerobic Blood Culture - Preliminary Resulted





 Escherichia Coli 


 


 5/13/17 17:20 Aerobic Blood Culture - Preliminary Resulted





Blood Peripheral NO GROWTH IN 2 DAYS 





 5/13/17 17:20 Anaerobic Blood Culture - Preliminary Resulted





Blood Peripheral NO GROWTH IN 2 DAYS 


 


 5/13/17 22:45 Gram Stain - Final Resulted





Cerebral Spinal Fluid Lumbar Puncture  





 5/13/17 22:45 CSF Culture - Preliminary Resulted





Cerebral Spinal Fluid Lumbar Puncture NO GROWTH IN 48 HOURS. 


 


 5/14/17 10:40 Aerobic Blood Culture - Preliminary Resulted





Blood Peripheral NO GROWTH IN 1 DAY 





 5/14/17 10:40 Anaerobic Blood Culture - Preliminary Resulted





Blood Peripheral NO GROWTH IN 1 DAY 


 


 5/14/17 10:44 Aerobic Blood Culture - Preliminary Resulted





Blood Peripheral NO GROWTH IN 1 DAY 





 5/14/17 10:44 Anaerobic Blood Culture - Preliminary Resulted





Blood Peripheral NO GROWTH IN 1 DAY 











IMAGING:














Chest X-Ray 5/13/17 1654 Signed





Impressions: 





 Service Date/Time:  Saturday, May 13, 2017 17:08 - CONCLUSION:  Streaky 





 densities within the right perihilar region and left lung base consistent with 





 atelectasis and/or mild infiltrates.  Clinical correlation is recommended.    





 Adam Zambrano MD 


 


Head CT 5/13/17 0000 Signed





Impressions: 





 Service Date/Time:  Saturday, May 13, 2017 21:33 - CONCLUSION:  1.  Severe 





 diffuse periventricular and subcortical white matter small vessel ischemic 





 changes bilaterally. 2.  Diffuse cerebral atrophy. 3.  No acute infarct, acute 





 hemorrhage, mass effect or extra-axial fluid collections.     Adam Zambrano MD 








PHYSICAL EXAMINATION:


GENERAL: No acute distress.He is only oriented to person.


HEAD, EYES, EARS, NOSE, THROAT: The head is atraumatic. Extraocular movements


grossly intact. Pupils are reactive. Arcus senilis present in both eyes.


Oropharynx with no visible lesions.


NECK: The neck is supple. No adenopathy.


LUNGS: Clear breath sounds.


HEART: Regular S1-S2 without murmurs, rubs or gallops.


ABDOMEN: Bowel sounds present, soft, no tenderness. No masses.


EXTREMITIES: No clubbing or cyanosis or edema.


NEUROLOGIC: Nonfocal.


 





 


IMPRESSION:


1. Gram-negative sepsis.  Patient with one blood culture bottle showing gram


negative rods.  He presented with sepsis including fever, tachycardia, elevated


lactic acid and now with elevated white blood cell count.  


2. Altered mental status, probably related to sepsis.  Cerebrospinal fluid


studies not reflective of meningitis.


 


 


 


 


 


RECOMMENDATIONS:


1. Continue Meropenem


2. Monitor identity and sensitivity of the gram-negative bacteria in the blood


stream.


3. Follow the repeat blood cultures.


4. Monitor clinically.








Sincere Preciado MD May 15, 2017 15:06

## 2017-05-16 VITALS
SYSTOLIC BLOOD PRESSURE: 131 MMHG | HEART RATE: 69 BPM | TEMPERATURE: 95.6 F | RESPIRATION RATE: 18 BRPM | DIASTOLIC BLOOD PRESSURE: 72 MMHG | OXYGEN SATURATION: 96 %

## 2017-05-16 VITALS
HEART RATE: 51 BPM | SYSTOLIC BLOOD PRESSURE: 139 MMHG | RESPIRATION RATE: 17 BRPM | OXYGEN SATURATION: 94 % | TEMPERATURE: 97.5 F | DIASTOLIC BLOOD PRESSURE: 66 MMHG

## 2017-05-16 VITALS
SYSTOLIC BLOOD PRESSURE: 144 MMHG | DIASTOLIC BLOOD PRESSURE: 76 MMHG | HEART RATE: 72 BPM | TEMPERATURE: 96.2 F | OXYGEN SATURATION: 99 % | RESPIRATION RATE: 18 BRPM

## 2017-05-16 VITALS
DIASTOLIC BLOOD PRESSURE: 63 MMHG | HEART RATE: 60 BPM | RESPIRATION RATE: 20 BRPM | TEMPERATURE: 97.6 F | SYSTOLIC BLOOD PRESSURE: 132 MMHG | OXYGEN SATURATION: 96 %

## 2017-05-16 VITALS — HEART RATE: 50 BPM

## 2017-05-16 VITALS
SYSTOLIC BLOOD PRESSURE: 139 MMHG | OXYGEN SATURATION: 96 % | TEMPERATURE: 96.7 F | DIASTOLIC BLOOD PRESSURE: 69 MMHG | HEART RATE: 62 BPM | RESPIRATION RATE: 18 BRPM

## 2017-05-16 VITALS
RESPIRATION RATE: 20 BRPM | DIASTOLIC BLOOD PRESSURE: 65 MMHG | SYSTOLIC BLOOD PRESSURE: 114 MMHG | OXYGEN SATURATION: 91 % | TEMPERATURE: 97.5 F | HEART RATE: 60 BPM

## 2017-05-16 LAB
ANION GAP SERPL CALC-SCNC: 7 MEQ/L (ref 5–15)
BASOPHILS # BLD AUTO: 0.1 TH/MM3 (ref 0–0.2)
BASOPHILS NFR BLD: 1.5 % (ref 0–2)
BUN SERPL-MCNC: 6 MG/DL (ref 7–18)
CHLORIDE SERPL-SCNC: 106 MEQ/L (ref 98–107)
EOSINOPHIL # BLD: 0.8 TH/MM3 (ref 0–0.4)
EOSINOPHIL NFR BLD: 8 % (ref 0–4)
ERYTHROCYTE [DISTWIDTH] IN BLOOD BY AUTOMATED COUNT: 16.5 % (ref 11.6–17.2)
GFR SERPLBLD BASED ON 1.73 SQ M-ARVRAT: 143 ML/MIN (ref 89–?)
HCO3 BLD-SCNC: 26.6 MEQ/L (ref 21–32)
HCT VFR BLD CALC: 36.8 % (ref 39–51)
HEMO FLAGS: (no result)
LYMPHOCYTES # BLD AUTO: 2.4 TH/MM3 (ref 1–4.8)
LYMPHOCYTES NFR BLD AUTO: 25.5 % (ref 9–44)
MCH RBC QN AUTO: 27.2 PG (ref 27–34)
MCHC RBC AUTO-ENTMCNC: 33.8 % (ref 32–36)
MCV RBC AUTO: 80.5 FL (ref 80–100)
MONOCYTES NFR BLD: 10.3 % (ref 0–8)
NEUTROPHILS # BLD AUTO: 5.2 TH/MM3 (ref 1.8–7.7)
NEUTROPHILS NFR BLD AUTO: 54.7 % (ref 16–70)
PLATELET # BLD: 135 TH/MM3 (ref 150–450)
POTASSIUM SERPL-SCNC: 3.8 MEQ/L (ref 3.5–5.1)
RBC # BLD AUTO: 4.57 MIL/MM3 (ref 4.5–5.9)
SODIUM SERPL-SCNC: 140 MEQ/L (ref 136–145)
WBC # BLD AUTO: 9.4 TH/MM3 (ref 4–11)

## 2017-05-16 RX ADMIN — HEPARIN SODIUM SCH UNITS: 10000 INJECTION, SOLUTION INTRAVENOUS; SUBCUTANEOUS at 08:50

## 2017-05-16 RX ADMIN — FAMOTIDINE SCH MG: 20 TABLET, FILM COATED ORAL at 22:16

## 2017-05-16 RX ADMIN — HEPARIN SODIUM SCH UNITS: 10000 INJECTION, SOLUTION INTRAVENOUS; SUBCUTANEOUS at 22:16

## 2017-05-16 RX ADMIN — MUPIROCIN CALCIUM SCH APPLIC: 20 OINTMENT TOPICAL at 22:15

## 2017-05-16 RX ADMIN — PHENYTOIN SODIUM SCH MLS/HR: 50 INJECTION INTRAMUSCULAR; INTRAVENOUS at 11:28

## 2017-05-16 RX ADMIN — SODIUM CHLORIDE TAB 1 GM SCH GM: 1 TAB at 18:43

## 2017-05-16 RX ADMIN — Medication SCH ML: at 08:56

## 2017-05-16 RX ADMIN — SODIUM CHLORIDE SCH MLS/HR: 900 INJECTION INTRAVENOUS at 22:15

## 2017-05-16 RX ADMIN — CHLORHEXIDINE GLUCONATE SCH PACK: 500 CLOTH TOPICAL at 04:00

## 2017-05-16 RX ADMIN — SODIUM CHLORIDE TAB 1 GM SCH GM: 1 TAB at 13:00

## 2017-05-16 RX ADMIN — MUPIROCIN CALCIUM SCH APPLIC: 20 OINTMENT TOPICAL at 08:49

## 2017-05-16 RX ADMIN — METOPROLOL SUCCINATE SCH MG: 50 TABLET, FILM COATED, EXTENDED RELEASE ORAL at 08:54

## 2017-05-16 RX ADMIN — ASPIRIN SCH MG: 81 TABLET ORAL at 08:55

## 2017-05-16 RX ADMIN — FAMOTIDINE SCH MG: 20 TABLET, FILM COATED ORAL at 08:55

## 2017-05-16 RX ADMIN — SODIUM CHLORIDE TAB 1 GM SCH GM: 1 TAB at 08:56

## 2017-05-16 RX ADMIN — ATORVASTATIN CALCIUM SCH MG: 80 TABLET, FILM COATED ORAL at 22:15

## 2017-05-16 RX ADMIN — PHENYTOIN SODIUM SCH MLS/HR: 50 INJECTION INTRAMUSCULAR; INTRAVENOUS at 04:19

## 2017-05-16 RX ADMIN — SODIUM CHLORIDE SCH MLS/HR: 900 INJECTION INTRAVENOUS at 14:57

## 2017-05-16 RX ADMIN — SODIUM CHLORIDE SCH MLS/HR: 900 INJECTION INTRAVENOUS at 04:19

## 2017-05-16 RX ADMIN — Medication SCH ML: at 22:15

## 2017-05-16 NOTE — HHI.PR
Subjective


Remarks


Pt pleasantly confused. still requiring restraints because he tries to get out 

of bed.





Objective


Vitals





 Vital Signs








  Date Time  Temp Pulse Resp B/P Pulse Ox O2 Delivery O2 Flow Rate FiO2


 


5/16/17 16:00 95.6 69 18 131/72 96   


 


5/16/17 12:00 96.2 72 18 144/76 99   


 


5/16/17 08:00 97.5 51 17 139/66 94   


 


5/16/17 07:00  50      


 


5/16/17 04:00 97.5 60 20 114/65 91   


 


5/16/17 00:00 97.6 60 20 132/63 96   


 


5/15/17 20:00 96.4 76 20 150/70 96   








 I/O








 5/15/17 5/15/17 5/15/17 5/16/17 5/16/17 5/16/17





 07:00 15:00 23:00 07:00 15:00 23:00


 


Intake Total 966 ml  120 ml  660 ml 


 


Output Total   600 ml  1000 ml 


 


Balance 966 ml  -480 ml  -340 ml 


 


      


 


Intake Oral 120 ml  120 ml  660 ml 


 


IV Total 846 ml     


 


Output Urine Total   600 ml  1000 ml 


 


# Voids 3  6 1 1 


 


# Bowel Movements 0  0 0 1 








Result Diagram:  


5/16/17 0648                                                                   

             5/16/17 0648





Imaging


 


Last Impressions








Chest X-Ray 5/13/17 1654 Signed





Impressions: 





 Service Date/Time:  Saturday, May 13, 2017 17:08 - CONCLUSION:  Streaky 





 densities within the right perihilar region and left lung base consistent with 





 atelectasis and/or mild infiltrates.  Clinical correlation is recommended.    





 Adam Zambrano MD 


 


Head CT 5/13/17 0000 Signed





Impressions: 





 Service Date/Time:  Saturday, May 13, 2017 21:33 - CONCLUSION:  1.  Severe 





 diffuse periventricular and subcortical white matter small vessel ischemic 





 changes bilaterally. 2.  Diffuse cerebral atrophy. 3.  No acute infarct, acute 





 hemorrhage, mass effect or extra-axial fluid collections.     Adam Zambrano MD 








Objective Remarks


GENERAL: Cachectic elderly man, alert and awake, pleasantly confused


CARDIOVASCULAR: Regular rate and rhythm without murmurs 


RESPIRATORY: Breath sounds equal bilaterally. No accessory muscle use.


GASTROINTESTINAL: Abdomen soft, non-tender, nondistended. 


MUSCULOSKELETAL: in restraints but able to move extremities


EXTREMITIES: No edema





A/P


Assessment and Plan


Altered mental status


- CT head negative


- History of fever and headache however pt denies this at this time. 


- LP shows WBC 11, RBC 1607 and protein 45.6.  ID following and has 

discontinued vanc/amp/acyclovir.switched rocephin IV to meropenem due to E. 

Coli ESBL. appreciate input. 


- cultures growing E. Coli ESBL and repeat blood cx negx2 day. and CSF neg x 48 

hrs





Lactic acidosis


resolved. see above





COPD


- No exacerbation


- No steroids indicated


- DuoNeb's when necessary





 





DVT GI prophylaxis


- Subcutaneous heparin


- Pepcid


Discharge Planning


Pt on IV meropenem. f/u final repeat blood cx.


awaiting final recs from ID


I have requested a sitter for pt as w his dementia he gets agitated and tries 

to get out of bed. His vision is very poor, therefore he is at increase risk 

for falls. Try to remove restraints








Dora Ramirez MD May 16, 2017 16:37

## 2017-05-16 NOTE — HHI.IDPN
Note


Infectious Disease Note

















Patient is confused. Talking much. 


Want's to be in his own home. 


Laying in bed in no acute distress.


Eyes open.


Patient is blind.


Afebrile.


CSF culture has no growth.





Blood culture has ESBL E.coli. 





Patient was brought to the Ed by his daughter with altered mental status. 


 


PAST MEDICAL HISTORY:


1. Asthma.


2. Hypertension.


3. COPD.


4. Glaucoma.


5. Cataract surgery.


6. Diabetes mellitus.


 


ALLERGIES:


CONTRAST MEDIA.


 


NO KNOWN DRUG ALLERGIES.


 


ANTIBIOTICS:


Meropenem.


 


OBJECTIVE:





 Vital Signs








  Date Time  Temp Pulse Resp B/P Pulse Ox O2 Delivery O2 Flow Rate FiO2


 


5/16/17 12:00 96.2 72 18 144/76 99   


 


5/16/17 08:00 97.5 51 17 139/66 94   


 


5/16/17 07:00  50      


 


5/16/17 04:00 97.5 60 20 114/65 91   


 


5/16/17 00:00 97.6 60 20 132/63 96   


 


5/15/17 20:00 96.4 76 20 150/70 96   








 








 5/15/17 5/15/17 5/16/17





 14:59 22:59 06:59


 


Intake Total  120 ml 


 


Output Total  600 ml 


 


Balance  -480 ml 


 


   


 


Intake Oral  120 ml 


 


Output Urine Total  600 ml 


 


# Voids  6 1


 


# Bowel Movements  0 0








Laboratory Tests








Test 5/15/17 5/16/17





 06:11 06:48


 


White Blood Count 12.3 TH/MM3 9.4 TH/MM3


 


Red Blood Count 4.50 MIL/MM3 4.57 MIL/MM3


 


Hemoglobin 11.8 GM/DL 12.4 GM/DL


 


Hematocrit 36.7 % 36.8 %


 


Mean Corpuscular Volume 81.7 FL 80.5 FL


 


Mean Corpuscular Hemoglobin 26.2 PG 27.2 PG


 


Mean Corpuscular Hemoglobin 32.0 % 33.8 %





Concent  


 


Red Cell Distribution Width 16.3 % 16.5 %


 


Platelet Count 139 TH/MM3 135 TH/MM3


 


Mean Platelet Volume 8.3 FL 8.3 FL


 


Neutrophils (%) (Auto) 74.2 % 54.7 %


 


Lymphocytes (%) (Auto) 14.0 % 25.5 %


 


Monocytes (%) (Auto) 6.5 % 10.3 %


 


Eosinophils (%) (Auto) 4.8 % 8.0 %


 


Basophils (%) (Auto) 0.5 % 1.5 %


 


Neutrophils # (Auto) 9.1 TH/MM3 5.2 TH/MM3


 


Lymphocytes # (Auto) 1.7 TH/MM3 2.4 TH/MM3


 


Monocytes # (Auto) 0.8 TH/MM3 1.0 TH/MM3


 


Eosinophils # (Auto) 0.6 TH/MM3 0.8 TH/MM3


 


Basophils # (Auto) 0.1 TH/MM3 0.1 TH/MM3


 


CBC Comment DIFF FINAL  DIFF FINAL 


 


Differential Comment    








Laboratory Tests








Test 5/15/17 5/16/17





 06:11 06:48


 


Sodium Level 142 MEQ/L 140 MEQ/L


 


Potassium Level 3.5 MEQ/L 3.8 MEQ/L


 


Chloride Level 110 MEQ/L 106 MEQ/L


 


Carbon Dioxide Level 26.5 MEQ/L 26.6 MEQ/L


 


Anion Gap 6 MEQ/L 7 MEQ/L


 


Blood Urea Nitrogen 7 MG/DL 6 MG/DL


 


Creatinine 0.65 MG/DL 0.66 MG/DL


 


Estimat Glomerular Filtration 146 ML/ ML/MIN





Rate  


 


Random Glucose 69 MG/DL 65 MG/DL


 


Calcium Level 9.1 MG/DL 8.8 MG/DL








Microbiology








 Date/Time Procedure Status





Source Growth 


 


 5/13/17 16:50 Aerobic Blood Culture - Preliminary Resulted





Blood Peripheral NO GROWTH IN 3 DAYS 





 5/13/17 16:50 Anaerobic Blood Culture - Final Resulted





 Escherichia Coli Esbl Positive 


 


 5/13/17 17:20 Aerobic Blood Culture - Preliminary Resulted





Blood Peripheral NO GROWTH IN 3 DAYS 





 5/13/17 17:20 Anaerobic Blood Culture - Preliminary Resulted





Blood Peripheral NO GROWTH IN 3 DAYS 


 


 5/13/17 22:45 Gram Stain - Final Complete





Cerebral Spinal Fluid Lumbar Puncture  





 5/13/17 22:45 CSF Culture - Final Complete





Cerebral Spinal Fluid Lumbar Puncture NO GROWTH IN 72 HOURS 


 


 5/14/17 10:40 Aerobic Blood Culture - Preliminary Resulted





Blood Peripheral NO GROWTH IN 2 DAYS 





 5/14/17 10:40 Anaerobic Blood Culture - Preliminary Resulted





Blood Peripheral NO GROWTH IN 2 DAYS 


 


 5/14/17 10:44 Aerobic Blood Culture - Preliminary Resulted





Blood Peripheral NO GROWTH IN 2 DAYS 





 5/14/17 10:44 Anaerobic Blood Culture - Preliminary Resulted





Blood Peripheral NO GROWTH IN 2 DAYS 











IMAGING:














Chest X-Ray 5/13/17 3189 Signed





Impressions: 





 Service Date/Time:  Saturday, May 13, 2017 17:08 - CONCLUSION:  Streaky 





 densities within the right perihilar region and left lung base consistent with 





 atelectasis and/or mild infiltrates.  Clinical correlation is recommended.    





 Adam Zambrano MD 


 


Head CT 5/13/17 0000 Signed





Impressions: 





 Service Date/Time:  Saturday, May 13, 2017 21:33 - CONCLUSION:  1.  Severe 





 diffuse periventricular and subcortical white matter small vessel ischemic 





 changes bilaterally. 2.  Diffuse cerebral atrophy. 3.  No acute infarct, acute 





 hemorrhage, mass effect or extra-axial fluid collections.     Adam Zambrano MD 








PHYSICAL EXAMINATION:


GENERAL: No acute distress. He is only oriented to person.


HEAD, EYES, EARS, NOSE, THROAT: The head is atraumatic. Extraocular movements


grossly intact. Pupils are reactive. Arcus senilis present in both eyes.


Oropharynx with no visible lesions.


NECK: The neck is supple. No adenopathy.


LUNGS: Clear breath sounds.


HEART: Regular S1-S2 without murmurs, rubs or gallops.


ABDOMEN: Bowel sounds present, soft, no tenderness. 


EXTREMITIES: No clubbing or cyanosis or edema.


NEUROLOGIC: Nonfocal.


 





 


IMPRESSION:


1. Gram-negative sepsis.  E. coli ESBL.


He presented with sepsis including fever, tachycardia, elevated


lactic acid and now with elevated white blood cell count.  


2. Altered mental status, probably related to sepsis.  Cerebrospinal fluid


studies not reflective of meningitis.


 


 


 


 


 


RECOMMENDATIONS:


1. Continue Meropenem


2.  Follow the repeat blood cultures.


3. Monitor clinical status.








Sincere Preciado MD May 16, 2017 15:36

## 2017-05-17 VITALS
DIASTOLIC BLOOD PRESSURE: 71 MMHG | SYSTOLIC BLOOD PRESSURE: 146 MMHG | HEART RATE: 57 BPM | TEMPERATURE: 97.2 F | OXYGEN SATURATION: 93 % | RESPIRATION RATE: 18 BRPM

## 2017-05-17 VITALS
HEART RATE: 67 BPM | DIASTOLIC BLOOD PRESSURE: 67 MMHG | TEMPERATURE: 95.9 F | OXYGEN SATURATION: 94 % | RESPIRATION RATE: 18 BRPM | SYSTOLIC BLOOD PRESSURE: 128 MMHG

## 2017-05-17 VITALS
HEART RATE: 58 BPM | RESPIRATION RATE: 18 BRPM | TEMPERATURE: 97.3 F | SYSTOLIC BLOOD PRESSURE: 116 MMHG | OXYGEN SATURATION: 99 % | DIASTOLIC BLOOD PRESSURE: 60 MMHG

## 2017-05-17 VITALS
OXYGEN SATURATION: 96 % | DIASTOLIC BLOOD PRESSURE: 65 MMHG | SYSTOLIC BLOOD PRESSURE: 130 MMHG | HEART RATE: 65 BPM | TEMPERATURE: 98 F | RESPIRATION RATE: 18 BRPM

## 2017-05-17 VITALS
RESPIRATION RATE: 20 BRPM | HEART RATE: 70 BPM | OXYGEN SATURATION: 96 % | SYSTOLIC BLOOD PRESSURE: 124 MMHG | TEMPERATURE: 97.9 F | DIASTOLIC BLOOD PRESSURE: 70 MMHG

## 2017-05-17 VITALS
SYSTOLIC BLOOD PRESSURE: 134 MMHG | TEMPERATURE: 96.9 F | OXYGEN SATURATION: 93 % | DIASTOLIC BLOOD PRESSURE: 72 MMHG | HEART RATE: 54 BPM | RESPIRATION RATE: 18 BRPM

## 2017-05-17 VITALS — HEART RATE: 58 BPM

## 2017-05-17 VITALS — RESPIRATION RATE: 22 BRPM | TEMPERATURE: 97.7 F

## 2017-05-17 VITALS — HEART RATE: 61 BPM

## 2017-05-17 LAB
ANION GAP SERPL CALC-SCNC: 9 MEQ/L (ref 5–15)
B. BURGDORFERI DNA PCR CSF: NOT DETECTED
BASOPHILS # BLD AUTO: 0.1 TH/MM3 (ref 0–0.2)
BASOPHILS NFR BLD: 1.1 % (ref 0–2)
BUN SERPL-MCNC: 10 MG/DL (ref 7–18)
CHLORIDE SERPL-SCNC: 110 MEQ/L (ref 98–107)
EOSINOPHIL # BLD: 0.6 TH/MM3 (ref 0–0.4)
EOSINOPHIL NFR BLD: 7.6 % (ref 0–4)
ERYTHROCYTE [DISTWIDTH] IN BLOOD BY AUTOMATED COUNT: 16.1 % (ref 11.6–17.2)
GFR SERPLBLD BASED ON 1.73 SQ M-ARVRAT: 134 ML/MIN (ref 89–?)
HCO3 BLD-SCNC: 23.3 MEQ/L (ref 21–32)
HCT VFR BLD CALC: 38.7 % (ref 39–51)
HEMO FLAGS: (no result)
LYMPHOCYTES # BLD AUTO: 2.8 TH/MM3 (ref 1–4.8)
LYMPHOCYTES NFR BLD AUTO: 37.8 % (ref 9–44)
MAGNESIUM SERPL-MCNC: 1.9 MG/DL (ref 1.5–2.5)
MCH RBC QN AUTO: 26.7 PG (ref 27–34)
MCHC RBC AUTO-ENTMCNC: 33.2 % (ref 32–36)
MCV RBC AUTO: 80.5 FL (ref 80–100)
MONOCYTES NFR BLD: 14 % (ref 0–8)
NEUTROPHILS # BLD AUTO: 2.9 TH/MM3 (ref 1.8–7.7)
NEUTROPHILS NFR BLD AUTO: 39.5 % (ref 16–70)
PLATELET # BLD: 144 TH/MM3 (ref 150–450)
POTASSIUM SERPL-SCNC: 3.9 MEQ/L (ref 3.5–5.1)
RBC # BLD AUTO: 4.8 MIL/MM3 (ref 4.5–5.9)
SODIUM SERPL-SCNC: 142 MEQ/L (ref 136–145)
WBC # BLD AUTO: 7.3 TH/MM3 (ref 4–11)

## 2017-05-17 PROCEDURE — B548ZZA ULTRASONOGRAPHY OF SUPERIOR VENA CAVA, GUIDANCE: ICD-10-PCS

## 2017-05-17 PROCEDURE — 02HV33Z INSERTION OF INFUSION DEVICE INTO SUPERIOR VENA CAVA, PERCUTANEOUS APPROACH: ICD-10-PCS

## 2017-05-17 RX ADMIN — SODIUM CHLORIDE SCH MLS/HR: 900 INJECTION INTRAVENOUS at 11:59

## 2017-05-17 RX ADMIN — METOPROLOL SUCCINATE SCH MG: 50 TABLET, FILM COATED, EXTENDED RELEASE ORAL at 08:24

## 2017-05-17 RX ADMIN — Medication SCH ML: at 21:00

## 2017-05-17 RX ADMIN — SODIUM CHLORIDE SCH MLS/HR: 900 INJECTION INTRAVENOUS at 06:00

## 2017-05-17 RX ADMIN — SODIUM CHLORIDE TAB 1 GM SCH GM: 1 TAB at 16:48

## 2017-05-17 RX ADMIN — FAMOTIDINE SCH MG: 20 TABLET, FILM COATED ORAL at 22:45

## 2017-05-17 RX ADMIN — CHLORHEXIDINE GLUCONATE SCH PACK: 500 CLOTH TOPICAL at 04:00

## 2017-05-17 RX ADMIN — SODIUM CHLORIDE TAB 1 GM SCH GM: 1 TAB at 08:24

## 2017-05-17 RX ADMIN — Medication SCH ML: at 08:25

## 2017-05-17 RX ADMIN — HEPARIN SODIUM SCH UNITS: 10000 INJECTION, SOLUTION INTRAVENOUS; SUBCUTANEOUS at 08:25

## 2017-05-17 RX ADMIN — ATORVASTATIN CALCIUM SCH MG: 80 TABLET, FILM COATED ORAL at 22:45

## 2017-05-17 RX ADMIN — MUPIROCIN CALCIUM SCH APPLIC: 20 OINTMENT TOPICAL at 08:25

## 2017-05-17 RX ADMIN — SODIUM CHLORIDE TAB 1 GM SCH GM: 1 TAB at 11:59

## 2017-05-17 RX ADMIN — SODIUM CHLORIDE SCH MLS/HR: 900 INJECTION INTRAVENOUS at 22:46

## 2017-05-17 RX ADMIN — MUPIROCIN CALCIUM SCH APPLIC: 20 OINTMENT TOPICAL at 21:00

## 2017-05-17 RX ADMIN — HEPARIN SODIUM SCH UNITS: 10000 INJECTION, SOLUTION INTRAVENOUS; SUBCUTANEOUS at 22:45

## 2017-05-17 RX ADMIN — ASPIRIN SCH MG: 81 TABLET ORAL at 08:24

## 2017-05-17 RX ADMIN — FAMOTIDINE SCH MG: 20 TABLET, FILM COATED ORAL at 08:24

## 2017-05-17 NOTE — RADRPT
EXAM DATE/TIME:  05/17/2017 16:02 

 

[facility] COMPARISON:     

CHEST SINGLE AP, May 13, 2017, 17:08.

 

                     

INDICATIONS :     

Pneumonia.

                     

 

MEDICAL HISTORY :     

Cardiovascular disease.  Hypertension  Diabetes mellitus type II.      

 

SURGICAL HISTORY :     

None.   

 

ENCOUNTER:     

Initial                                        

 

ACUITY:     

4 - 6 days      

 

PAIN SCORE:     

0/10

 

LOCATION:     

Bilateral chest 

 

FINDINGS:     

The heart is normal in size. There are COPD changes within the pulmonary parenchyma. The visualized b
axel structures are grossly intact.

 

CONCLUSION:     

COPD changes. No infiltrate identified.

 

 

 

 Geoffrey Caldwell MD on May 17, 2017 at 16:51           

Board Certified Radiologist.

 This report was verified electronically.

## 2017-05-17 NOTE — HHI.FF
Face to Face Verification


Diagnosis:  


(1) Sepsis


Physical Therapy


Order:  Evaluate and Treat, Improve ambulation, Strength and gait training





Home Health Nursing


Order:     Medical education


      Signs/symptoms of disease process


      Medication education-adverse effect


      Nursing assessment with vital signs


      IV medication administration








I have seen patient Shahram Hamilton on 5/17/17. My clinical findings support the 

need for the requested home health care services because:


Deconditioned w/ increased weakness








I certify that my clinical findings support that this patient is homebound 

because:


Unsafe to leave home unassisted








Shaq Lama MD May 17, 2017 12:44

## 2017-05-17 NOTE — HHI.PR
Subjective


Remarks


Follow-up encephalopathy.  He is improved alert and oriented to person, place 

but not to time and situation.  He wants to go home.  Off restraints since 6 PM 

last night.  Discussed with RN, had bigeminy/trigeminy overnight.  Repeat BMP 

noted.





Objective


Vitals





 Vital Signs








  Date Time  Temp Pulse Resp B/P Pulse Ox O2 Delivery O2 Flow Rate FiO2


 


5/17/17 08:30  61      


 


5/17/17 08:18 96.9 54 18 134/72 93   


 


5/17/17 06:21 97.9 70 20 124/70 96   


 


5/17/17 00:00 97.2 57 18 146/71 93   


 


5/16/17 20:00 96.7 62 18 139/69 96   


 


5/16/17 20:00  66      


 


5/16/17 16:00 95.6 69 18 131/72 96   


 


5/16/17 12:00 96.2 72 18 144/76 99   








 I/O








 5/16/17 5/16/17 5/16/17 5/17/17 5/17/17 5/17/17





 07:00 15:00 23:00 07:00 15:00 23:00


 


Intake Total  660 ml 720 ml   


 


Output Total  1000 ml 800 ml 600 ml  


 


Balance  -340 ml -80 ml -600 ml  


 


      


 


Intake Oral  660 ml 720 ml   


 


Output Urine Total  1000 ml 800 ml 600 ml  


 


# Voids 1 1    


 


# Bowel Movements 0 1 0 0  








Result Diagram:  


5/17/17 0800                                                                   

             5/17/17 0800





Imaging





Last Impressions








Chest X-Ray 5/13/17 1654 Signed





Impressions: 





 Service Date/Time:  Saturday, May 13, 2017 17:08 - CONCLUSION:  Streaky 





 densities within the right perihilar region and left lung base consistent with 





 atelectasis and/or mild infiltrates.  Clinical correlation is recommended.    





 Adam Zambrano MD 


 


Head CT 5/13/17 0000 Signed





Impressions: 





 Service Date/Time:  Saturday, May 13, 2017 21:33 - CONCLUSION:  1.  Severe 





 diffuse periventricular and subcortical white matter small vessel ischemic 





 changes bilaterally. 2.  Diffuse cerebral atrophy. 3.  No acute infarct, acute 





 hemorrhage, mass effect or extra-axial fluid collections.     Adam Zambrano MD 








Objective Remarks


GENERAL: Well-developed well-nourished in no distress


SKIN: Warm and dry.


HEAD: Atraumatic. Normocephalic. 


EYES: Pupils equal and round. No scleral icterus. No injection or drainage. 


ENT: No nasal bleeding or discharge.  Mucous membranes pink and moist.


NECK: Trachea midline. No JVD. 


CARDIOVASCULAR: Regular rate and rhythm.  


RESPIRATORY: No accessory muscle use. Clear to auscultation. Breath sounds 

equal bilaterally. 


GASTROINTESTINAL: Abdomen soft, non-tender, nondistended.


MUSCULOSKELETAL: Extremities without clubbing, cyanosis, or edema. No obvious 

deformities. 


NEUROLOGICAL: Awake and alert. No obvious cranial nerve deficits.  Motor 

grossly within normal limits. Five out of 5 muscle strength in the arms and 

legs.  Normal speech.


PSYCHIATRIC: Appropriate mood and affect; insight and judgment normal.


Procedures


Lumbar puncture





A/P


Problem List:  


(1) Sepsis


ICD Code:  A41.9


Status:  Acute


Assessment and Plan


Encephalopathy secondary to sepsis.  Improving


- CT head negative


- History of fever and headache however pt denies this at this time. 


- LP shows WBC 11, RBC 1607 and protein 45.6.  ID following and has 

discontinued vanc/amp/acyclovir and switched rocephin IV to meropenem due to E. 

Coli ESBL. 


- cultures growing E. Coli ESBL and repeat blood cx neg x3 day and CSF neg x 72 

hrs





Lactic acidosis


resolved. see above





COPD


- No exacerbation


- No steroids indicated


- DuoNeb's when necessary





 Bigeminy/trigeminy.  Replace potassium and magnesium.  A persistent consider 

echocardiogram.  Check TSH and repeat BMP and magnesium in the morning.  

Continue telemetry monitoring





DVT GI prophylaxis


- Subcutaneous heparin


- Pepcid


Discharge Planning


Rehabilitation versus home health care





Problem Qualifiers





(1) Sepsis:  


Qualified Code:  A41.9 - Sepsis, due to unspecified organism





Shaq Lama MD May 17, 2017 11:49

## 2017-05-17 NOTE — RADRPT
EXAM DATE/TIME:  05/17/2017 19:35 

 

[facility] COMPARISON:     

CHEST SINGLE AP, May 17, 2017, 16:02.

 

                     

INDICATIONS :     

Post PICC line placement.

                     

 

MEDICAL HISTORY :            

Dementia. Cardiovascular disease. Hypertension. COPD   

 

SURGICAL HISTORY :     

None.   

 

ENCOUNTER:     

Subsequent                                        

 

ACUITY:     

1 day      

 

PAIN SCORE:     

0/10

 

LOCATION:     

Bilateral chest 

 

FINDINGS:     

A single view of the chest demonstrates the lungs to be symmetrically aerated without evidence of mas
s, infiltrate or effusion.  The cardiomediastinal contours are unremarkable.  Osseous structures are 
intact. A PICC line is in place via left-sided approach with its tip in the region of the superior ve
na cava.

 

CONCLUSION:     

1. Uncomplicated PICC line placement.

 

 

 

 Lino Ochoa MD on May 17, 2017 at 20:18           

Board Certified Radiologist.

 This report was verified electronically.

## 2017-05-17 NOTE — HHI.IDPN
Note


Infectious Disease Note

















Patient is more alert. 


Laying in bed in no acute distress.


Eyes open.


Patient is blind.


No fever. 


CSF culture has no growth.





5/13 Blood culture has ESBL E.coli. 


5/14 Blood culture is negative. 





Patient was brought to the Ed by his daughter with altered mental status. 


 


PAST MEDICAL HISTORY:


1. Asthma.


2. Hypertension.


3. COPD.


4. Glaucoma.


5. Cataract surgery.


6. Diabetes mellitus.


 


ALLERGIES:


CONTRAST MEDIA.


 


NO KNOWN DRUG ALLERGIES.


 


ANTIBIOTICS:


Meropenem.


 


OBJECTIVE:





 Vital Signs








  Date Time  Temp Pulse Resp B/P Pulse Ox O2 Delivery O2 Flow Rate FiO2


 


5/17/17 12:48 95.9 67 18 128/67 94   


 


5/17/17 08:30  61      


 


5/17/17 08:18 96.9 54 18 134/72 93   


 


5/17/17 06:21 97.9 70 20 124/70 96   


 


5/17/17 00:00 97.2 57 18 146/71 93   


 


5/16/17 20:00 96.7 62 18 139/69 96   


 


5/16/17 20:00  66      


 


5/16/17 16:00 95.6 69 18 131/72 96   








 








 5/16/17 5/16/17 5/17/17





 15:00 23:00 07:00


 


Intake Total 660 ml 720 ml 


 


Output Total 1000 ml 800 ml 600 ml


 


Balance -340 ml -80 ml -600 ml


 


   


 


Intake Oral 660 ml 720 ml 


 


Output Urine Total 1000 ml 800 ml 600 ml


 


# Voids 1  


 


# Bowel Movements 1 0 0








Laboratory Tests








Test 5/16/17 5/17/17





 06:48 08:00


 


White Blood Count 9.4 TH/MM3 7.3 TH/MM3


 


Red Blood Count 4.57 MIL/MM3 4.80 MIL/MM3


 


Hemoglobin 12.4 GM/DL 12.8 GM/DL


 


Hematocrit 36.8 % 38.7 %


 


Mean Corpuscular Volume 80.5 FL 80.5 FL


 


Mean Corpuscular Hemoglobin 27.2 PG 26.7 PG


 


Mean Corpuscular Hemoglobin 33.8 % 33.2 %





Concent  


 


Red Cell Distribution Width 16.5 % 16.1 %


 


Platelet Count 135 TH/MM3 144 TH/MM3


 


Mean Platelet Volume 8.3 FL 8.2 FL


 


Neutrophils (%) (Auto) 54.7 % 39.5 %


 


Lymphocytes (%) (Auto) 25.5 % 37.8 %


 


Monocytes (%) (Auto) 10.3 % 14.0 %


 


Eosinophils (%) (Auto) 8.0 % 7.6 %


 


Basophils (%) (Auto) 1.5 % 1.1 %


 


Neutrophils # (Auto) 5.2 TH/MM3 2.9 TH/MM3


 


Lymphocytes # (Auto) 2.4 TH/MM3 2.8 TH/MM3


 


Monocytes # (Auto) 1.0 TH/MM3 1.0 TH/MM3


 


Eosinophils # (Auto) 0.8 TH/MM3 0.6 TH/MM3


 


Basophils # (Auto) 0.1 TH/MM3 0.1 TH/MM3


 


CBC Comment DIFF FINAL  DIFF FINAL 


 


Differential Comment    








Laboratory Tests








Test 5/16/17 5/17/17





 06:48 08:00


 


Sodium Level 140 MEQ/L 142 MEQ/L


 


Potassium Level 3.8 MEQ/L 3.9 MEQ/L


 


Chloride Level 106 MEQ/L 110 MEQ/L


 


Carbon Dioxide Level 26.6 MEQ/L 23.3 MEQ/L


 


Anion Gap 7 MEQ/L 9 MEQ/L


 


Blood Urea Nitrogen 6 MG/DL 10 MG/DL


 


Creatinine 0.66 MG/DL 0.70 MG/DL


 


Estimat Glomerular Filtration 143 ML/ ML/MIN





Rate  


 


Random Glucose 65 MG/DL 82 MG/DL


 


Calcium Level 8.8 MG/DL 9.0 MG/DL


 


Magnesium Level  1.9 MG/DL


 


Thyroid Stimulating Hormone  2.420 uIU/ML





3rd Gen  








Microbiology








 Date/Time Procedure Status





Source Growth 


 


 5/13/17 16:50 Aerobic Blood Culture - Preliminary Resulted





Blood Peripheral NO GROWTH IN 3 DAYS 





 5/13/17 16:50 Anaerobic Blood Culture - Final Resulted





 Escherichia Coli Esbl Positive 


 


 5/13/17 17:20 Aerobic Blood Culture - Preliminary Resulted





Blood Peripheral NO GROWTH IN 3 DAYS 





 5/13/17 17:20 Anaerobic Blood Culture - Preliminary Resulted





Blood Peripheral NO GROWTH IN 3 DAYS 


 


 5/13/17 22:45 Gram Stain - Final Complete





Cerebral Spinal Fluid Lumbar Puncture  





 5/13/17 22:45 CSF Culture - Final Complete





Cerebral Spinal Fluid Lumbar Puncture NO GROWTH IN 72 HOURS 


 


 5/14/17 10:40 Aerobic Blood Culture - Preliminary Resulted





Blood Peripheral NO GROWTH IN 2 DAYS 





 5/14/17 10:40 Anaerobic Blood Culture - Preliminary Resulted





Blood Peripheral NO GROWTH IN 2 DAYS 


 


 5/14/17 10:44 Aerobic Blood Culture - Preliminary Resulted





Blood Peripheral NO GROWTH IN 2 DAYS 





 5/14/17 10:44 Anaerobic Blood Culture - Preliminary Resulted





Blood Peripheral NO GROWTH IN 2 DAYS 











IMAGING:














Chest X-Ray 5/13/17 1654 Signed





Impressions: 





 Service Date/Time:  Saturday, May 13, 2017 17:08 - CONCLUSION:  Streaky 





 densities within the right perihilar region and left lung base consistent with 





 atelectasis and/or mild infiltrates.  Clinical correlation is recommended.    





 Adam Zambrano MD 


 


Head CT 5/13/17 0000 Signed





Impressions: 





 Service Date/Time:  Saturday, May 13, 2017 21:33 - CONCLUSION:  1.  Severe 





 diffuse periventricular and subcortical white matter small vessel ischemic 





 changes bilaterally. 2.  Diffuse cerebral atrophy. 3.  No acute infarct, acute 





 hemorrhage, mass effect or extra-axial fluid collections.     Adam Zambrano MD 








PHYSICAL EXAMINATION:


GENERAL: No acute distress. Oriented to person, year.


HEAD, EYES, EARS, NOSE, THROAT: The head is atraumatic. Extraocular movements


grossly intact. Pupils are reactive. Arcus senilis present in both eyes.


Oropharynx with no visible lesions.


NECK: The neck is supple. No adenopathy.


LUNGS: Clear breath sounds.


HEART: Regular S1-S2 without murmurs, rubs or gallops.


ABDOMEN: Bowel sounds present, soft, no tenderness. 


EXTREMITIES: No clubbing or cyanosis or edema.


NEUROLOGIC: Nonfocal.


Psych: Calm and cooperative. 


 





 


IMPRESSION:


1. Gram-negative sepsis.  E. coli ESBL. Probably pulmonary source. 


Abnormal CXR on admission.


He presented with sepsis including fever, tachycardia, elevated


lactic acid and now with elevated white blood cell count.  


2. Altered mental status, probably related to sepsis.  Cerebrospinal fluid


studies not reflective of meningitis. Mental status improved. 


 


 


 


 


 


RECOMMENDATIONS:


1. Continue Meropenem


2. Repeat the CXR. 


Arrangements for outpatient IV antibiotics. He will need a PIC 


and 7 days more IV antibiotics. I spoke to his daughter who will be talking to 

case management


about home vs SNF. The IV Meropenem can be switched to Invanz if the CXR is 

improved.








Sincere Preciado MD May 17, 2017 15:48

## 2017-05-18 VITALS
DIASTOLIC BLOOD PRESSURE: 65 MMHG | TEMPERATURE: 97.3 F | RESPIRATION RATE: 18 BRPM | OXYGEN SATURATION: 98 % | HEART RATE: 63 BPM | SYSTOLIC BLOOD PRESSURE: 144 MMHG

## 2017-05-18 VITALS
HEART RATE: 62 BPM | DIASTOLIC BLOOD PRESSURE: 75 MMHG | TEMPERATURE: 97.3 F | RESPIRATION RATE: 18 BRPM | SYSTOLIC BLOOD PRESSURE: 135 MMHG | OXYGEN SATURATION: 95 %

## 2017-05-18 VITALS
DIASTOLIC BLOOD PRESSURE: 61 MMHG | RESPIRATION RATE: 18 BRPM | SYSTOLIC BLOOD PRESSURE: 136 MMHG | TEMPERATURE: 97.9 F | OXYGEN SATURATION: 96 % | HEART RATE: 61 BPM

## 2017-05-18 VITALS
HEART RATE: 80 BPM | OXYGEN SATURATION: 95 % | DIASTOLIC BLOOD PRESSURE: 88 MMHG | RESPIRATION RATE: 20 BRPM | TEMPERATURE: 98 F | SYSTOLIC BLOOD PRESSURE: 120 MMHG

## 2017-05-18 VITALS
TEMPERATURE: 97.2 F | RESPIRATION RATE: 17 BRPM | DIASTOLIC BLOOD PRESSURE: 62 MMHG | HEART RATE: 77 BPM | SYSTOLIC BLOOD PRESSURE: 137 MMHG | OXYGEN SATURATION: 100 %

## 2017-05-18 VITALS — HEART RATE: 65 BPM

## 2017-05-18 LAB
ANION GAP SERPL CALC-SCNC: 8 MEQ/L (ref 5–15)
BUN SERPL-MCNC: 8 MG/DL (ref 7–18)
CHLORIDE SERPL-SCNC: 105 MEQ/L (ref 98–107)
GFR SERPLBLD BASED ON 1.73 SQ M-ARVRAT: 143 ML/MIN (ref 89–?)
HCO3 BLD-SCNC: 27.4 MEQ/L (ref 21–32)
MAGNESIUM SERPL-MCNC: 2 MG/DL (ref 1.5–2.5)
POTASSIUM SERPL-SCNC: 3.9 MEQ/L (ref 3.5–5.1)
REAGIN AB CSF QL: (no result)
SODIUM SERPL-SCNC: 140 MEQ/L (ref 136–145)

## 2017-05-18 RX ADMIN — SODIUM CHLORIDE TAB 1 GM SCH GM: 1 TAB at 17:12

## 2017-05-18 RX ADMIN — ATORVASTATIN CALCIUM SCH MG: 80 TABLET, FILM COATED ORAL at 21:05

## 2017-05-18 RX ADMIN — FAMOTIDINE SCH MG: 20 TABLET, FILM COATED ORAL at 21:04

## 2017-05-18 RX ADMIN — ATORVASTATIN CALCIUM SCH MG: 80 TABLET, FILM COATED ORAL at 21:09

## 2017-05-18 RX ADMIN — Medication SCH ML: at 21:03

## 2017-05-18 RX ADMIN — MUPIROCIN CALCIUM SCH APPLIC: 20 OINTMENT TOPICAL at 09:04

## 2017-05-18 RX ADMIN — METOPROLOL SUCCINATE SCH MG: 50 TABLET, FILM COATED, EXTENDED RELEASE ORAL at 09:05

## 2017-05-18 RX ADMIN — HEPARIN SODIUM SCH UNITS: 10000 INJECTION, SOLUTION INTRAVENOUS; SUBCUTANEOUS at 21:04

## 2017-05-18 RX ADMIN — ASPIRIN SCH MG: 81 TABLET ORAL at 09:05

## 2017-05-18 RX ADMIN — SODIUM CHLORIDE TAB 1 GM SCH GM: 1 TAB at 09:04

## 2017-05-18 RX ADMIN — ERTAPENEM SODIUM SCH MLS/HR: 1 INJECTION, POWDER, LYOPHILIZED, FOR SOLUTION INTRAMUSCULAR; INTRAVENOUS at 15:12

## 2017-05-18 RX ADMIN — Medication SCH ML: at 09:00

## 2017-05-18 RX ADMIN — FAMOTIDINE SCH MG: 20 TABLET, FILM COATED ORAL at 09:05

## 2017-05-18 RX ADMIN — SODIUM CHLORIDE TAB 1 GM SCH GM: 1 TAB at 15:11

## 2017-05-18 RX ADMIN — HEPARIN SODIUM SCH UNITS: 10000 INJECTION, SOLUTION INTRAVENOUS; SUBCUTANEOUS at 09:04

## 2017-05-18 RX ADMIN — SODIUM CHLORIDE SCH MLS/HR: 900 INJECTION INTRAVENOUS at 05:00

## 2017-05-18 RX ADMIN — Medication SCH ML: at 09:05

## 2017-05-18 RX ADMIN — CHLORHEXIDINE GLUCONATE SCH PACK: 500 CLOTH TOPICAL at 04:00

## 2017-05-18 RX ADMIN — HEPARIN SODIUM (PORCINE) LOCK FLUSH IV SOLN 100 UNIT/ML SCH UNITS: 100 SOLUTION at 09:03

## 2017-05-18 RX ADMIN — MUPIROCIN CALCIUM SCH APPLIC: 20 OINTMENT TOPICAL at 21:04

## 2017-05-18 NOTE — HHI.IDPN
Note


Infectious Disease Note

















Patient is very alert. 


No acute distress.


No fever. 


PIC placed 5/17. 


CSF culture has no growth.





5/13 Blood culture has ESBL E.coli. 


5/14 Blood culture is negative. 





Patient was brought to the Ed by his daughter with altered mental status. 


 


PAST MEDICAL HISTORY:


1. Asthma.


2. Hypertension.


3. COPD.


4. Glaucoma.


5. Cataract surgery.


6. Diabetes mellitus.


 


ALLERGIES:


CONTRAST MEDIA.


 


NO KNOWN DRUG ALLERGIES.


 


ANTIBIOTICS:


Meropenem.


 


OBJECTIVE:





 Vital Signs








  Date Time  Temp Pulse Resp B/P Pulse Ox O2 Delivery O2 Flow Rate FiO2


 


5/18/17 08:13 97.2 77 17 137/62 100   


 


5/18/17 08:00  65      


 


5/18/17 06:15 98.0 80 20 120/88 95   


 


5/17/17 22:50 98.0 65 18 130/65 96   


 


5/17/17 21:30 97.7  22     


 


5/17/17 20:15  58      


 


5/17/17 16:15 97.3 58 18 116/60 99   








 








 5/17/17 5/17/17 5/18/17





 14:59 22:59 06:59


 


Intake Total 600 ml 0 ml 200 ml


 


Output Total 500 ml 800 ml 


 


Balance 100 ml -800 ml 200 ml


 


   


 


Intake Oral 600 ml 0 ml 200 ml


 


Output Urine Total 500 ml 800 ml 


 


# Voids  1 2


 


# Bowel Movements 2 0 0








Laboratory Tests








Test 5/17/17





 08:00


 


White Blood Count 7.3 TH/MM3


 


Red Blood Count 4.80 MIL/MM3


 


Hemoglobin 12.8 GM/DL


 


Hematocrit 38.7 %


 


Mean Corpuscular Volume 80.5 FL


 


Mean Corpuscular Hemoglobin 26.7 PG


 


Mean Corpuscular Hemoglobin 33.2 %





Concent 


 


Red Cell Distribution Width 16.1 %


 


Platelet Count 144 TH/MM3


 


Mean Platelet Volume 8.2 FL


 


Neutrophils (%) (Auto) 39.5 %


 


Lymphocytes (%) (Auto) 37.8 %


 


Monocytes (%) (Auto) 14.0 %


 


Eosinophils (%) (Auto) 7.6 %


 


Basophils (%) (Auto) 1.1 %


 


Neutrophils # (Auto) 2.9 TH/MM3


 


Lymphocytes # (Auto) 2.8 TH/MM3


 


Monocytes # (Auto) 1.0 TH/MM3


 


Eosinophils # (Auto) 0.6 TH/MM3


 


Basophils # (Auto) 0.1 TH/MM3


 


CBC Comment DIFF FINAL 


 


Differential Comment  








Laboratory Tests








Test 5/17/17 5/18/17





 08:00 06:30


 


Sodium Level 142 MEQ/L 140 MEQ/L


 


Potassium Level 3.9 MEQ/L 3.9 MEQ/L


 


Chloride Level 110 MEQ/L 105 MEQ/L


 


Carbon Dioxide Level 23.3 MEQ/L 27.4 MEQ/L


 


Anion Gap 9 MEQ/L 8 MEQ/L


 


Blood Urea Nitrogen 10 MG/DL 8 MG/DL


 


Creatinine 0.70 MG/DL 0.66 MG/DL


 


Estimat Glomerular Filtration 134 ML/ ML/MIN





Rate  


 


Random Glucose 82 MG/DL 82 MG/DL


 


Calcium Level 9.0 MG/DL 9.0 MG/DL


 


Magnesium Level 1.9 MG/DL 2.0 MG/DL


 


Thyroid Stimulating Hormone 2.420 uIU/ML 





3rd Gen  








Microbiology








 Date/Time Procedure Status





Source Growth 


 


 5/13/17 16:50 Aerobic Blood Culture - Preliminary Resulted





Blood Peripheral NO GROWTH IN 3 DAYS 





 5/13/17 16:50 Anaerobic Blood Culture - Final Resulted





 Escherichia Coli Esbl Positive 


 


 5/13/17 17:20 Aerobic Blood Culture - Preliminary Resulted





Blood Peripheral NO GROWTH IN 3 DAYS 





 5/13/17 17:20 Anaerobic Blood Culture - Preliminary Resulted





Blood Peripheral NO GROWTH IN 3 DAYS 


 


 5/13/17 22:45 Gram Stain - Final Complete





Cerebral Spinal Fluid Lumbar Puncture  





 5/13/17 22:45 CSF Culture - Final Complete





Cerebral Spinal Fluid Lumbar Puncture NO GROWTH IN 72 HOURS 


 


 5/14/17 10:40 Aerobic Blood Culture - Preliminary Resulted





Blood Peripheral NO GROWTH IN 2 DAYS 





 5/14/17 10:40 Anaerobic Blood Culture - Preliminary Resulted





Blood Peripheral NO GROWTH IN 2 DAYS 


 


 5/14/17 10:44 Aerobic Blood Culture - Preliminary Resulted





Blood Peripheral NO GROWTH IN 2 DAYS 





 5/14/17 10:44 Anaerobic Blood Culture - Preliminary Resulted





Blood Peripheral NO GROWTH IN 2 DAYS 











IMAGING:














Chest X-Ray 5/17/17 0000 Signed





Impressions: 





 Service Date/Time:  Wednesday, May 17, 2017 19:35 - CONCLUSION:  1. 





 Uncomplicated PICC line placement.     Lino Ochoa MD 


 


Chest X-Ray 5/17/17 0000 Signed





Impressions: 





 Service Date/Time:  Wednesday, May 17, 2017 16:02 - CONCLUSION:  COPD changes. 





 No infiltrate identified.     Geoffrey Caldwell MD 

















Chest X-Ray 5/13/17 1654 Signed





Impressions: 





 Service Date/Time:  Saturday, May 13, 2017 17:08 - CONCLUSION:  Streaky 





 densities within the right perihilar region and left lung base consistent with 





 atelectasis and/or mild infiltrates.  Clinical correlation is recommended.    





 Adam Zambrano MD 


 


Head CT 5/13/17 0000 Signed





Impressions: 





 Service Date/Time:  Saturday, May 13, 2017 21:33 - CONCLUSION:  1.  Severe 





 diffuse periventricular and subcortical white matter small vessel ischemic 





 changes bilaterally. 2.  Diffuse cerebral atrophy. 3.  No acute infarct, acute 





 hemorrhage, mass effect or extra-axial fluid collections.     Adam Zambrano MD 








PHYSICAL EXAMINATION:


GENERAL: No acute distress. Oriented to person, year.


HEENT: Extraocular movements grossly intact. Pupils are reactive. 


Arcus senilis present in both eyes.


Oropharynx with no visible lesions.


NECK: Supple without adenopathy.


LUNGS: Clear breath sounds.


HEART: Regular S1-S2 without murmurs, rubs or gallops.


ABDOMEN: Bowel sounds present, soft, no tenderness. 


EXTREMITIES: No clubbing or cyanosis or edema.


NEUROLOGIC: Nonfocal.


Psych: Calm, cooperative anxious.  


 





 


IMPRESSION:


1. Gram-negative sepsis.  E. coli ESBL. Probably pulmonary source. 


Abnormal CXR on admission. Repeat CXR has no infiltrates. 


He presented with sepsis including fever, tachycardia, elevated


lactic acid and now with elevated white blood cell count.  


2. Altered mental status, probably related to sepsis.  Cerebrospinal fluid


studies not reflective of meningitis. Mental status improved. 


 


Clinically stable. 


 


 


 


RECOMMENDATIONS:


1. Stop Meropenem


2. Ertapenem IV until 5/24/17. Orders written. (See infusion form).


3. Patient can be discharged from ID standpoint. 


I will sign off now. 











Sincere Preciado MD May 18, 2017 13:02

## 2017-05-18 NOTE — HHI.PR
Subjective


Remarks


Follow-up sepsis and encephalopathy.  No acute changes overnight did not 

require restraints.  He has been calm and cooperative.  Discussed with RN, 

stable for discharge pending accepting facility.





Objective


Vitals





 Vital Signs








  Date Time  Temp Pulse Resp B/P Pulse Ox O2 Delivery O2 Flow Rate FiO2


 


5/18/17 08:13 97.2 77 17 137/62 100   


 


5/18/17 06:15 98.0 80 20 120/88 95   


 


5/17/17 22:50 98.0 65 18 130/65 96   


 


5/17/17 21:30 97.7  22     


 


5/17/17 20:15  58      


 


5/17/17 16:15 97.3 58 18 116/60 99   


 


5/17/17 12:48 95.9 67 18 128/67 94   








 I/O








 5/17/17 5/17/17 5/17/17 5/18/17 5/18/17 5/18/17





 06:59 14:59 22:59 06:59 14:59 22:59


 


Intake Total  600 ml 0 ml 200 ml  


 


Output Total 600 ml 500 ml 800 ml   


 


Balance -600 ml 100 ml -800 ml 200 ml  


 


      


 


Intake Oral  600 ml 0 ml 200 ml  


 


Output Urine Total 600 ml 500 ml 800 ml   


 


# Voids   1 2  


 


# Bowel Movements 0 2 0 0  








Result Diagram:  


5/17/17 0800                                                                   

             5/18/17 0630





Imaging





Last Impressions








Chest X-Ray 5/17/17 0000 Signed





Impressions: 





 Service Date/Time:  Wednesday, May 17, 2017 19:35 - CONCLUSION:  1. 





 Uncomplicated PICC line placement.     Lino Ochoa MD 


 


Head CT 5/13/17 0000 Signed





Impressions: 





 Service Date/Time:  Saturday, May 13, 2017 21:33 - CONCLUSION:  1.  Severe 





 diffuse periventricular and subcortical white matter small vessel ischemic 





 changes bilaterally. 2.  Diffuse cerebral atrophy. 3.  No acute infarct, acute 





 hemorrhage, mass effect or extra-axial fluid collections.     Adam Zambrano MD 








Objective Remarks


GENERAL: Well-developed well-nourished in no distress


SKIN: Warm and dry.


HEAD: Atraumatic. Normocephalic. 


EYES: Pupils equal and round. No scleral icterus. No injection or drainage. 


ENT: No nasal bleeding or discharge.  Mucous membranes pink and moist.


NECK: Trachea midline. No JVD. 


CARDIOVASCULAR: Regular rate and rhythm.  


RESPIRATORY: No accessory muscle use. Clear to auscultation. Breath sounds 

equal bilaterally. 


GASTROINTESTINAL: Abdomen soft, non-tender, nondistended.


MUSCULOSKELETAL: Extremities without clubbing, cyanosis, or edema. No obvious 

deformities. 


NEUROLOGICAL: Awake and alert. No obvious cranial nerve deficits.  Motor 

grossly within normal limits. Five out of 5 muscle strength in the arms and 

legs.  Normal speech.


Procedures


Lumbar puncture





A/P


Problem List:  


(1) Sepsis


ICD Code:  A41.9


Status:  Acute


Assessment and Plan


Encephalopathy secondary to sepsis.  Improving


- CT head negative


- History of fever and headache however pt denies this at this time. 


- LP shows WBC 11, RBC 1607 and protein 45.6.  ID following and has 

discontinued vanc/amp/acyclovir and switched rocephin IV to meropenem due to E. 

Coli ESBL. 


- cultures growing E. Coli ESBL and repeat blood cx and CSF neg to date





Lactic acidosis


resolved. see above





COPD


- No exacerbation


- No steroids indicated


- DuoNeb's when necessary





 Bigeminy/trigeminy.  Replace potassium and magnesium.  If persistent consider 

echocardiogram.   TSH and repeat BMP and magnesium unremarkable.  Continue 

telemetry monitoring





DVT GI prophylaxis


- Subcutaneous heparin


- Pepcid


Discharge Planning


Stable for discharge





Problem Qualifiers





(1) Sepsis:  


Qualified Code:  A41.9 - Sepsis, due to unspecified organism





Shaq Lama MD May 18, 2017 09:38

## 2017-05-18 NOTE — HHI.FF
__________________________________________________





Infusion Therapy


Location of Infusion Therapy:  [facility] Infusion Therapy Order


Patient Information


Patient Weight


56 kg


Diagnosis:  


(1) Sepsis


Coded Allergies:  


     Contrast Media (Verified  Allergy, Mild, HIVES, ITCHING, 5/13/17)


     *MDRO Multi-Drug Resistant Organism (Verified  Adverse Reaction, Unknown, 5 /17/17)


 MRSA PCR screen positive-05/13/17


 ESBL (blood)-05/13/17





Administer Medication


Ertapenem


1 gram IV


q 24 hours


Stop Treatment:  May 24, 2017





Additional Information


Venous access:  PICC Line


Additional Instructions





[x] Peripheral flush and dressing changes per protocol


[x] Implanted port and central :


* Implanted port: 10 ml Normal Saline followed by 5 ml Heparin 100 units/ml 

Heparin flush


   after each use and monthly to maintain.


   [] May leave port accessed during therapy.


   [] May leave peripheral site accessed for duration of therapy.





[x] If patient has SOB or respiratory distress, check oxygen saturation. If 

less than 90% or clinical signs of respiratory distress, administer oxygen at 2 

L/min. via nasal cannula and notify physician.





[x] Anaphylaxis/Reaction orders:


* Stop infusion.


* Keep IV line open with saline flush.


* Notify physician.


* Monitor vital signs every 15 minutes until symptoms resolve.


* Check Oxygen saturation; Oxygen at 2 L/min. via nasal cannula if less than 90%

 or clinical signs of respiratory distress.


* Administer diphenhydramine (Benadryl) 25 mg IV STAT, (unless patient has 

received as pre-med). May repeat once, if necessary.


* Solu-Cortef 250 mg IVP over 30-60 seconds, use 100 mg vials for each 

dissolution.


* Epinephrine (1mg/1 ml) 0.3 mg subcutaneously or IVP now with any signs of 

respiratory distress.


* Check with physician for new additional pre-med orders if patient is re-

challenged or re-treated.


[x] May remove PICC line when treatment complete, after confirming with 

Physician.


[x] If the patient is admitted to the hospital, the ED, or transferred via [facility]

, complete transfer form including medication reconciliation order sheet.





Laboratory Tests


Additional Information


BMP and Liver function test on 5/21/17.


Fax lab work to Dr. Preciado  [phone]








Sincere Preciado MD May 18, 2017 12:52

## 2017-05-18 NOTE — HHI.DS
__________________________________________________





Discharge Summary


Admission Date


May 13, 2017 at 19:21


Discharge Date:  May 19, 2017


Admitting Diagnosis


AMS, sepsis





(1) Sepsis


ICD Code:  A41.9


Diagnosis:  Principal





Procedures


Lumbar and PICC


Brief History - From Admission


72-year-old gentleman brought by his daughter who is complaining patient being 

confused that has been getting progressively worse throughout the day.  She 

states that patient has had some gait disturbances yesterday.  Daughter reports 

subjective fever which she reports giving Tylenol proximally 2 hours ago.  

Denies any nausea, vomiting, loss or change in bowel or bladder, cough, chest 

pain, shortness of breath, or abdominal pain.  Daughter does states that he was 

complaining of a headache earlier today as well as low back pain.  Denies 

patient complaining of anything else.


CBC/BMP:  


5/17/17 0800                                                                   

             5/18/17 0630





Significant Findings





Laboratory Tests








Test 5/16/17 5/17/17





 06:48 08:00


 


Hemoglobin 12.4 GM/DL 12.8 GM/DL





 (13.0-17.0) (13.0-17.0)


 


Hematocrit 36.8 % 38.7 %





 (39.0-51.0) (39.0-51.0)


 


Platelet Count 135 TH/MM3 144 TH/MM3





 (150-450) (150-450)


 


Monocytes (%) (Auto) 10.3 % 14.0 %





 (0.0-8.0) (0.0-8.0)


 


Eosinophils (%) (Auto) 8.0 % (0.0-4.0) 7.6 % (0.0-4.0)


 


Monocytes # (Auto) 1.0 TH/MM3 1.0 TH/MM3





 (0-0.9) (0-0.9)


 


Eosinophils # (Auto) 0.8 TH/MM3 0.6 TH/MM3





 (0-0.4) (0-0.4)


 


Blood Urea Nitrogen 6 MG/DL (7-18) 


 


Random Glucose 65 MG/DL 





 () 


 


Mean Corpuscular Hemoglobin  26.7 PG





  (27.0-34.0)


 


Chloride Level  110 MEQ/L





  ()








Imaging





Last Impressions








Chest X-Ray 5/17/17 0000 Signed





Impressions: 





 Service Date/Time:  Wednesday, May 17, 2017 19:35 - CONCLUSION:  1. 





 Uncomplicated PICC line placement.     Lino Ochoa MD 


 


Head CT 5/13/17 0000 Signed





Impressions: 





 Service Date/Time:  Saturday, May 13, 2017 21:33 - CONCLUSION:  1.  Severe 





 diffuse periventricular and subcortical white matter small vessel ischemic 





 changes bilaterally. 2.  Diffuse cerebral atrophy. 3.  No acute infarct, acute 





 hemorrhage, mass effect or extra-axial fluid collections.     Adam Zambrano MD 








PE at Discharge


GENERAL: Well-developed well-nourished in no distress


SKIN: Warm and dry.


HEAD: Atraumatic. Normocephalic. 


EYES: Pupils equal and round. No scleral icterus. No injection or drainage. 


ENT: No nasal bleeding or discharge.  Mucous membranes pink and moist.


NECK: Trachea midline. No JVD. 


CARDIOVASCULAR: Regular rate and rhythm.  


RESPIRATORY: No accessory muscle use. Clear to auscultation. Breath sounds 

equal bilaterally. 


GASTROINTESTINAL: Abdomen soft, non-tender, nondistended.


MUSCULOSKELETAL: Extremities without clubbing, cyanosis, or edema. No obvious 

deformities. 


NEUROLOGICAL: Awake and alert. No obvious cranial nerve deficits.  Motor 

grossly within normal limits. Five out of 5 muscle strength in the arms and 

legs.  Normal speech.


Hospital Course


Encephalopathy secondary to sepsis.  Improving


- CT head negative


- History of fever and headache however pt denies this at this time. 


- LP shows WBC 11, RBC 1607 and protein 45.6.  ID following and has 

discontinued vanc/amp/acyclovir and switched rocephin IV to meropenem and then 

to IV Invanz through May 24 due to E. Coli ESBL. 


- cultures growing E. Coli ESBL and repeat blood cx and CSF neg to date





Lactic acidosis


resolved. see above





COPD


- No exacerbation


- No steroids indicated


- DuoNeb's when necessary





 Bigeminy/trigeminy.  Replace potassium and magnesium.  If persistent consider 

echocardiogram.   TSH and repeat BMP and magnesium unremarkable.  Continue 

telemetry monitoring





DVT GI prophylaxis


- Subcutaneous heparin


- Pepcid


Discharge Planning


Pt Condition on Discharge:  Stable


Discharge Disposition:  Discharge to SNF


Discharge Time:  > 30 minutes


Discharge Instructions


DIET: Follow Instructions for:  Heart Healthy Diet


Activities you can perform:  Regular-No Restrictions


Activities to Avoid:  Driving


Follow up Referrals:  


PCP Follow-up - 2-3 Days





New Medications:  


Walker with Front Wheels (Walker with Front Wheels) 1 Mis Mis


1 EA .ROUTE AS DIRECTED #1 Ref 0 EA


 


Continued Medications:  


Amlodipine (Amlodipine) 2.5 Mg Tab


2.5 MG PO DAILY Blood Pressure Management #30 Ref 0 TAB


Aspirin DR (Aspirin EC) 81 Mg Tabdr


81 MG PO DAILY Blood Clot Prevention #90 Ref 0 TAB


Atorvastatin (Atorvastatin) 80 Mg Tab


80 MG PO HS Cholesterol Management #30 Ref 0 TAB


Metoprolol Succinate ER 24 HR (Metoprolol Succinate ER 24 HR) 50 Mg Tab


50 MG PO DAILY #30 Ref 0 TAB


Omeprazole (Omeprazole) 40 Mg Cap


40 MG PO DAILY #30 Ref 0 CAP


Sodium Chloride (Sodium Chloride) 1 Gm Tab


1 GM PO TID hyponatremia #30 Ref 1 TAB











Shaq Lama MD May 18, 2017 09:40

## 2017-05-19 VITALS
RESPIRATION RATE: 16 BRPM | DIASTOLIC BLOOD PRESSURE: 70 MMHG | HEART RATE: 57 BPM | SYSTOLIC BLOOD PRESSURE: 118 MMHG | TEMPERATURE: 98 F | OXYGEN SATURATION: 96 %

## 2017-05-19 VITALS
HEART RATE: 54 BPM | TEMPERATURE: 97.3 F | RESPIRATION RATE: 18 BRPM | OXYGEN SATURATION: 97 % | SYSTOLIC BLOOD PRESSURE: 138 MMHG | DIASTOLIC BLOOD PRESSURE: 64 MMHG

## 2017-05-19 VITALS
DIASTOLIC BLOOD PRESSURE: 67 MMHG | TEMPERATURE: 97.6 F | OXYGEN SATURATION: 98 % | HEART RATE: 59 BPM | SYSTOLIC BLOOD PRESSURE: 138 MMHG | RESPIRATION RATE: 18 BRPM

## 2017-05-19 VITALS — HEART RATE: 59 BPM

## 2017-05-19 VITALS
HEART RATE: 54 BPM | SYSTOLIC BLOOD PRESSURE: 111 MMHG | OXYGEN SATURATION: 94 % | TEMPERATURE: 97.3 F | RESPIRATION RATE: 18 BRPM | DIASTOLIC BLOOD PRESSURE: 65 MMHG

## 2017-05-19 RX ADMIN — Medication SCH ML: at 08:49

## 2017-05-19 RX ADMIN — HEPARIN SODIUM SCH UNITS: 10000 INJECTION, SOLUTION INTRAVENOUS; SUBCUTANEOUS at 08:48

## 2017-05-19 RX ADMIN — METOPROLOL SUCCINATE SCH MG: 50 TABLET, FILM COATED, EXTENDED RELEASE ORAL at 08:49

## 2017-05-19 RX ADMIN — CHLORHEXIDINE GLUCONATE SCH PACK: 500 CLOTH TOPICAL at 04:00

## 2017-05-19 RX ADMIN — ASPIRIN SCH MG: 81 TABLET ORAL at 08:49

## 2017-05-19 RX ADMIN — MUPIROCIN CALCIUM SCH APPLIC: 20 OINTMENT TOPICAL at 08:48

## 2017-05-19 RX ADMIN — HEPARIN SODIUM (PORCINE) LOCK FLUSH IV SOLN 100 UNIT/ML SCH UNITS: 100 SOLUTION at 08:49

## 2017-05-19 RX ADMIN — SODIUM CHLORIDE TAB 1 GM SCH GM: 1 TAB at 13:04

## 2017-05-19 RX ADMIN — Medication SCH ML: at 09:00

## 2017-05-19 RX ADMIN — SODIUM CHLORIDE TAB 1 GM SCH GM: 1 TAB at 08:48

## 2017-05-19 RX ADMIN — FAMOTIDINE SCH MG: 20 TABLET, FILM COATED ORAL at 08:49

## 2017-05-19 RX ADMIN — ERTAPENEM SODIUM SCH MLS/HR: 1 INJECTION, POWDER, LYOPHILIZED, FOR SOLUTION INTRAMUSCULAR; INTRAVENOUS at 13:06

## 2017-05-19 NOTE — HHI.PR
Subjective


Remarks


Follow-up sepsis.  He is doing okay.  Discharged on hold pending SNF 

arrangement.  Discussed with RN





Objective


Vitals





 Vital Signs








  Date Time  Temp Pulse Resp B/P Pulse Ox O2 Delivery O2 Flow Rate FiO2


 


5/19/17 08:27 97.3 54 18 111/65 94   


 


5/19/17 08:00  59      


 


5/19/17 04:00  70      


 


5/19/17 04:00 97.3 54 18 138/64 97   


 


5/19/17 00:00 97.6 59 18 138/67 98   


 


5/18/17 20:00 97.3 63 18 144/65 98   


 


5/18/17 16:20 97.9 61 18 136/61 96   


 


5/18/17 12:49 97.3 62 18 135/75 95   








 I/O








 5/18/17 5/18/17 5/18/17 5/19/17 5/19/17 5/19/17





 07:00 15:00 23:00 07:00 15:00 23:00


 


Intake Total 200 ml 810 ml 900 ml 480 ml  


 


Output Total  200 ml   200 ml 


 


Balance 200 ml 610 ml 900 ml 480 ml -200 ml 


 


      


 


Intake Oral 200 ml 600 ml  480 ml  


 


IV Total  210 ml 900 ml   


 


Output Urine Total  200 ml   200 ml 


 


# Voids 2 2  6  


 


# Bowel Movements 0 0    








Result Diagram:  


5/17/17 0800                                                                   

             5/18/17 0630





Imaging





Last Impressions








Chest X-Ray 5/17/17 0000 Signed





Impressions: 





 Service Date/Time:  Wednesday, May 17, 2017 19:35 - CONCLUSION:  1. 





 Uncomplicated PICC line placement.     Lino Ochoa MD 


 


Head CT 5/13/17 0000 Signed





Impressions: 





 Service Date/Time:  Saturday, May 13, 2017 21:33 - CONCLUSION:  1.  Severe 





 diffuse periventricular and subcortical white matter small vessel ischemic 





 changes bilaterally. 2.  Diffuse cerebral atrophy. 3.  No acute infarct, acute 





 hemorrhage, mass effect or extra-axial fluid collections.     Adam Zambrano MD 








Objective Remarks


GENERAL: Well-developed well-nourished in no distress


SKIN: Warm and dry.


HEAD: Atraumatic. Normocephalic. 


EYES: Pupils equal and round. No scleral icterus. No injection or drainage. 


ENT: No nasal bleeding or discharge.  Mucous membranes pink and moist.


NECK: Trachea midline. No JVD. 


CARDIOVASCULAR: Regular rate and rhythm.  


RESPIRATORY: No accessory muscle use. Clear to auscultation. Breath sounds 

equal bilaterally. 


GASTROINTESTINAL: Abdomen soft, non-tender, nondistended.


MUSCULOSKELETAL: Extremities without clubbing, cyanosis, or edema. No obvious 

deformities. 


NEUROLOGICAL: Awake and alert. No obvious cranial nerve deficits.  Motor 

grossly within normal limits. Five out of 5 muscle strength in the arms and 

legs.  N


Procedures


Lumbar and PICC





A/P


Problem List:  


(1) Sepsis


ICD Code:  A41.9


Status:  Acute


Assessment and Plan


Encephalopathy secondary to sepsis.  Improving


- CT head negative


- History of fever and headache however pt denies this at this time. 


- LP shows WBC 11, RBC 1607 and protein 45.6.  ID following and has 

discontinued vanc/amp/acyclovir and switched rocephin IV to meropenem and then 

to IV Invanz through May 24 due to E. Coli ESBL. 


- cultures growing E. Coli ESBL and repeat blood cx and CSF neg to date





Lactic acidosis


resolved. see above





COPD


- No exacerbation


- No steroids indicated


- DuoNeb's when necessary





 Bigeminy/trigeminy.  Replace potassium and magnesium.  If persistent consider 

echocardiogram.   TSH and repeat BMP and magnesium unremarkable.  Continue 

telemetry monitoring





DVT GI prophylaxis


- Subcutaneous heparin


- Pepcid


Discharge Planning


Stable for discharge





Problem Qualifiers





(1) Sepsis:  


Qualified Code:  A41.9 - Sepsis, due to unspecified organism





Shaq Lama MD May 19, 2017 10:49

## 2017-06-02 ENCOUNTER — HOSPITAL ENCOUNTER (EMERGENCY)
Dept: HOSPITAL 17 - NEPC | Age: 72
Discharge: NURSING, REHAB OR LONG-TERM CARE | End: 2017-06-02
Payer: COMMERCIAL

## 2017-06-02 VITALS
SYSTOLIC BLOOD PRESSURE: 122 MMHG | RESPIRATION RATE: 18 BRPM | DIASTOLIC BLOOD PRESSURE: 77 MMHG | OXYGEN SATURATION: 100 % | HEART RATE: 87 BPM

## 2017-06-02 VITALS — HEIGHT: 69 IN | WEIGHT: 123.46 LBS | BODY MASS INDEX: 18.29 KG/M2

## 2017-06-02 VITALS
RESPIRATION RATE: 19 BRPM | SYSTOLIC BLOOD PRESSURE: 123 MMHG | DIASTOLIC BLOOD PRESSURE: 78 MMHG | HEART RATE: 83 BPM | OXYGEN SATURATION: 96 %

## 2017-06-02 VITALS
TEMPERATURE: 96.8 F | HEART RATE: 73 BPM | RESPIRATION RATE: 20 BRPM | DIASTOLIC BLOOD PRESSURE: 59 MMHG | OXYGEN SATURATION: 99 % | SYSTOLIC BLOOD PRESSURE: 115 MMHG

## 2017-06-02 VITALS
OXYGEN SATURATION: 99 % | RESPIRATION RATE: 20 BRPM | DIASTOLIC BLOOD PRESSURE: 59 MMHG | TEMPERATURE: 96.8 F | HEART RATE: 78 BPM | SYSTOLIC BLOOD PRESSURE: 115 MMHG

## 2017-06-02 VITALS — RESPIRATION RATE: 19 BRPM | OXYGEN SATURATION: 100 %

## 2017-06-02 DIAGNOSIS — F03.90: Primary | ICD-10-CM

## 2017-06-02 LAB
ALP SERPL-CCNC: 139 U/L (ref 45–117)
ALT SERPL-CCNC: 24 U/L (ref 12–78)
ANION GAP SERPL CALC-SCNC: 7 MEQ/L (ref 5–15)
AST SERPL-CCNC: 41 U/L (ref 15–37)
BACTERIA #/AREA URNS HPF: (no result) /HPF
BASOPHILS # BLD AUTO: 0.1 TH/MM3 (ref 0–0.2)
BASOPHILS NFR BLD: 1 % (ref 0–2)
BILIRUB SERPL-MCNC: 0.6 MG/DL (ref 0.2–1)
BUN SERPL-MCNC: 17 MG/DL (ref 7–18)
CHLORIDE SERPL-SCNC: 110 MEQ/L (ref 98–107)
COLOR UR: YELLOW
COMMENT (UR): (no result)
CULTURE IF INDICATED: (no result)
EOSINOPHIL # BLD: 0.3 TH/MM3 (ref 0–0.4)
EOSINOPHIL NFR BLD: 5 % (ref 0–4)
ERYTHROCYTE [DISTWIDTH] IN BLOOD BY AUTOMATED COUNT: 16 % (ref 11.6–17.2)
GFR SERPLBLD BASED ON 1.73 SQ M-ARVRAT: 100 ML/MIN (ref 89–?)
GLUCOSE UR STRIP-MCNC: (no result) MG/DL
HCO3 BLD-SCNC: 27 MEQ/L (ref 21–32)
HCT VFR BLD CALC: 33.9 % (ref 39–51)
HEMO FLAGS: (no result)
HGB UR QL STRIP: (no result)
HYALINE CASTS #/AREA URNS LPF: 1 /LPF
KETONES UR STRIP-MCNC: (no result) MG/DL
LYMPHOCYTES # BLD AUTO: 2.1 TH/MM3 (ref 1–4.8)
LYMPHOCYTES NFR BLD AUTO: 31.2 % (ref 9–44)
MCH RBC QN AUTO: 26.8 PG (ref 27–34)
MCHC RBC AUTO-ENTMCNC: 33.1 % (ref 32–36)
MCV RBC AUTO: 80.8 FL (ref 80–100)
MONOCYTES NFR BLD: 12.6 % (ref 0–8)
MUCOUS THREADS #/AREA URNS LPF: (no result) /LPF
NEUTROPHILS # BLD AUTO: 3.4 TH/MM3 (ref 1.8–7.7)
NEUTROPHILS NFR BLD AUTO: 50.2 % (ref 16–70)
NITRITE UR QL STRIP: (no result)
PLATELET # BLD: 215 TH/MM3 (ref 150–450)
POTASSIUM SERPL-SCNC: 3.7 MEQ/L (ref 3.5–5.1)
RBC # BLD AUTO: 4.19 MIL/MM3 (ref 4.5–5.9)
SODIUM SERPL-SCNC: 144 MEQ/L (ref 136–145)
SP GR UR STRIP: 1.01 (ref 1–1.03)
SQUAMOUS #/AREA URNS HPF: <1 /HPF (ref 0–5)
WBC # BLD AUTO: 6.8 TH/MM3 (ref 4–11)

## 2017-06-02 PROCEDURE — P9612 CATHETERIZE FOR URINE SPEC: HCPCS

## 2017-06-02 PROCEDURE — 73100 X-RAY EXAM OF WRIST: CPT

## 2017-06-02 PROCEDURE — 84443 ASSAY THYROID STIM HORMONE: CPT

## 2017-06-02 PROCEDURE — L3908 WHO COCK-UP NONMOLDE PRE OTS: HCPCS

## 2017-06-02 PROCEDURE — 99285 EMERGENCY DEPT VISIT HI MDM: CPT

## 2017-06-02 PROCEDURE — 80053 COMPREHEN METABOLIC PANEL: CPT

## 2017-06-02 PROCEDURE — 85025 COMPLETE CBC W/AUTO DIFF WBC: CPT

## 2017-06-02 PROCEDURE — 81001 URINALYSIS AUTO W/SCOPE: CPT

## 2017-06-02 PROCEDURE — 70450 CT HEAD/BRAIN W/O DYE: CPT

## 2017-06-02 PROCEDURE — 87086 URINE CULTURE/COLONY COUNT: CPT

## 2017-06-02 NOTE — RADRPT
EXAM DATE/TIME:  06/02/2017 16:07 

 

[facility] COMPARISON:     CT BRAIN W/O CONTRAST, May 13, 2017, 21:33.

 

INDICATIONS :     Altered mental status. 

                      

RADIATION DOSE:     51.33 CTDIvol (mGy) 

 

 

MEDICAL HISTORY :     Cardiovascular disease. Hypertension. Diabetes mellitus type 2.

SURGICAL HISTORY :      None. 

ENCOUNTER:      Initial

ACUITY:      1 day

PAIN SCALE:      0/10

LOCATION:       Cranial 

 

TECHNIQUE:     Multiple contiguous axial images were obtained of the head.  Using automated exposure 
control and adjustment of the mA and/or kV according to patient size, radiation dose was kept as low 
as reasonably achievable to obtain optimal diagnostic quality images. 

 

FINDINGS:     There is moderate central and cortical atrophy with dilatation of the ventricular and s
ulcal spaces.  There is no parenchymal hemorrhage, acute infarction or mass lesion.  There are no ext
ra-axial fluid collections appreciated.  Posterior fossa is unremarkable. 

 

CONCLUSION:     Central and cortical atrophy otherwise negative. 

 

 Quincy Caldwell MD FACR on June 02, 2017 at 16:29                

Board Certified Radiologist.

 This report was verified electronically.

## 2017-06-02 NOTE — PD
HPI


Chief Complaint:  Altered Mental Status


Time Seen by Provider:  15:19


Travel History


International Travel<30 days:  No


Contact w/Intl Traveler<30days:  No


Traveled to known affect area:  No





History of Present Illness


HPI


This is a nursing home resident who was put in the nursing home due to altered 

mental status.  Apparently the staff felt he was more altered than usual and 

sent to the ER for evaluation of that.  No fevers.  He is not able to provide 

any useful history or review of systems.  He voices no complaint.  He denies 

any pain.





PFSH


Past Medical History


Arthritis:  Yes


Asthma:  Yes


Cancer:  No


Cardiovascular Problems:  Yes


High Cholesterol:  Yes


Chest Pain:  Yes


COPD:  Yes


Diabetes:  Yes


Patient Takes Glucophage:  No


Diminished Hearing:  No


Gastrointestinal Disorders:  No


Glaucoma:  Yes


Genitourinary:  Yes


Hypertension:  Yes


Immune Disorder:  No


Insomnia:  Yes


Musculoskeletal:  Yes


Neurologic:  No


Psychiatric:  No


Respiratory:  Yes (COPD)


Pregnant?:  Not Pregnant





Past Surgical History


Surgical History:  Unable to Obtain


Ear Surgery:  Yes (BILATERAL CATARACT REMOVAL)


Eye Surgery:  Yes (CATARACT REMOVAL)





Social History


Alcohol Use:  No


Tobacco Use:  No


Substance Use:  No





Allergies-Medications


(Allergen,Severity, Reaction):  


Coded Allergies:  


     Contrast Media (Verified  Allergy, Mild, HIVES, ITCHING, 6/2/17)


     *MDRO Multi-Drug Resistant Organism (Verified  Adverse Reaction, Unknown, 6 /2/17)


 MRSA PCR screen positive-05/13/17


 ESBL (blood)-05/13/17


Reported Meds & Prescriptions





Reported Meds & Active Scripts


Active


Walker with Front Wheels (Device) 1 Mis Mis 1 Ea .ROUTE AS DIRECTED


Sodium Chloride 1 Gm Tab 1 Gm PO TID


Aspirin EC (Aspirin) 81 Mg Tabdr 81 Mg PO DAILY


Potassium Chloride Microencaps 20 Meq Tab 20 Meq PO Q12HR 10 Days


Reported


Metoprolol Succinate ER 24 HR (Metoprolol Succinate) 50 Mg Tab 50 Mg PO DAILY


Lorazepam 0.5 Mg Tab 0.5 Mg PO DAILY PRN


Cetirizine (Cetirizine HCl) 10 Mg Tab 10 Mg PO DAILY


Amlodipine (Amlodipine Besylate) 2.5 Mg Tab 2.5 Mg PO DAILY


Omeprazole 40 Mg Cap 40 Mg PO DAILY


Atorvastatin (Atorvastatin Calcium) 80 Mg Tab 80 Mg PO HS








Review of Systems


ROS Limitations:  Clinical Condition, Altered Mental Status, Poor Historian





Physical Exam


Narrative


GENERAL: Thin elderly confused patient in no apparent distress.


SKIN: Focused skin assessment reveals no rash and nodules. Skin is Warm and dry.


HEAD: Atraumatic. Normocephalic. 


EYES: Pupils equal and round. No scleral icterus. No injection or drainage. 


ENT: No nasal bleeding or discharge.  Mucous membranes pink and moist.


NECK: Trachea midline. No JVD.  No meningeal signs


CARDIOVASCULAR: Regular rate and rhythm.  No murmur appreciated.


RESPIRATORY: No accessory muscle use. Clear to auscultation. Breath sounds 

equal bilaterally. 


GASTROINTESTINAL: Abdomen soft, non-tender, nondistended. Hepatic and splenic 

margins not palpable. 


MUSCULOSKELETAL: No obvious deformities. No clubbing.  No cyanosis.  No edema. 


NEUROLOGICAL: Awake and looking around.  He knows his name and birthdate. no 

obvious cranial nerve deficits.  Difficult to gauge motor strength or sensation 

as he does not really participate in the exam .  Understandable speech without 

slurring


PSYCHIATRIC: Cooperative and relaxed  mood and affect; insight and judgment 

poor .





Data


Data


Last Documented VS





Vital Signs








  Date Time  Temp Pulse Resp B/P Pulse Ox O2 Delivery O2 Flow Rate FiO2


 


6/2/17 17:11  83 19 123/78 96 Room Air  


 


6/2/17 15:25 96.8       








Orders





 Complete Blood Count With Diff (6/2/17 15:33)


Comprehensive Metabolic Panel (6/2/17 15:33)


Thyroid Stimulating Hormone (6/2/17 15:33)


Urinalysis - C+S If Indicated (6/2/17 15:33)


Ct Brain W/O Iv Contrast(Rout) (6/2/17 15:33)


Blood Glucose (6/2/17 15:33)


Ecg Monitoring (6/2/17 15:33)


Iv Access Insert/Monitor (6/2/17 15:33)


Cath For Specimen (6/2/17 15:33)


Oximetry (6/2/17 15:33)


Sodium Chloride 0.9% Flush (Ns Flush) (6/2/17 15:45)


Wrist, One View (6/2/17 )


Urine Culture (6/2/17 15:45)





Labs








 Laboratory Tests








Test 6/2/17





 15:45


 


White Blood Count 6.8 TH/MM3


 


Red Blood Count 4.19 MIL/MM3


 


Hemoglobin 11.2 GM/DL


 


Hematocrit 33.9 %


 


Mean Corpuscular Volume 80.8 FL


 


Mean Corpuscular Hemoglobin 26.8 PG


 


Mean Corpuscular Hemoglobin 33.1 %





Concent 


 


Red Cell Distribution Width 16.0 %


 


Platelet Count 215 TH/MM3


 


Mean Platelet Volume 8.6 FL


 


Neutrophils (%) (Auto) 50.2 %


 


Lymphocytes (%) (Auto) 31.2 %


 


Monocytes (%) (Auto) 12.6 %


 


Eosinophils (%) (Auto) 5.0 %


 


Basophils (%) (Auto) 1.0 %


 


Neutrophils # (Auto) 3.4 TH/MM3


 


Lymphocytes # (Auto) 2.1 TH/MM3


 


Monocytes # (Auto) 0.8 TH/MM3


 


Eosinophils # (Auto) 0.3 TH/MM3


 


Basophils # (Auto) 0.1 TH/MM3


 


CBC Comment DIFF FINAL 


 


Differential Comment  


 


Urine Color YELLOW 


 


Urine Turbidity CLEAR 


 


Urine pH 5.0 


 


Urine Specific Gravity 1.012 


 


Urine Protein TRACE mg/dL


 


Urine Glucose (UA) NEG mg/dL


 


Urine Ketones NEG mg/dL


 


Urine Occult Blood NEG 


 


Urine Nitrite NEG 


 


Urine Bilirubin NEG 


 


Urine Urobilinogen LESS THAN 2.0





 MG/DL


 


Urine Leukocyte Esterase NEG 


 


Urine RBC LESS THAN 1





 /hpf


 


Urine WBC 4 /hpf


 


Urine WBC Clumps RARE 


 


Urine Squamous Epithelial <1 /hpf





Cells 


 


Urine Bacteria MOD /hpf


 


Urine Hyaline Casts 1 /lpf


 


Urine Mucus FEW /lpf


 


Microscopic Urinalysis Comment CATH-CULTURE





 IND


 


Sodium Level 144 MEQ/L


 


Potassium Level 3.7 MEQ/L


 


Chloride Level 110 MEQ/L


 


Carbon Dioxide Level 27.0 MEQ/L


 


Anion Gap 7 MEQ/L


 


Blood Urea Nitrogen 17 MG/DL


 


Creatinine 0.90 MG/DL


 


Estimat Glomerular Filtration 100 ML/MIN





Rate 


 


Random Glucose 123 MG/DL


 


Calcium Level 9.0 MG/DL


 


Total Bilirubin 0.6 MG/DL


 


Aspartate Amino Transf 41 U/L





(AST/SGOT) 


 


Alanine Aminotransferase 24 U/L





(ALT/SGPT) 


 


Alkaline Phosphatase 139 U/L


 


Total Protein 7.6 GM/DL


 


Albumin 3.3 GM/DL


 


Thyroid Stimulating Hormone 3.300 uIU/ML





3rd Gen 














[facility]


Medical Decision Making


Medical Screen Exam Complete:  Yes


Emergency Medical Condition:  Yes


Medical Record Reviewed:  Yes


Differential Diagnosis


Psychiatric problem, electrolyte abnormality, intracranial lesion


Narrative Course


I have reviewed the patient's electronic medical record.  Patient was 

hospitalized for altered mental status and discharged 2 weeks ago to the 

nursing home





IV placed


CBC shows minimal anemia


Metabolic profile is normal


LFTs are normal


TSH is normal


Catheterized urine is clean


Brain CT shows some microvascular ischemic change but nothing acute


Patient seen be favoring his wrist a bit on the right side so I ordered an x-

ray to evaluated.  Clinically there is no sign of infection or inflammation 

there.  Patient cannot really tolerate x-rays well and only one view was 

obtained shows no fracture





I doubt he is much different than his baseline.  I don't see any objective 

evidence of anything treatable regarding his mental status.  Mostly he is calm 

and cooperative but occasionally gets agitated.  He appears to have dementia 

with agitation.  Stable for return to nursing home


I don't see any indication for an acute hospital stay





Diagnosis





 Primary Impression:  


 Altered mental status, unspecified


 Additional Impression:  


 Dementia


 Qualified Code:  F03.90 - Dementia without behavioral disturbance, unspecified 

dementia type


Disposition:  03 DISCHARGE TO SNF


Condition:  Stable








Jose Rocha MD Jun 2, 2017 15:41

## 2017-06-02 NOTE — RADRPT
EXAM DATE/TIME:  06/02/2017 16:47 

 

[facility] COMPARISON:     

No previous studies available for comparison.

 

                     

INDICATIONS :     

Right painrist pain

                     

 

MEDICAL HISTORY :            

Dementia   

 

SURGICAL HISTORY :        

Unknown

 

ENCOUNTER:     

Initial                                        

 

ACUITY:     

1 day      

 

PAIN SCORE:     

Non-responsive.

 

LOCATION:     

Right  one view wrist

 

FINDINGS:     

The single AP view of the wrist. No evidence of fracture. Alignment within normal limits. Small osteo
phytes at the radiocarpal joint.

 

CONCLUSION:     

1. No evidence of fracture.

2. Mild osteoarthritic findings. 

 

 

 Ole Tovar MD on June 02, 2017 at 17:28           

Board Certified Radiologist.

 This report was verified electronically.

## 2017-12-07 ENCOUNTER — HOSPITAL ENCOUNTER (INPATIENT)
Dept: HOSPITAL 17 - NEPC | Age: 72
LOS: 6 days | Discharge: NURSING, REHAB OR LONG-TERM CARE | DRG: 871 | End: 2017-12-13
Attending: HOSPITALIST | Admitting: HOSPITALIST
Payer: COMMERCIAL

## 2017-12-07 VITALS
RESPIRATION RATE: 24 BRPM | HEART RATE: 115 BPM | DIASTOLIC BLOOD PRESSURE: 78 MMHG | OXYGEN SATURATION: 96 % | SYSTOLIC BLOOD PRESSURE: 119 MMHG

## 2017-12-07 VITALS
RESPIRATION RATE: 23 BRPM | SYSTOLIC BLOOD PRESSURE: 117 MMHG | OXYGEN SATURATION: 96 % | HEART RATE: 120 BPM | DIASTOLIC BLOOD PRESSURE: 82 MMHG

## 2017-12-07 VITALS
TEMPERATURE: 98.5 F | HEART RATE: 105 BPM | SYSTOLIC BLOOD PRESSURE: 122 MMHG | RESPIRATION RATE: 23 BRPM | OXYGEN SATURATION: 95 % | DIASTOLIC BLOOD PRESSURE: 81 MMHG

## 2017-12-07 VITALS
DIASTOLIC BLOOD PRESSURE: 79 MMHG | OXYGEN SATURATION: 96 % | RESPIRATION RATE: 20 BRPM | SYSTOLIC BLOOD PRESSURE: 151 MMHG | HEART RATE: 104 BPM

## 2017-12-07 VITALS
HEART RATE: 107 BPM | SYSTOLIC BLOOD PRESSURE: 117 MMHG | RESPIRATION RATE: 22 BRPM | OXYGEN SATURATION: 96 % | DIASTOLIC BLOOD PRESSURE: 82 MMHG | TEMPERATURE: 99.5 F

## 2017-12-07 VITALS
DIASTOLIC BLOOD PRESSURE: 87 MMHG | SYSTOLIC BLOOD PRESSURE: 153 MMHG | HEART RATE: 113 BPM | RESPIRATION RATE: 22 BRPM | OXYGEN SATURATION: 95 %

## 2017-12-07 VITALS — HEIGHT: 71 IN | BODY MASS INDEX: 17.41 KG/M2 | WEIGHT: 124.34 LBS

## 2017-12-07 VITALS
OXYGEN SATURATION: 96 % | HEART RATE: 112 BPM | DIASTOLIC BLOOD PRESSURE: 82 MMHG | RESPIRATION RATE: 24 BRPM | SYSTOLIC BLOOD PRESSURE: 117 MMHG | TEMPERATURE: 99.5 F

## 2017-12-07 VITALS — OXYGEN SATURATION: 95 %

## 2017-12-07 VITALS — RESPIRATION RATE: 24 BRPM | OXYGEN SATURATION: 98 %

## 2017-12-07 DIAGNOSIS — J44.1: ICD-10-CM

## 2017-12-07 DIAGNOSIS — H40.9: ICD-10-CM

## 2017-12-07 DIAGNOSIS — R40.2432: ICD-10-CM

## 2017-12-07 DIAGNOSIS — R64: ICD-10-CM

## 2017-12-07 DIAGNOSIS — E87.2: ICD-10-CM

## 2017-12-07 DIAGNOSIS — Z78.1: ICD-10-CM

## 2017-12-07 DIAGNOSIS — A41.9: Primary | ICD-10-CM

## 2017-12-07 DIAGNOSIS — F03.90: ICD-10-CM

## 2017-12-07 DIAGNOSIS — M19.90: ICD-10-CM

## 2017-12-07 DIAGNOSIS — G47.00: ICD-10-CM

## 2017-12-07 DIAGNOSIS — I95.9: ICD-10-CM

## 2017-12-07 DIAGNOSIS — F41.9: ICD-10-CM

## 2017-12-07 DIAGNOSIS — E11.9: ICD-10-CM

## 2017-12-07 DIAGNOSIS — G93.41: ICD-10-CM

## 2017-12-07 DIAGNOSIS — F17.210: ICD-10-CM

## 2017-12-07 DIAGNOSIS — E78.00: ICD-10-CM

## 2017-12-07 DIAGNOSIS — I10: ICD-10-CM

## 2017-12-07 DIAGNOSIS — Z99.81: ICD-10-CM

## 2017-12-07 DIAGNOSIS — J96.90: ICD-10-CM

## 2017-12-07 LAB
ALP SERPL-CCNC: 189 U/L (ref 45–117)
ALT SERPL-CCNC: 22 U/L (ref 12–78)
ANION GAP SERPL CALC-SCNC: 3 MEQ/L (ref 5–15)
AST SERPL-CCNC: 25 U/L (ref 15–37)
BACTERIA #/AREA URNS HPF: (no result) /HPF
BASE EXCESS BLD CALC-SCNC: 6.7 MMOL/L (ref -2–2)
BASOPHILS # BLD AUTO: 0.1 TH/MM3 (ref 0–0.2)
BASOPHILS NFR BLD: 1 % (ref 0–2)
BENZODIAZEPINES PNL UR: 96 % (ref 90–100)
BILIRUB SERPL-MCNC: 0.3 MG/DL (ref 0.2–1)
BLOOD GAS CARBOXYHEMOGLOBIN: 0.9 % (ref 0–4)
BLOOD GAS HCO3: 33 MMOL/L (ref 22–26)
BLOOD GAS OXYGEN CONTENT: 17.3 VOL % (ref 12–20)
BLOOD GAS PCO2: 65 MMHG (ref 38–42)
BUN SERPL-MCNC: 9 MG/DL (ref 7–18)
CHLORIDE SERPL-SCNC: 97 MEQ/L (ref 98–107)
CK SERPL-CCNC: 225 U/L (ref 39–308)
COLOR UR: YELLOW
COMMENT (UR): (no result)
CRITICAL VALUE: YES
CULTURE IF INDICATED: (no result)
DRAW SITE: (no result)
EOSINOPHIL # BLD: 4.1 TH/MM3 (ref 0–0.4)
EOSINOPHIL NFR BLD: 45.6 % (ref 0–4)
ERYTHROCYTE [DISTWIDTH] IN BLOOD BY AUTOMATED COUNT: 17.2 % (ref 11.6–17.2)
GFR SERPLBLD BASED ON 1.73 SQ M-ARVRAT: 97 ML/MIN (ref 89–?)
GLUCOSE UR STRIP-MCNC: (no result) MG/DL
HCO3 BLD-SCNC: 32.8 MEQ/L (ref 21–32)
HCT VFR BLD CALC: 42.1 % (ref 39–51)
HEMO FLAGS: (no result)
HGB UR QL STRIP: (no result)
HYALINE CASTS #/AREA URNS LPF: 3 /LPF
INR PPP: 1.1 RATIO
KETONES UR STRIP-MCNC: (no result) MG/DL
LACTIC ACID GHOST: (no result)
LITER FLOW: 2 L/M
LYMPHOCYTES # BLD AUTO: 1.5 TH/MM3 (ref 1–4.8)
LYMPHOCYTES NFR BLD AUTO: 16.8 % (ref 9–44)
MCH RBC QN AUTO: 26.7 PG (ref 27–34)
MCHC RBC AUTO-ENTMCNC: 32.5 % (ref 32–36)
MCV RBC AUTO: 82.1 FL (ref 80–100)
METHGB MFR BLDA: 0.4 % (ref 0–2)
MONOCYTES NFR BLD: 7.8 % (ref 0–8)
MUCOUS THREADS #/AREA URNS LPF: (no result) /LPF
NEUTROPHILS # BLD AUTO: 2.6 TH/MM3 (ref 1.8–7.7)
NEUTROPHILS NFR BLD AUTO: 28.8 % (ref 16–70)
NITRITE UR QL STRIP: (no result)
NUMBER OF ARTERIAL PUNCTURES: 2
OXYGEN DEVICE: (no result)
PLATELET # BLD: 165 TH/MM3 (ref 150–450)
PO2 BLD: 95 MMHG (ref 61–120)
POTASSIUM SERPL-SCNC: 4.5 MEQ/L (ref 3.5–5.1)
PROTHROMBIN TIME: 11.1 SEC (ref 9.8–11.6)
RBC # BLD AUTO: 5.13 MIL/MM3 (ref 4.5–5.9)
SALICYLATES SERPL-MCNC: 12.8 G/DL (ref 12–16)
SODIUM SERPL-SCNC: 133 MEQ/L (ref 136–145)
SP GR UR STRIP: 1.02 (ref 1–1.03)
STAT: YES
TEMP CORR TO: 37
WBC # BLD AUTO: 9 TH/MM3 (ref 4–11)

## 2017-12-07 PROCEDURE — 51702 INSERT TEMP BLADDER CATH: CPT

## 2017-12-07 PROCEDURE — 83735 ASSAY OF MAGNESIUM: CPT

## 2017-12-07 PROCEDURE — 83605 ASSAY OF LACTIC ACID: CPT

## 2017-12-07 PROCEDURE — 87449 NOS EACH ORGANISM AG IA: CPT

## 2017-12-07 PROCEDURE — 74176 CT ABD & PELVIS W/O CONTRAST: CPT

## 2017-12-07 PROCEDURE — 70450 CT HEAD/BRAIN W/O DYE: CPT

## 2017-12-07 PROCEDURE — 87804 INFLUENZA ASSAY W/OPTIC: CPT

## 2017-12-07 PROCEDURE — 84443 ASSAY THYROID STIM HORMONE: CPT

## 2017-12-07 PROCEDURE — 83036 HEMOGLOBIN GLYCOSYLATED A1C: CPT

## 2017-12-07 PROCEDURE — 84100 ASSAY OF PHOSPHORUS: CPT

## 2017-12-07 PROCEDURE — 85610 PROTHROMBIN TIME: CPT

## 2017-12-07 PROCEDURE — 82550 ASSAY OF CK (CPK): CPT

## 2017-12-07 PROCEDURE — 96374 THER/PROPH/DIAG INJ IV PUSH: CPT

## 2017-12-07 PROCEDURE — 84484 ASSAY OF TROPONIN QUANT: CPT

## 2017-12-07 PROCEDURE — 87040 BLOOD CULTURE FOR BACTERIA: CPT

## 2017-12-07 PROCEDURE — 85025 COMPLETE CBC W/AUTO DIFF WBC: CPT

## 2017-12-07 PROCEDURE — 81001 URINALYSIS AUTO W/SCOPE: CPT

## 2017-12-07 PROCEDURE — 84439 ASSAY OF FREE THYROXINE: CPT

## 2017-12-07 PROCEDURE — 82948 REAGENT STRIP/BLOOD GLUCOSE: CPT

## 2017-12-07 PROCEDURE — 80202 ASSAY OF VANCOMYCIN: CPT

## 2017-12-07 PROCEDURE — 82805 BLOOD GASES W/O2 SATURATION: CPT

## 2017-12-07 PROCEDURE — 84132 ASSAY OF SERUM POTASSIUM: CPT

## 2017-12-07 PROCEDURE — 87641 MR-STAPH DNA AMP PROBE: CPT

## 2017-12-07 PROCEDURE — 87086 URINE CULTURE/COLONY COUNT: CPT

## 2017-12-07 PROCEDURE — 80053 COMPREHEN METABOLIC PANEL: CPT

## 2017-12-07 PROCEDURE — 93005 ELECTROCARDIOGRAM TRACING: CPT

## 2017-12-07 PROCEDURE — 94664 DEMO&/EVAL PT USE INHALER: CPT

## 2017-12-07 PROCEDURE — 83880 ASSAY OF NATRIURETIC PEPTIDE: CPT

## 2017-12-07 PROCEDURE — 94640 AIRWAY INHALATION TREATMENT: CPT

## 2017-12-07 PROCEDURE — 36600 WITHDRAWAL OF ARTERIAL BLOOD: CPT

## 2017-12-07 PROCEDURE — 71010: CPT

## 2017-12-07 RX ADMIN — PHENYTOIN SODIUM SCH MLS/HR: 50 INJECTION INTRAMUSCULAR; INTRAVENOUS at 22:30

## 2017-12-07 RX ADMIN — IPRATROPIUM BROMIDE AND ALBUTEROL SULFATE SCH AMPULE: .5; 3 SOLUTION RESPIRATORY (INHALATION) at 20:10

## 2017-12-07 RX ADMIN — PHENYTOIN SODIUM SCH MLS/HR: 50 INJECTION INTRAMUSCULAR; INTRAVENOUS at 21:30

## 2017-12-07 RX ADMIN — IPRATROPIUM BROMIDE AND ALBUTEROL SULFATE SCH AMPULE: .5; 3 SOLUTION RESPIRATORY (INHALATION) at 23:43

## 2017-12-07 RX ADMIN — IPRATROPIUM BROMIDE AND ALBUTEROL SULFATE SCH AMPULE: .5; 3 SOLUTION RESPIRATORY (INHALATION) at 20:11

## 2017-12-07 NOTE — HHI.HP
HPI


Service


Critical Care Medicine


Primary Care Physician


Unknown


Admission Diagnosis





altered mental status, respiratory distress, sepsis


Diagnosis:  


Travel History


International Travel<30 Days:  No


Contact w/Intl Traveler <30 Da:  No


Traveled to Known Affected Are:  No


History of Present Illness


72-year-old male came to the emergency room sent from the nursing home with 

altered mental status this progressively worsening over past couple days.  He 

was found today by nursing home staff to be unresponsive and in respiratory 

distress.  EMS was called.  Upon arrival they noticed that patient was 

wheezing.  Patient was given albuterol nebulizer and then was brought here.  

His oxygen saturation initially was in the high 80s on 2 L of oxygen via nasal 

cannula that's his daily requirement.  After the nebulizer oxygen saturation 

improved to 95% on 2 L.  Patient arrived with a GCS of 8.  Patient is a full 

code.  After nebulizer treatment and improve oxygenation his mental status 

thoughts improving.  Patient is able to protect his airways.





Review of Systems


ROS


Unable to obtain due to patient's altered mental state





Past Family Social History


Allergies:  


Coded Allergies:  


     diatrizoate meglumine (Unverified  Allergy, Mild, HIVES, ITCHING, 8/15/17)


     gadobenic acid (Unverified  Allergy, Mild, HIVES, ITCHING, 8/15/17)


     gadodiamide (Unverified  Allergy, Mild, HIVES, ITCHING, 8/15/17)


     gadoteridol (Unverified  Allergy, Mild, HIVES, ITCHING, 8/15/17)


     iodixanol (Unverified  Allergy, Mild, HIVES, ITCHING, 8/15/17)


     iohexol (Unverified  Allergy, Mild, HIVES, ITCHING, 8/15/17)


     *MDRO Multi-Drug Resistant Organism (Verified  Adverse Reaction, Unknown, )


 MRSA PCR screen positive-17


 ESBL (blood)-17


Past Medical History


Asthma


COPD


Glaucoma


Hypertension


Past Surgical History


Cataract surgery


Reported Medications





Reported Meds & Active Scripts


Active


Walker with Front Wheels (Device) 1 Mis Mis 1 Ea .ROUTE AS DIRECTED


Sodium Chloride 1 Gm Tab 1 Gm PO TID


Aspirin EC (Aspirin) 81 Mg Tabdr 81 Mg PO DAILY


Potassium Chloride Microencaps 20 Meq Tab 20 Meq PO Q12HR 10 Days


Reported


Metoprolol Succinate ER 24 HR (Metoprolol Succinate) 50 Mg Tab 50 Mg PO DAILY


Lorazepam 0.5 Mg Tab 0.5 Mg PO DAILY PRN


Cetirizine (Cetirizine HCl) 10 Mg Tab 10 Mg PO DAILY


Amlodipine (Amlodipine Besylate) 2.5 Mg Tab 2.5 Mg PO DAILY


Omeprazole 40 Mg Cap 40 Mg PO DAILY


Atorvastatin (Atorvastatin Calcium) 80 Mg Tab 80 Mg PO HS


Active Ordered Medications





Current Medications








 Medications


  (Trade)  Dose


 Ordered  Sig/Kaia


 Route


 PRN Reason  Start Time


 Stop Time Status Last Admin


Dose Admin


 


 Aspirin


  (Ecotrin Ec)  81 mg  DAILY


 PO


   17 09:00


     


 


 


 Atorvastatin


 Calcium


  (Lipitor)  80 mg  HS


 PO


   17 21:00


     


 


 


 Cetirizine HCl


  (ZyrTEC)  10 mg  DAILY


 PO


   17 09:00


     


 


 


 Lorazepam


  (Ativan)  0.5 mg  DAILY  PRN


 PO


 ANXIETY  17 23:00


     


 


 


 Sodium Chloride


  (Sodium Chloride)  1 gm  TID


 PO


   17 09:00


     


 


 


 Pantoprazole


 Sodium


  (Protonix)  40 mg  DAILY


 PO


   17 09:00


     


 


 


 Sodium Chloride  1,000 ml @ 


 124 mls/hr  Q8H4M


 IV


   17 23:04


     


 


 


 Sodium Chloride


  (NS Flush)  2 ml  UNSCH  PRN


 IV FLUSH


 FLUSH AFTER USING IV ACCESS  17 23:15


     


 


 


 Sodium Chloride


  (NS Flush)  2 ml  BID


 IV FLUSH


   17 09:00


     


 


 


 Acetaminophen


  (Tylenol)  650 mg  Q6H  PRN


 PO


 PAIN 1-10 AND/OR FEVER >101F  17 23:15


     


 


 


 Famotidine


  (Pepcid Inj)  20 mg  Q12HR


 IV PUSH


   17 09:00


     


 


 


 Ondansetron HCl


  (Zofran Inj)  4 mg  Q6H  PRN


 IV PUSH


 NAUSEA OR VOMITING  17 23:15


     


 


 


 Albuterol/


 Ipratropium


  (Duoneb Neb)  1 ampule  Q4HR  NEB


 INH


   17 00:00


    17 23:43


 


 


 Albuterol/


 Ipratropium


  (Duoneb Neb)  1 ampule  Q2HR NEB  PRN


 INH


 WHEEZING  17 23:15


     


 


 


 Heparin Sodium


  (Porcine)


  (Heparin Inj)  5,000 units  Q8H


 SQ


   17 00:00


    17 01:05


 


 


 Miscellaneous


 Information  1  Q361D


 XX


   17 23:15


     


 


 


 Chlorhexidine


 Gluconate


  (Chlorhexidine


 2% Cloth)  3 pack


 Taper  DAILY@04


 TOP


   17 04:00


 18 03:59  17 01:06


 


 


 Chlorhexidine


 Gluconate


  (Chlorhexidine


 2% Cloth)  3 pack  UNSCH  PRN


 TOP


 HYGIENIC CARE  17 23:15


     


 


 


 Senna/Docusate


 Sodium


  (Agatha-Colace)  1 tab  BID


 PO


   17 09:00


     


 


 


 Magnesium


 Hydroxide


  (Milk Of


 Magnesia Liq)  30 ml  Q12H  PRN


 PO


 Mild constipation  17 23:15


     


 


 


 Sennosides


  (Senokot)  17.2 mg  Q12H  PRN


 PO


 Moderate constipation  17 23:15


     


 


 


 Bisacodyl


  (Dulcolax Supp)  10 mg  DAILY  PRN


 RECTAL


 SEVERE CONSITIPATION  17 23:15


     


 


 


 Lactulose


  (Lactulose Liq)  30 ml  DAILY  PRN


 PO


 SEVERE CONSITIPATION  17 23:15


     


 


 


 Azithromycin 500


 mg/Sodium Chloride  250 ml @ 


 250 mls/hr  Q24H


 IV


   17 00:00


    17 01:05


 


 


 Azithromycin


  (Zithromax)  500 mg  Q24H


 PO


   17 23:00


     


 


 


 Pharmacy Profile


 Note  0 ml @ 0


 mls/hr  UNSCH


 OTHER


   17 23:15


     


 








Family History


No family history significant of coronary artery disease or malignancy


Social History


Smokes pack per day


No alcohol or illicit drug abuse





Physical Exam


Vital Signs





Vital Signs








  Date Time  Temp Pulse Resp B/P (MAP) Pulse Ox O2 Delivery O2 Flow Rate FiO2


 


17 22:30  113 22 153/87 (109)  Nasal Cannula 2.00 


 


17 21:30  115 24 119/78 (92) 96 Nasal Cannula 2.00 


 


17 20:15   24  98 Aerosol Mask 8.00 


 


17 20:11     95 Nasal Cannula 2.00 


 


17 20:00 99.5 107 22 117/82 (94) 96 Nasal Cannula 2.00 


 


17 19:39 98.5 105 23 122/81 (95) 95   








Physical Exam


GENERAL: Lethargic cachectic sick man in moderate respiratory distress


SKIN: Focused skin assessment warm/dry.


HEAD: Atraumatic. Normocephalic. 


EYES: Pupils equal and round. No scleral icterus. No injection or drainage. 


ENT: No nasal bleeding or discharge.  Mucous membranes pink and moist.


NECK: Trachea midline. No JVD. 


CARDIOVASCULAR: Regular rate and rhythm.  No murmur appreciated.


RESPIRATORY: Accessory muscles of respiration used, wheezing bilaterally, 

decreased air entry


GASTROINTESTINAL: Abdomen soft, non-tender, nondistended. Hepatic and splenic 

margins not palpable. 


MUSCULOSKELETAL: No obvious deformities. No clubbing.  No cyanosis.  No edema. 


NEUROLOGICAL: GCS of 8


PSYCHIATRIC: Unable to assess


Laboratory





Laboratory Tests








Test


  17


20:16 17


20:20 17


20:25 17


20:45


 


Blood Gas Puncture Site RT BRACHIAL    


 


Blood Gas Patient Temperature 37.0    


 


Blood Gas HCO3 33    


 


Blood Gas Base Excess 6.7    


 


Blood Gas Oxygen Saturation 96    


 


Arterial Blood pH 7.32    


 


Arterial Blood Partial


Pressure CO2 65 


  


  


  


 


 


Arterial Blood Partial


Pressure O2 95 


  


  


  


 


 


Arterial Blood Oxygen Content 17.3    


 


Arterial Blood


Carboxyhemoglobin 0.9 


  


  


  


 


 


Arterial Blood Methemoglobin 0.4    


 


Blood Gas Hemoglobin 12.8    


 


Oxygen Delivery Device NASAL CANNULA    


 


Blood Gas Liter Flow 2    


 


Lactic Acid Level  2.3   


 


White Blood Count   9.0  


 


Red Blood Count   5.13  


 


Hemoglobin   13.7  


 


Hematocrit   42.1  


 


Mean Corpuscular Volume   82.1  


 


Mean Corpuscular Hemoglobin   26.7  


 


Mean Corpuscular Hemoglobin


Concent 


  


  32.5 


  


 


 


Red Cell Distribution Width   17.2  


 


Platelet Count   165  


 


Mean Platelet Volume   8.3  


 


Neutrophils (%) (Auto)   28.8  


 


Lymphocytes (%) (Auto)   16.8  


 


Monocytes (%) (Auto)   7.8  


 


Eosinophils (%) (Auto)   45.6  


 


Basophils (%) (Auto)   1.0  


 


Neutrophils # (Auto)   2.6  


 


Lymphocytes # (Auto)   1.5  


 


Monocytes # (Auto)   0.7  


 


Eosinophils # (Auto)   4.1  


 


Basophils # (Auto)   0.1  


 


CBC Comment   DIFF FINAL  


 


Differential Comment     


 


Prothrombin Time   11.1  


 


Prothromb Time International


Ratio 


  


  1.1 


  


 


 


Blood Urea Nitrogen   9  


 


Creatinine   0.93  


 


Random Glucose   121  


 


Total Protein   8.7  


 


Albumin   3.1  


 


Calcium Level   9.0  


 


Alkaline Phosphatase   189  


 


Aspartate Amino Transf


(AST/SGOT) 


  


  25 


  


 


 


Alanine Aminotransferase


(ALT/SGPT) 


  


  22 


  


 


 


Total Bilirubin   0.3  


 


Sodium Level   133  


 


Potassium Level   4.5  


 


Chloride Level   97  


 


Carbon Dioxide Level   32.8  


 


Anion Gap   3  


 


Estimat Glomerular Filtration


Rate 


  


  97 


  


 


 


Total Creatine Kinase   225  


 


Troponin I   0.04  


 


Thyroid Stimulating Hormone


3rd Gen 


  


  3.730 


  


 


 


Urine Color    YELLOW 


 


Urine Turbidity    CLEAR 


 


Urine pH    5.0 


 


Urine Specific Gravity    1.025 


 


Urine Protein    TRACE 


 


Urine Glucose (UA)    NEG 


 


Urine Ketones    NEG 


 


Urine Occult Blood    NEG 


 


Urine Nitrite    NEG 


 


Urine Bilirubin    NEG 


 


Urine Urobilinogen    LESS THAN 2.0 


 


Urine Leukocyte Esterase    NEG 


 


Urine RBC    1 


 


Urine WBC    LESS THAN 1 


 


Urine Bacteria    RARE 


 


Urine Hyaline Casts    3 


 


Urine Mucus    FEW 


 


Microscopic Urinalysis Comment


  


  


  


  CATH-CULTURE


IND














 Date/Time


Source Procedure


Growth Status


 


 


 17 20:25


Blood Peripheral Aerobic Blood Culture


Pending Received


 


 17 20:25


Blood Peripheral Anaerobic Blood Culture


Pending Received


 


 17 20:45


Urine Catheterized Urine Urine Culture


Pending Received








Result Diagram:  


17





Imaging





Last 24 hours Impressions








Head CT 17 Signed





Impressions: 





 Service Date/Time:   21:05 - CONCLUSION:  1. 

Moderate 





 periventricular and subcortical white matter small vessel ischemic changes 





 bilaterally.  2. Diffuse cerebral atrophy is stable.  3. No acute infarct, 

acute 





 hemorrhage, mass effect or extra-axial fluid collections.  4. Tiny air-fluid 





 level within the right maxillary sinus and diffuse mucosal thickening 

involving 





 all of the paranasal sinuses     Adam Zambrano MD 


 


Chest X-Ray 17 Signed





Impressions: 





 Service Date/Time:   20:27 - CONCLUSION:  1. No 

acute 





 cardiopulmonary disease.     Alireza Bozorgmanesh, MD Caprini VTE Risk Assessment


Caprini VTE Risk Assessment:  Mod/High Risk (score >= 2)


Caprini Risk Assessment Model











 Point Value = 1          Point Value = 2  Point Value = 3  Point Value = 5


 


Age 41-60


Minor surgery


BMI > 25 kg/m2


Swollen legs


Varicose veins


Pregnancy or postpartum


History of unexplained or recurrent


   spontaneous 


Oral contraceptives or hormone


   replacement


Sepsis (< 1 month)


Serious lung disease, including


   pneumonia (< 1 month)


Abnormal pulmonary function


Acute myocardial infarction


Congestive heart failure (< 1 month)


History of inflammatory bowel disease


Medical patient at bed rest Age 61-74


Arthroscopic surgery


Major open surgery (> 45 min)


Laparoscopic surgery (> 45 min)


Malignancy


Confined to bed (> 72 hours)


Immobilizing plaster cast


Central venous access Age >= 75


History of VTE


Family history of VTE


Factor V Leiden


Prothrombin 27365E


Lupus anticoagulant


Anticardiolipin antibodies


Elevated serum homocysteine


Heparin-induced thrombocytopenia


Other congenital or acquired


   thrombophilia Stroke (< 1 month)


Elective arthroplasty


Hip, pelvis, or leg fracture


Acute spinal cord injury (< 1 month)








Prophylaxis Regimen











   Total Risk


Factor Score Risk Level Prophylaxis Regimen


 


0-1      Low Early ambulation


 


2 Moderate Order ONE of the following:


*Sequential Compression Device (SCD)


*Heparin 5000 units SQ BID


 


3-4 Higher Order ONE of the following medications:


*Heparin 5000 units SQ TID


*Enoxaparin/Lovenox 40 mg SQ daily (WT < 150 kg, CrCl > 30 mL/min)


*Enoxaparin/Lovenox 30 mg SQ daily (WT < 150 kg, CrCl > 10-29 mL/min)


*Enoxaparin/Lovenox 30 mg SQ BID (WT < 150 kg, CrCl > 30 mL/min)


AND/OR


*Sequential Compression Device (SCD)


 


5 or more Highest Order ONE of the following medications:


*Heparin 5000 units SQ TID (Preferred with Epidurals)


*Enoxaparin/Lovenox 40 mg SQ daily (WT < 150 kg, CrCl > 30 mL/min)


*Enoxaparin/Lovenox 30 mg SQ daily (WT < 150 kg, CrCl > 10-29 mL/min)


*Enoxaparin/Lovenox 30 mg SQ BID (WT < 150 kg, CrCl > 30 mL/min)


AND


*Sequential Compression Device (SCD)











Assessment and Plan


Assessment and Plan


Respiratory failure


- Underlying COPD and asthma


- DuoNeb scheduled and when necessary


- No wheezing appreciated on exam we'll hold on steroids


- Broad-spectrum antibiotic


- Cultures and de-escalate per sensitivity


- Urine antigens





Sepsis


- Series of lactic acid


- Aggressive IV fluids resuscitation


- Broad-spectrum antibiotics and follow-up culture





History of hypertension


- Hold antihypertensive meds in a patient of early sepsis


- Resume when indicated





Anxiety


- Lorazepam when necessary





Tobacco use disorder


- Cessation when neurologically improved





Altered mental status


- CT head negative


- Most likely due to underlying infection


- Supportive care





DVT GI prophylaxis


- Teds SCDs


- Subcutaneous heparin and Pepcid





Critical Care:


The total critical care time was 35 minutes. Time to perform other separately 

billable procedures was not included in the critical care time.











Teja Nichole MD Dec 7, 2017 23:12

## 2017-12-07 NOTE — RADRPT
EXAM DATE/TIME:  12/07/2017 20:27 

 

[facility] COMPARISON:     

CHEST SINGLE AP, May 17, 2017, 19:35.

 

                     

INDICATIONS :     

Short of breath.

                     

 

MEDICAL HISTORY :     

Chronic obstructive pulmonary disease.  Emphysema.     Cardiovascular disease. Hypertension. Diabetes
 mellitus type 2.   

 

SURGICAL HISTORY :     

None.   

 

ENCOUNTER:     

Initial                                        

 

ACUITY:     

1 day      

 

PAIN SCORE:     

0/10

 

LOCATION:     

Bilateral chest 

 

FINDINGS:     

A single view of the chest demonstrates the lungs to be symmetrically aerated without evidence of mas
s, infiltrate or effusion.  The cardiomediastinal contours are unremarkable.  Osseous structures are 
intact.

 

CONCLUSION:     

1. No acute cardiopulmonary disease.

 

 

 

 Walter Russell MD on December 07, 2017 at 20:49           

Board Certified Radiologist.

 This report was verified electronically.

## 2017-12-07 NOTE — PD
HPI


Chief Complaint:  altered mental status


Time Seen by Provider:  19:42


Travel History


International Travel<30 days:  No


Contact w/Intl Traveler<30days:  No


Traveled to known affect area:  No





History of Present Illness


HPI


72-year-old male came to the emergency room sent from the nursing home with 

altered mental status this progressively worsening over past couple days.  

Today he was found to be unresponsive and in respiratory distress.  EMS was 

called.  Upon arrival they noticed that patient was wheezing.  Patient was 

given albuterol nebulizer and then was brought here.  His oxygen saturation 

initially was in the high 80s on 2 L of oxygen via nasal cannula that's his 

daily requirement.  After the nebulizer oxygen saturation improved to 95% on 2 

L.  Patient arrived with a GCS of 8.  Patient is a full code.  However he seems 

to be maintaining his airway.  Patient was unable to give any meaningful 

history given his altered mental state.  History was obtained from the 

paramedics as well as the report that came from the nursing home.





[facility]


Past Medical History


*** Narrative Medical


List of his past medical, surgical, social and family history is reviewed from 

the nursing note.


Arthritis:  Yes


Asthma:  Yes


Cancer:  No


Cardiovascular Problems:  Yes


High Cholesterol:  Yes


Chest Pain:  Yes


COPD:  Yes


Diabetes:  Yes


Diminished Hearing:  No


Gastrointestinal Disorders:  No


Glaucoma:  Yes


Genitourinary:  Yes


Hypertension:  Yes


Immune Disorder:  No


Insomnia:  Yes


Musculoskeletal:  Yes


Neurologic:  No


Psychiatric:  No


Respiratory:  Yes (COPD)





Past Surgical History


Ear Surgery:  Yes (BILATERAL CATARACT REMOVAL)


Eye Surgery:  Yes (CATARACT REMOVAL)





Social History


Alcohol Use:  No


Tobacco Use:  No


Substance Use:  No





Allergies-Medications


(Allergen,Severity, Reaction):  


Coded Allergies:  


     diatrizoate meglumine (Unverified  Allergy, Mild, HIVES, ITCHING, 8/15/17)


     gadobenic acid (Unverified  Allergy, Mild, HIVES, ITCHING, 8/15/17)


     gadodiamide (Unverified  Allergy, Mild, HIVES, ITCHING, 8/15/17)


     gadoteridol (Unverified  Allergy, Mild, HIVES, ITCHING, 8/15/17)


     iodixanol (Unverified  Allergy, Mild, HIVES, ITCHING, 8/15/17)


     iohexol (Unverified  Allergy, Mild, HIVES, ITCHING, 8/15/17)


Comments


List of his allergies reviewed from the nursing note.


Reported Meds & Prescriptions





Reported Meds & Active Scripts


Active


Walker with Front Wheels (Device) 1 Mis Mis 1 Ea .ROUTE AS DIRECTED


Sodium Chloride 1 Gm Tab 1 Gm PO TID


Aspirin EC (Aspirin) 81 Mg Tabdr 81 Mg PO DAILY


Potassium Chloride Microencaps 20 Meq Tab 20 Meq PO Q12HR 10 Days


Reported


Metoprolol Succinate ER 24 HR (Metoprolol Succinate) 50 Mg Tab 50 Mg PO DAILY


Lorazepam 0.5 Mg Tab 0.5 Mg PO DAILY PRN


Cetirizine (Cetirizine HCl) 10 Mg Tab 10 Mg PO DAILY


Amlodipine (Amlodipine Besylate) 2.5 Mg Tab 2.5 Mg PO DAILY


Omeprazole 40 Mg Cap 40 Mg PO DAILY


Atorvastatin (Atorvastatin Calcium) 80 Mg Tab 80 Mg PO HS





Narrative Medication


List of his home medications reviewed from the nursing note.





Review of Systems


ROS Limitations:  Altered Mental Status


Except as stated in HPI:  all other systems reviewed are Neg





Physical Exam


Narrative


GENERAL: Unresponsive, respiratory distress, moderate distress


SKIN: Focused skin assessment warm/dry.


HEAD: Atraumatic. Normocephalic. 


EYES: Pupils equal and round. No scleral icterus. No injection or drainage. 


ENT: No nasal bleeding or discharge.  Mucous membranes pink and moist.


NECK: Trachea midline. No JVD. 


CARDIOVASCULAR: Regular rate and rhythm.  No murmur appreciated.


RESPIRATORY: Accessory muscles of respiration used, wheezing bilaterally, 

decreased air entry


GASTROINTESTINAL: Abdomen soft, non-tender, nondistended. Hepatic and splenic 

margins not palpable. 


MUSCULOSKELETAL: No obvious deformities. No clubbing.  No cyanosis.  No edema. 


NEUROLOGICAL: GCS of 8


PSYCHIATRIC: Unable to assess





Data


Data


Last Documented VS





Orders





 Orders


Electrocardiogram (12/7/17 20:02)


Complete Blood Count With Diff (12/7/17 20:02)


Comprehensive Metabolic Panel (12/7/17 20:02)


Creatine Kinase (Cpk) (12/7/17 20:02)


Prothrombin Time / Inr (Pt) (12/7/17 20:02)


Troponin I (12/7/17 20:02)


Thyroid Stimulating Hormone (12/7/17 20:02)


Urinalysis - C+S If Indicated (12/7/17 20:02)


Lactic Acid Sepsis Protocol (12/7/17 20:02)


Arterial Blood Gas (Abg) (12/7/17 20:02)


Blood Culture (12/7/17 20:02)


Chest, Single Ap (12/7/17 20:02)


Ct Brain W/O Iv Contrast(Rout) (12/7/17 20:02)


Blood Glucose (12/7/17 20:02)


Ecg Monitoring (12/7/17 20:02)


Iv Access Insert/Monitor (12/7/17 20:02)


Oximetry (12/7/17 20:02)


Sodium Chloride 0.9% Flush (Ns Flush) (12/7/17 20:15)


Sodium Chlor 0.9% 1000 Ml Inj (Ns 1000 M (12/7/17 20:02)


Methylprednisolone So Succ Inj (Solumedr (12/7/17 20:15)


Albuterol-Ipratropium Neb (Duoneb Neb) (12/7/17 20:15)


Urinary Catheter Insert/Apply (12/7/17 20:03)


Piperacil-Tazo 4.5 Gm Premix (Zosyn 4.5 (12/7/17 21:45)


Vancomycin Inj (Vancomycin Inj) (12/7/17 21:45)


Sodium Chlor 0.9% 1000 Ml Inj (Ns 1000 M (12/7/17 21:45)


Sodium Chlor 0.9% 1000 Ml Inj (Ns 1000 M (12/7/17 21:45)


Urine Culture (12/7/17 20:45)


Admit Order (Ed Use Only) (12/7/17 22:13)





Labs





Laboratory Tests








Test


  12/7/17


20:16 12/7/17


20:20 12/7/17


20:25 12/7/17


20:45


 


Blood Gas Puncture Site RT BRACHIAL    


 


Blood Gas Patient Temperature 37.0    


 


Blood Gas HCO3 33 mmol/L    


 


Blood Gas Base Excess 6.7 mmol/L    


 


Blood Gas Oxygen Saturation 96 %    


 


Arterial Blood pH 7.32    


 


Arterial Blood Partial


Pressure CO2 65 mmHg 


  


  


  


 


 


Arterial Blood Partial


Pressure O2 95 mmHG 


  


  


  


 


 


Arterial Blood Oxygen Content 17.3 Vol %    


 


Arterial Blood


Carboxyhemoglobin 0.9 % 


  


  


  


 


 


Arterial Blood Methemoglobin 0.4 %    


 


Blood Gas Hemoglobin 12.8 G/DL    


 


Oxygen Delivery Device NASAL CANNULA    


 


Blood Gas Liter Flow 2 L/M    


 


Lactic Acid Level  2.3 mmol/L   


 


White Blood Count   9.0 TH/MM3  


 


Red Blood Count   5.13 MIL/MM3  


 


Hemoglobin   13.7 GM/DL  


 


Hematocrit   42.1 %  


 


Mean Corpuscular Volume   82.1 FL  


 


Mean Corpuscular Hemoglobin   26.7 PG  


 


Mean Corpuscular Hemoglobin


Concent 


  


  32.5 % 


  


 


 


Red Cell Distribution Width   17.2 %  


 


Platelet Count   165 TH/MM3  


 


Mean Platelet Volume   8.3 FL  


 


Neutrophils (%) (Auto)   28.8 %  


 


Lymphocytes (%) (Auto)   16.8 %  


 


Monocytes (%) (Auto)   7.8 %  


 


Eosinophils (%) (Auto)   45.6 %  


 


Basophils (%) (Auto)   1.0 %  


 


Neutrophils # (Auto)   2.6 TH/MM3  


 


Lymphocytes # (Auto)   1.5 TH/MM3  


 


Monocytes # (Auto)   0.7 TH/MM3  


 


Eosinophils # (Auto)   4.1 TH/MM3  


 


Basophils # (Auto)   0.1 TH/MM3  


 


CBC Comment   DIFF FINAL  


 


Differential Comment     


 


Prothrombin Time   11.1 SEC  


 


Prothromb Time International


Ratio 


  


  1.1 RATIO 


  


 


 


Blood Urea Nitrogen   9 MG/DL  


 


Creatinine   0.93 MG/DL  


 


Random Glucose   121 MG/DL  


 


Total Protein   8.7 GM/DL  


 


Albumin   3.1 GM/DL  


 


Calcium Level   9.0 MG/DL  


 


Alkaline Phosphatase   189 U/L  


 


Aspartate Amino Transf


(AST/SGOT) 


  


  25 U/L 


  


 


 


Alanine Aminotransferase


(ALT/SGPT) 


  


  22 U/L 


  


 


 


Total Bilirubin   0.3 MG/DL  


 


Sodium Level   133 MEQ/L  


 


Potassium Level   4.5 MEQ/L  


 


Chloride Level   97 MEQ/L  


 


Carbon Dioxide Level   32.8 MEQ/L  


 


Anion Gap   3 MEQ/L  


 


Estimat Glomerular Filtration


Rate 


  


  97 ML/MIN 


  


 


 


Total Creatine Kinase   225 U/L  


 


Troponin I   0.04 NG/ML  


 


Thyroid Stimulating Hormone


3rd Gen 


  


  3.730 uIU/ML 


  


 


 


Urine Color    YELLOW 


 


Urine Turbidity    CLEAR 


 


Urine pH    5.0 


 


Urine Specific Gravity    1.025 


 


Urine Protein    TRACE mg/dL 


 


Urine Glucose (UA)    NEG mg/dL 


 


Urine Ketones    NEG mg/dL 


 


Urine Occult Blood    NEG 


 


Urine Nitrite    NEG 


 


Urine Bilirubin    NEG 


 


Urine Urobilinogen


  


  


  


  LESS THAN 2.0


MG/DL


 


Urine Leukocyte Esterase    NEG 


 


Urine RBC    1 /hpf 


 


Urine WBC


  


  


  


  LESS THAN 1


/hpf


 


Urine Bacteria    RARE /hpf 


 


Urine Hyaline Casts    3 /lpf 


 


Urine Mucus    FEW /lpf 


 


Microscopic Urinalysis Comment


  


  


  


  CATH-CULTURE


IND











MDM


Medical Decision Making


Medical Screen Exam Complete:  Yes


Emergency Medical Condition:  Yes


Medical Record Reviewed:  Yes


Interpretation(s)


Twelve-lead EKG was reviewed by me.  Normal sinus rhythm, left axis deviation, 

old anterior MI, minimal inferior ST depressions, tachycardia.  Heart rate of 

115 bpm.


Differential Diagnosis


Sepsis, pneumonia, UTI, COPD exacerbation


Narrative Course


10:32 PM rectal temperature was 99.5.  Blood test results of back and patient 

has elevated lactic acidosis.  Patient was given 3 duo nebs upon arrival an IV 

Solu-Medrol.  Was a blood gas done which showed some elevation of the CO2.  

Given the elevated lactic acidosis I started him on IV Zosyn and vancomycin and 

given 2 L of IV fluid bolus.  When I discussed the case with the hospitalist 

Dr. Dang she wanted the patient to be admitted to the intensivist given the 

hypercarbia, altered mental status and EF of 30% from past medical record.  I'

ve discussed the case with Dr. Nichole was accepted the patient.  I just went and 

reassessed the patient and he is little more awake but still confused.  GCS is 

13 at this point.


Critical Care Narrative


Aggregate critical care time was 60 minutes. Time to perform other separately 

billable procedures was not  


included in the critical care time. My time did not include minutes spent 

treating any other patients simultaneously or on  


activities that did not directly contribute to the patient's treatment.  





The services I provided to this patient were to treat and/or prevent clinically 

significant deterioration that could result  


in: Respiratory distress, sepsis, altered mental status





I provided critical care services requiring my management, as noted below:


Chart data review, documentation time, medication orders and management, vital 

sign assessments/reviewing monitor data,  


ordering and reviewing lab tests, ordering and interpreting/reviewing x-rays 

and diagnostic studies, care of the patient  


and discussion of the patient with the admitting physicians.





Procedures


EKG Prior to Arrival:  No





Physician Communication


Physician Communication


Dr. Nichole





Diagnosis





 Primary Impression:  


 Altered mental status, unspecified


 Qualified Codes:  R40.1 - Stupor


 Additional Impressions:  


 Sepsis


 Qualified Codes:  A41.9 - Sepsis, unspecified organism


 Respiratory distress


 Acute exacerbation of chronic obstructive pulmonary disease (COPD)





Admitting Information


Admitting Physician Requests:  it











Ngozi Jauregui MD Dec 7, 2017 19:48

## 2017-12-07 NOTE — RADRPT
EXAM DATE/TIME:  12/07/2017 21:05 

 

[facility] COMPARISON:     

CT BRAIN W/O CONTRAST, June 02, 2017, 16:07.

 

 

INDICATIONS :     

Altered mental status.

                      

 

RADIATION DOSE:     

37.43 CTDIvol (mGy) ; Patient motion

 

 

 

MEDICAL HISTORY :     

Hypertension. Diabetes mellitus type 1. 

 

SURGICAL HISTORY :      

None. 

 

ENCOUNTER:      

Initial

 

ACUITY:      

1 day

 

PAIN SCALE:      

0/10

 

LOCATION:        

cranial 

 

TECHNIQUE:     

Multiple contiguous axial images were obtained of the head.  Using automated exposure control and adj
ustment of the mA and/or kV according to patient size, radiation dose was kept as low as reasonably a
chievable to obtain optimal diagnostic quality images.   DICOM format image data is available electro
nically for review and comparison.  

 

FINDINGS:     

 

CEREBRUM:     

Cerebral atrophy is again noted and is unchanged compared to previous examination. Moderate periventr
icular and subcortical white matter small vessel ischemic changes are noted bilaterally. No evidence 
of midline shift, mass lesion, hemorrhage or acute infarction.  No extra-axial fluid collections are 
seen.

 

POSTERIOR FOSSA:     

The cerebellum and brainstem are intact.  The 4th ventricle is midline.  The cerebellopontine angle i
s unremarkable.

 

EXTRACRANIAL:     

The visualized portion of the orbits is intact. A tiny air-fluid level is noted within the right maxi
llary sinus and mucosal thickening is noted involving the bilateral frontal, ethmoid, sphenoid and ma
xillary sinuses.

 

SKULL:     

The calvaria is intact.  No evidence of skull fracture.

 

CONCLUSION:     

1. Moderate periventricular and subcortical white matter small vessel ischemic changes bilaterally. 

2. Diffuse cerebral atrophy is stable. 

3. No acute infarct, acute hemorrhage, mass effect or extra-axial fluid collections. 

4. Tiny air-fluid level within the right maxillary sinus and diffuse mucosal thickening involving all
 of the paranasal sinuses 

 

 

 Adam Zambrano MD on December 07, 2017 at 21:32           

Board Certified Radiologist.

 This report was verified electronically.

## 2017-12-07 NOTE — EKG
Date Performed: 12/07/2017       Time Performed: 20:44:48

 

PTAGE:      72 years

 

EKG:      SINUS TACHYCARDIA LEFT ANTERIOR FASCICULAR BLOCK SEPTAL MYOCARDIAL INFARCTION Nonspecific T
 wave changes ABNORMAL ECG Compared to prior electrocardiogram, Nonspecific ST and T wave abnormaliti
es are less marked 

 

 PREVIOUS TRACING            : 05/13/2017 16.43

 

DOCTOR:   Yang Muir  Interpretating Date/Time  12/07/2017 22:27:26

## 2017-12-08 VITALS — HEART RATE: 89 BPM | DIASTOLIC BLOOD PRESSURE: 50 MMHG | OXYGEN SATURATION: 97 % | SYSTOLIC BLOOD PRESSURE: 80 MMHG

## 2017-12-08 VITALS
HEART RATE: 80 BPM | OXYGEN SATURATION: 97 % | SYSTOLIC BLOOD PRESSURE: 97 MMHG | DIASTOLIC BLOOD PRESSURE: 60 MMHG | TEMPERATURE: 98.6 F | RESPIRATION RATE: 22 BRPM

## 2017-12-08 VITALS
SYSTOLIC BLOOD PRESSURE: 141 MMHG | DIASTOLIC BLOOD PRESSURE: 72 MMHG | RESPIRATION RATE: 20 BRPM | HEART RATE: 100 BPM | OXYGEN SATURATION: 95 %

## 2017-12-08 VITALS — OXYGEN SATURATION: 96 %

## 2017-12-08 VITALS
SYSTOLIC BLOOD PRESSURE: 96 MMHG | OXYGEN SATURATION: 96 % | RESPIRATION RATE: 24 BRPM | DIASTOLIC BLOOD PRESSURE: 58 MMHG | HEART RATE: 98 BPM

## 2017-12-08 VITALS
HEART RATE: 91 BPM | SYSTOLIC BLOOD PRESSURE: 78 MMHG | TEMPERATURE: 98.3 F | RESPIRATION RATE: 16 BRPM | OXYGEN SATURATION: 90 % | DIASTOLIC BLOOD PRESSURE: 53 MMHG

## 2017-12-08 VITALS — HEART RATE: 92 BPM | OXYGEN SATURATION: 100 % | RESPIRATION RATE: 19 BRPM

## 2017-12-08 VITALS
DIASTOLIC BLOOD PRESSURE: 65 MMHG | SYSTOLIC BLOOD PRESSURE: 91 MMHG | RESPIRATION RATE: 23 BRPM | HEART RATE: 98 BPM | TEMPERATURE: 99 F | OXYGEN SATURATION: 96 %

## 2017-12-08 VITALS — HEART RATE: 96 BPM | RESPIRATION RATE: 16 BRPM | DIASTOLIC BLOOD PRESSURE: 70 MMHG | SYSTOLIC BLOOD PRESSURE: 99 MMHG

## 2017-12-08 VITALS — DIASTOLIC BLOOD PRESSURE: 67 MMHG | SYSTOLIC BLOOD PRESSURE: 93 MMHG | RESPIRATION RATE: 18 BRPM | HEART RATE: 92 BPM

## 2017-12-08 VITALS — HEART RATE: 91 BPM | OXYGEN SATURATION: 93 % | SYSTOLIC BLOOD PRESSURE: 89 MMHG | DIASTOLIC BLOOD PRESSURE: 57 MMHG

## 2017-12-08 VITALS
HEART RATE: 114 BPM | RESPIRATION RATE: 32 BRPM | OXYGEN SATURATION: 92 % | DIASTOLIC BLOOD PRESSURE: 68 MMHG | SYSTOLIC BLOOD PRESSURE: 106 MMHG

## 2017-12-08 VITALS — HEART RATE: 92 BPM

## 2017-12-08 VITALS — HEART RATE: 90 BPM

## 2017-12-08 VITALS — HEART RATE: 95 BPM

## 2017-12-08 VITALS — OXYGEN SATURATION: 93 %

## 2017-12-08 VITALS — HEART RATE: 81 BPM

## 2017-12-08 VITALS — HEART RATE: 96 BPM

## 2017-12-08 LAB
ALP SERPL-CCNC: 159 U/L (ref 45–117)
ALT SERPL-CCNC: 23 U/L (ref 12–78)
ANION GAP SERPL CALC-SCNC: 8 MEQ/L (ref 5–15)
AST SERPL-CCNC: 22 U/L (ref 15–37)
BASOPHILS # BLD AUTO: 0 TH/MM3 (ref 0–0.2)
BASOPHILS NFR BLD: 0.6 % (ref 0–2)
BILIRUB SERPL-MCNC: 0.3 MG/DL (ref 0.2–1)
BUN SERPL-MCNC: 8 MG/DL (ref 7–18)
CHLORIDE SERPL-SCNC: 100 MEQ/L (ref 98–107)
EOSINOPHIL # BLD: 0 TH/MM3 (ref 0–0.4)
EOSINOPHIL NFR BLD: 0.2 % (ref 0–4)
ERYTHROCYTE [DISTWIDTH] IN BLOOD BY AUTOMATED COUNT: 16.8 % (ref 11.6–17.2)
GFR SERPLBLD BASED ON 1.73 SQ M-ARVRAT: 120 ML/MIN (ref 89–?)
HCO3 BLD-SCNC: 29.5 MEQ/L (ref 21–32)
HCT VFR BLD CALC: 34.1 % (ref 39–51)
HEMO FLAGS: (no result)
LACTIC ACID GHOST: (no result)
LYMPHOCYTES # BLD AUTO: 1.6 TH/MM3 (ref 1–4.8)
LYMPHOCYTES NFR BLD AUTO: 24.9 % (ref 9–44)
MAGNESIUM SERPL-MCNC: 1.5 MG/DL (ref 1.5–2.5)
MCH RBC QN AUTO: 26.7 PG (ref 27–34)
MCHC RBC AUTO-ENTMCNC: 32.8 % (ref 32–36)
MCV RBC AUTO: 81.3 FL (ref 80–100)
MONOCYTES NFR BLD: 7.6 % (ref 0–8)
NEUTROPHILS # BLD AUTO: 4.2 TH/MM3 (ref 1.8–7.7)
NEUTROPHILS NFR BLD AUTO: 66.7 % (ref 16–70)
PLATELET # BLD: 155 TH/MM3 (ref 150–450)
POTASSIUM SERPL-SCNC: 4.1 MEQ/L (ref 3.5–5.1)
RBC # BLD AUTO: 4.2 MIL/MM3 (ref 4.5–5.9)
SODIUM SERPL-SCNC: 137 MEQ/L (ref 136–145)
WBC # BLD AUTO: 6.3 TH/MM3 (ref 4–11)

## 2017-12-08 RX ADMIN — ATORVASTATIN CALCIUM SCH MG: 80 TABLET, FILM COATED ORAL at 19:48

## 2017-12-08 RX ADMIN — STANDARDIZED SENNA CONCENTRATE AND DOCUSATE SODIUM SCH TAB: 8.6; 5 TABLET, FILM COATED ORAL at 09:44

## 2017-12-08 RX ADMIN — MAGNESIUM SULFATE IN DEXTROSE SCH MLS/HR: 10 INJECTION, SOLUTION INTRAVENOUS at 10:21

## 2017-12-08 RX ADMIN — SODIUM CHLORIDE TAB 1 GM SCH GM: 1 TAB at 17:59

## 2017-12-08 RX ADMIN — MAGNESIUM SULFATE IN DEXTROSE SCH MLS/HR: 10 INJECTION, SOLUTION INTRAVENOUS at 11:21

## 2017-12-08 RX ADMIN — SODIUM CHLORIDE TAB 1 GM SCH GM: 1 TAB at 09:00

## 2017-12-08 RX ADMIN — PHENYTOIN SODIUM SCH MLS/HR: 50 INJECTION INTRAMUSCULAR; INTRAVENOUS at 07:08

## 2017-12-08 RX ADMIN — IPRATROPIUM BROMIDE AND ALBUTEROL SULFATE SCH AMPULE: .5; 3 SOLUTION RESPIRATORY (INHALATION) at 04:52

## 2017-12-08 RX ADMIN — INSULIN ASPART SCH: 100 INJECTION, SOLUTION INTRAVENOUS; SUBCUTANEOUS at 11:30

## 2017-12-08 RX ADMIN — HEPARIN SODIUM SCH UNITS: 10000 INJECTION, SOLUTION INTRAVENOUS; SUBCUTANEOUS at 15:44

## 2017-12-08 RX ADMIN — AZITHROMYCIN SCH MLS/HR: 500 INJECTION, POWDER, LYOPHILIZED, FOR SOLUTION INTRAVENOUS at 01:05

## 2017-12-08 RX ADMIN — PANTOPRAZOLE SCH MG: 40 TABLET, DELAYED RELEASE ORAL at 09:45

## 2017-12-08 RX ADMIN — HEPARIN SODIUM SCH UNITS: 10000 INJECTION, SOLUTION INTRAVENOUS; SUBCUTANEOUS at 01:05

## 2017-12-08 RX ADMIN — PHENYTOIN SODIUM SCH MLS/HR: 50 INJECTION INTRAMUSCULAR; INTRAVENOUS at 14:30

## 2017-12-08 RX ADMIN — CETIRIZINE HYDROCHLORIDE SCH MG: 10 TABLET, FILM COATED ORAL at 09:45

## 2017-12-08 RX ADMIN — HEPARIN SODIUM SCH UNITS: 10000 INJECTION, SOLUTION INTRAVENOUS; SUBCUTANEOUS at 08:00

## 2017-12-08 RX ADMIN — STANDARDIZED SENNA CONCENTRATE AND DOCUSATE SODIUM SCH TAB: 8.6; 5 TABLET, FILM COATED ORAL at 19:47

## 2017-12-08 RX ADMIN — IPRATROPIUM BROMIDE AND ALBUTEROL SULFATE SCH AMPULE: .5; 3 SOLUTION RESPIRATORY (INHALATION) at 07:45

## 2017-12-08 RX ADMIN — IPRATROPIUM BROMIDE AND ALBUTEROL SULFATE SCH AMPULE: .5; 3 SOLUTION RESPIRATORY (INHALATION) at 14:59

## 2017-12-08 RX ADMIN — IPRATROPIUM BROMIDE AND ALBUTEROL SULFATE SCH AMPULE: .5; 3 SOLUTION RESPIRATORY (INHALATION) at 11:07

## 2017-12-08 RX ADMIN — IPRATROPIUM BROMIDE AND ALBUTEROL SULFATE SCH AMPULE: .5; 3 SOLUTION RESPIRATORY (INHALATION) at 20:30

## 2017-12-08 RX ADMIN — Medication SCH ML: at 19:48

## 2017-12-08 RX ADMIN — SODIUM CHLORIDE SCH MLS/HR: 900 INJECTION INTRAVENOUS at 14:30

## 2017-12-08 RX ADMIN — Medication SCH ML: at 09:00

## 2017-12-08 RX ADMIN — INSULIN ASPART SCH: 100 INJECTION, SOLUTION INTRAVENOUS; SUBCUTANEOUS at 19:49

## 2017-12-08 RX ADMIN — FAMOTIDINE SCH MG: 10 INJECTION, SOLUTION INTRAVENOUS at 19:47

## 2017-12-08 RX ADMIN — PHENYTOIN SODIUM SCH MLS/HR: 50 INJECTION INTRAMUSCULAR; INTRAVENOUS at 23:16

## 2017-12-08 RX ADMIN — FAMOTIDINE SCH MG: 10 INJECTION, SOLUTION INTRAVENOUS at 09:00

## 2017-12-08 RX ADMIN — ASPIRIN SCH MG: 81 TABLET ORAL at 09:45

## 2017-12-08 RX ADMIN — PHENYTOIN SODIUM SCH MLS/HR: 50 INJECTION INTRAMUSCULAR; INTRAVENOUS at 09:46

## 2017-12-08 RX ADMIN — METOPROLOL SUCCINATE SCH MG: 50 TABLET, FILM COATED, EXTENDED RELEASE ORAL at 10:00

## 2017-12-08 RX ADMIN — INSULIN ASPART SCH: 100 INJECTION, SOLUTION INTRAVENOUS; SUBCUTANEOUS at 16:19

## 2017-12-08 RX ADMIN — SODIUM CHLORIDE SCH MLS/HR: 900 INJECTION INTRAVENOUS at 17:58

## 2017-12-08 RX ADMIN — CHLORHEXIDINE GLUCONATE SCH PACK: 500 CLOTH TOPICAL at 01:06

## 2017-12-08 RX ADMIN — SODIUM CHLORIDE TAB 1 GM SCH GM: 1 TAB at 15:43

## 2017-12-08 NOTE — RADRPT
EXAM DATE/TIME:  12/08/2017 22:05 

 

[facility] COMPARISON:     

CT ABDOMEN & PELVIS W/O CONTRAST, October 13, 2016, 0:17.

 

 

INDICATIONS :     

Liver disorder.

                  

 

ORAL CONTRAST:      

Prescribed oral contrast ingested.

                  

 

RADIATION DOSE:     

13.53 CTDIvol (mGy) 

 

 

MEDICAL HISTORY :     

Cardiovascular disease. Diabetes mellitus type 2. Hypertension.

 

SURGICAL HISTORY :      

None. 

 

ENCOUNTER:      

Initial

 

ACUITY:      

1 day

 

PAIN SCALE:      

Non-responsive

 

LOCATION:         

abdomen

 

TECHNIQUE:     

Volumetric scanning of the abdomen and pelvis was performed.  Using automated exposure control and ad
justment of the mA and/or kV according to patient size, radiation dose was kept as low as reasonably 
achievable to obtain optimal diagnostic quality images.  DICOM format image data is available electro
nically for review and comparison.  

 

FINDINGS:     

Unenhanced appearance of the kidneys, pancreas, adrenal glands, spleen unremarkable. Atherosclerotic 
calcifications of the aorta and iliac vasculature. Beckford catheter in urinary bladder. Prostate unrema
rkable. No evidence of bowel obstruction. The appendix is normal. There is no adenopathy or aneurysm.
 There are stable cysts in the lateral segment of the left lobe of the liver and in the caudate lobe.
 There are fibrotic changes and mild basilar atelectasis versus airspace disease at the lung bases. T
he osseous structures are intact.

 

CONCLUSION:     

1. Hepatic cysts.

2. Atherosclerosis.

 

 

 

 Maurice An MD on December 08, 2017 at 23:09           

Board Certified Radiologist.

 This report was verified electronically.

## 2017-12-08 NOTE — PD.[email or web address].CON
History of Present Illness


Service


ID


Consult Requested By


Dr Tilley


Reason for Consult


sepsis, h/o ESBL bacteremia


Primary Care Physician


Unknown


Diagnoses:  


History of Present Illness


71 yo male with multiple medical problems presented from nursing home with 

mental status change x 2 days 


Pt was noted to have no fever or leukocytosis, but lactic acid was elevated up 

to 3.1 (going up)


Pt was quite confused and agitated during my visit, was trying to get OOB  and 

leave.


Pt is denying headache and or abdominal pain and he is not seeming to be in any 

pain, but is very confused and only oriented x 1


He has negative blood clx @ 1 day x 2/2


His UA appears clean


CXR is negative


Hypotensive


His Alk phos is elevated





Review of Systems


ROS Limitations:  Altered Mental Status, Poor Historian





Past Family Social History


Allergies:  


Coded Allergies:  


     diatrizoate meglumine (Unverified  Allergy, Mild, HIVES, ITCHING, 8/15/17)


     gadobenic acid (Unverified  Allergy, Mild, HIVES, ITCHING, 8/15/17)


     gadodiamide (Unverified  Allergy, Mild, HIVES, ITCHING, 8/15/17)


     gadoteridol (Unverified  Allergy, Mild, HIVES, ITCHING, 8/15/17)


     iodixanol (Unverified  Allergy, Mild, HIVES, ITCHING, 8/15/17)


     iohexol (Unverified  Allergy, Mild, HIVES, ITCHING, 8/15/17)


Past Medical History


Asthma


COPD


Glaucoma


Hypertension


Past Surgical History


 


Cataract surgery


Active Ordered Medications


Medications where reviewed in EMR


Antibiotics Include:  azithro


zosyn - stopped


vanco


Family History


reviewed


non contributory


Social History


Smokes pack per day


No alcohol or illicit drug abuse





Physical Exam


Vital Signs





Vital Signs








  Date Time  Temp Pulse Resp B/P (MAP) Pulse Ox O2 Delivery O2 Flow Rate FiO2


 


12/8/17 07:45     96 Nasal Cannula 2.00 


 


12/8/17 06:00  96      


 


12/8/17 04:00 99.0 95 23 91/65 (74) 96   


 


12/8/17 04:00  98      


 


12/8/17 02:00  95      


 


12/8/17 00:20        


 


12/8/17 00:05  100 20 141/72 (95) 95 Room Air 2.00 


 


12/7/17 23:30  104 20 151/79 (103) 96 Nasal Cannula 2.00 


 


12/7/17 22:30  113 22 153/87 (109) 95 Nasal Cannula 2.00 


 


12/7/17 21:30  115 24 119/78 (92) 96 Nasal Cannula 2.00 


 


12/7/17 20:45  120 23 117/82 (94) 96 Nasal Cannula 2.00 


 


12/7/17 20:15   24  98 Aerosol Mask 8.00 


 


12/7/17 20:11     95 Nasal Cannula 2.00 


 


12/7/17 20:05 99.5 112 24 117/82 (94) 96 Nasal Cannula 2.00 


 


12/7/17 20:05  112 24 117/82 (94) 96 Nasal Cannula 2.00 


 


12/7/17 20:00 99.5 107 22 117/82 (94) 96 Nasal Cannula 2.00 


 


12/7/17 20:00      Nasal Cannula 2.00 


 


12/7/17 19:39 98.5 105 23 122/81 (95) 95   








Physical Exam


CONSTITUTIONAL/GENERAL: This is a thin elderly patient, in no apparent distress.


TUBES/LINES/DRAINS:


SKIN: No jaundice, rashes, or lesions.  Skin temperature appropriate. Not 

diaphoretic. 


HEAD: Atraumatic. Normocephalic.


EYES: Pupils equal and round and reactive. Extraocular motions intact. No 

scleral icterus. No injection or drainage. Fundi not examined.


ENT: Hearing grossly normal. Nose without bleeding or purulent drainage. Throat 

without visible erythema, exudates, masses, or lesions.


NECK: Trachea midline. Supple, nontender. 


CARDIOVASCULAR: Regular rate and rhythm without murmurs, gallops, or rubs. No 

JVD. Peripheral pulses symmetric.


RESPIRATORY/CHEST: Symmetric, unlabored respirations. Clear to auscultation. 

Breath sounds equal bilaterally. No wheezes, rales, or rhonchi.  


GASTROINTESTINAL: Abdomen soft, non-tender, nondistended. No hepato-splenomegaly

, or palpable masses. No guarding. Bowel sounds present.


GENITOURINARY: Without palpable bladder distension. [place] in place with cloudy 

appearing urine


MUSCULOSKELETAL: Extremities without clubbing, cyanosis, or edema. No joint 

tenderness or effusion noted. No calf tenderness. No mottling or clubbing.


LYMPHATICS: No palpable cervical or supraclavicular adenopathy.


NEUROLOGICAL: Awake and alert. Agitated and confused. Oriented x 1 . Speech 

incoherent . Moves all extremities.


PSYCHIATRIC: agitated


Laboratory





Laboratory Tests








Test


  12/7/17


20:16 12/7/17


20:20 12/7/17


20:25 12/7/17


20:45


 


Blood Gas Puncture Site RT BRACHIAL    


 


Blood Gas Patient Temperature 37.0    


 


Blood Gas HCO3 33    


 


Blood Gas Base Excess 6.7    


 


Blood Gas Oxygen Saturation 96    


 


Arterial Blood pH 7.32    


 


Arterial Blood Partial


Pressure CO2 65 


  


  


  


 


 


Arterial Blood Partial


Pressure O2 95 


  


  


  


 


 


Arterial Blood Oxygen Content 17.3    


 


Arterial Blood


Carboxyhemoglobin 0.9 


  


  


  


 


 


Arterial Blood Methemoglobin 0.4    


 


Blood Gas Hemoglobin 12.8    


 


Oxygen Delivery Device NASAL CANNULA    


 


Blood Gas Liter Flow 2    


 


Lactic Acid Level  2.3   


 


White Blood Count   9.0  


 


Red Blood Count   5.13  


 


Hemoglobin   13.7  


 


Hematocrit   42.1  


 


Mean Corpuscular Volume   82.1  


 


Mean Corpuscular Hemoglobin   26.7  


 


Mean Corpuscular Hemoglobin


Concent 


  


  32.5 


  


 


 


Red Cell Distribution Width   17.2  


 


Platelet Count   165  


 


Mean Platelet Volume   8.3  


 


Neutrophils (%) (Auto)   28.8  


 


Lymphocytes (%) (Auto)   16.8  


 


Monocytes (%) (Auto)   7.8  


 


Eosinophils (%) (Auto)   45.6  


 


Basophils (%) (Auto)   1.0  


 


Neutrophils # (Auto)   2.6  


 


Lymphocytes # (Auto)   1.5  


 


Monocytes # (Auto)   0.7  


 


Eosinophils # (Auto)   4.1  


 


Basophils # (Auto)   0.1  


 


CBC Comment   DIFF FINAL  


 


Differential Comment     


 


Prothrombin Time   11.1  


 


Prothromb Time International


Ratio 


  


  1.1 


  


 


 


Blood Urea Nitrogen   9  


 


Creatinine   0.93  


 


Random Glucose   121  


 


Total Protein   8.7  


 


Albumin   3.1  


 


Calcium Level   9.0  


 


Alkaline Phosphatase   189  


 


Aspartate Amino Transf


(AST/SGOT) 


  


  25 


  


 


 


Alanine Aminotransferase


(ALT/SGPT) 


  


  22 


  


 


 


Total Bilirubin   0.3  


 


Sodium Level   133  


 


Potassium Level   4.5  


 


Chloride Level   97  


 


Carbon Dioxide Level   32.8  


 


Anion Gap   3  


 


Estimat Glomerular Filtration


Rate 


  


  97 


  


 


 


Total Creatine Kinase   225  


 


Troponin I   0.04  


 


Thyroid Stimulating Hormone


3rd Gen 


  


  3.730 


  


 


 


Urine Color    YELLOW 


 


Urine Turbidity    CLEAR 


 


Urine pH    5.0 


 


Urine Specific Gravity    1.025 


 


Urine Protein    TRACE 


 


Urine Glucose (UA)    NEG 


 


Urine Ketones    NEG 


 


Urine Occult Blood    NEG 


 


Urine Nitrite    NEG 


 


Urine Bilirubin    NEG 


 


Urine Urobilinogen    LESS THAN 2.0 


 


Urine Leukocyte Esterase    NEG 


 


Urine RBC    1 


 


Urine WBC    LESS THAN 1 


 


Urine Bacteria    RARE 


 


Urine Hyaline Casts    3 


 


Urine Mucus    FEW 


 


Microscopic Urinalysis Comment


  


  


  


  CATH-CULTURE


IND


 


Test


  12/7/17


23:48 12/8/17


03:32 12/8/17


04:46 


 


 


Lactic Acid Level 2.5   3.1  


 


Nasal Screen MRSA (PCR)


  


  MRSA NOT


DETECTED 


  


 


 


Blood Urea Nitrogen   8  


 


Creatinine   0.77  


 


Random Glucose   130  


 


Total Protein   7.6  


 


Albumin   2.9  


 


Calcium Level   8.2  


 


Phosphorus Level   2.4  


 


Magnesium Level   1.5  


 


Alkaline Phosphatase   159  


 


Aspartate Amino Transf


(AST/SGOT) 


  


  22 


  


 


 


Alanine Aminotransferase


(ALT/SGPT) 


  


  23 


  


 


 


Total Bilirubin   0.3  


 


Sodium Level   137  


 


Potassium Level   4.1  


 


Chloride Level   100  


 


Carbon Dioxide Level   29.5  


 


Anion Gap   8  


 


Estimat Glomerular Filtration


Rate 


  


  120 


  


 


 


Troponin I   0.02  














 Date/Time


Source Procedure


Growth Status


 


 


 12/7/17 20:25


Blood Peripheral Aerobic Blood Culture


Pending Received


 


 12/7/17 20:25


Blood Peripheral Anaerobic Blood Culture


Pending Received





 12/7/17 20:45


Urine Catheterized Urine Legionella Antigen


Pending Received


 


 12/7/17 20:45


Urine Catheterized Urine Streptococcus pneumoniae Antigen (M


Pending Received








Result Diagram:  


12/7/17 2025 12/8/17 0446





Imaging





Last Impressions








Head CT 12/7/17 2002 Signed





Impressions: 





 Service Date/Time:  Thursday, December 7, 2017 21:05 - CONCLUSION:  1. 

Moderate 





 periventricular and subcortical white matter small vessel ischemic changes 





 bilaterally.  2. Diffuse cerebral atrophy is stable.  3. No acute infarct, 

acute 





 hemorrhage, mass effect or extra-axial fluid collections.  4. Tiny air-fluid 





 level within the right maxillary sinus and diffuse mucosal thickening 

involving 





 all of the paranasal sinuses     Adam Zambrano MD 


 


Chest X-Ray 12/7/17 2002 Signed





Impressions: 





 Service Date/Time:  Thursday, December 7, 2017 20:27 - CONCLUSION:  1. No 

acute 





 cardiopulmonary disease.     Walter Russell MD 











Assessment and Plan


Assessment and Plan


Lactic acidosis


   - improving


   possible  sepsis


Pt is critical, BP is unstable


Confusion ? is sepsis contributing 


H/o bacteremia 2/2 ESBL + organism in May





   cont vancomycin, 


   start meropenem


   fu blood clx


   - will get CT Abd/pel with non iodine containing contrast


Discussed Condition With


Dr Tilley 


RN


pharmacist


radiology dptmt











Lorrie Kim MD Dec 8, 2017 11:10

## 2017-12-08 NOTE — HHI.CCPN
Subjective


Remarks/Hospital Course


72-year-old male came to the emergency room sent from the nursing home with 

altered mental status this progressively worsening over past couple days.  He 

was found today by nursing home staff to be unresponsive and in respiratory 

distress.  EMS was called.  Upon arrival they noticed that patient was 

wheezing.  Patient was given albuterol nebulizer and then was brought here.  

His oxygen saturation initially was in the high 80s on 2 L of oxygen via nasal 

cannula that's his daily requirement.  After the nebulizer oxygen saturation 

improved to 95% on 2 L.  Patient arrived with a GCS of 8.  Patient is a full 

code.  After nebulizer treatment and improve oxygenation his mental status 

thoughts improving.  Patient is able to protect his airways.


Subjective:


12/8: Patient continues to have altered mental status, formal swallow 

evaluation pending.  Lactate remains elevated, expansion of antibiotics this 

a.m.. O2 saturation remains WNL.





Objective





Vital Signs








  Date Time  Temp Pulse Resp B/P (MAP) Pulse Ox O2 Delivery O2 Flow Rate FiO2


 


12/8/17 07:45     96 Nasal Cannula 2.00 


 


12/8/17 06:00  96      


 


12/8/17 04:00 99.0  23 91/65 (74)    














Intake and Output   


 


 12/8/17 12/8/17 12/9/17





 08:00 16:00 00:00


 


Intake Total 1600 ml  


 


Output Total 1300 ml  


 


Balance 300 ml  








Result Diagram:  


12/7/17 2025 12/8/17 0446





Other Results





Laboratory Tests








Test


  12/7/17


20:16


 


Blood Gas Puncture Site RT BRACHIAL 


 


Blood Gas Patient Temperature 37.0 


 


Blood Gas HCO3


  33 mmol/L


(22-26)


 


Blood Gas Base Excess


  6.7 mmol/L


(-2-2)


 


Blood Gas Oxygen Saturation 96 % () 


 


Arterial Blood pH


  7.32


(7.380-7.420)


 


Arterial Blood Partial


Pressure CO2 65 mmHg


(38-42)


 


Arterial Blood Partial


Pressure O2 95 mmHG


()


 


Arterial Blood Oxygen Content


  17.3 Vol %


(12.0-20.0)


 


Arterial Blood


Carboxyhemoglobin 0.9 % (0-4) 


 


 


Arterial Blood Methemoglobin 0.4 % (0-2) 


 


Blood Gas Hemoglobin


  12.8 G/DL


(12.0-16.0)


 


Oxygen Delivery Device NASAL CANNULA 


 


Blood Gas Liter Flow 2 L/M 








Imaging





Last 24 hours Impressions








Head CT 12/7/17 2002 Signed





Impressions: 





 Service Date/Time:  Thursday, December 7, 2017 21:05 - CONCLUSION:  1. 

Moderate 





 periventricular and subcortical white matter small vessel ischemic changes 





 bilaterally.  2. Diffuse cerebral atrophy is stable.  3. No acute infarct, 

acute 





 hemorrhage, mass effect or extra-axial fluid collections.  4. Tiny air-fluid 





 level within the right maxillary sinus and diffuse mucosal thickening 

involving 





 all of the paranasal sinuses     Adam Zambrano MD 


 


Chest X-Ray 12/7/17 2002 Signed





Impressions: 





 Service Date/Time:  Thursday, December 7, 2017 20:27 - CONCLUSION:  1. No 

acute 





 cardiopulmonary disease.     Walter Russell MD 








Objective Remarks


GENERAL: Lethargic cachectic  chronically ill appearing man in 

no acute distress, confused


SKIN: Focused skin assessment warm/dry.


HEAD: Atraumatic. Normocephalic. 


EYES: Pupils equal and round. No scleral icterus. No injection or drainage. 


ENT: No nasal bleeding or discharge.  Mucous membranes pink and moist.


NECK: Trachea midline. No JVD. 


CARDIOVASCULAR: Regular rate and rhythm.  No murmur appreciated.


RESPIRATORY: No accessory muscles , clear to auscultation bilaterally


GASTROINTESTINAL: Abdomen soft, non-tender, nondistended. Hepatic and splenic 

margins not palpable. 


MUSCULOSKELETAL: No obvious deformities. No clubbing.  No cyanosis.  No edema. 


NEUROLOGICAL: GCS 14


PSYCHIATRIC: Confused





A/P


Assessment and Plan


Respiratory failure


- Underlying COPD and asthma


- DuoNeb scheduled and when necessary


- No wheezing appreciated on exam we'll hold on steroids


- Broad-spectrum antibiotic (currently on Azithromycin)


- Cultures and de-escalate per sensitivity


- Urine antigens


-12/8:Chest x-ray no acute process





Sepsis


- Monitor lactic acid 2.5->3.1


- Aggressive IV fluids resuscitation, /hr


- Broad-spectrum antibiotics and follow-up culture-begin cefepime and Flagyl, 

UA suggestive of infection-pending culture


- H/O ESBL 05/17 previously on meropenem








History of hypertension


- Hold antihypertensive meds  (parameters provided) in a patient of early sepsis


- Resume when indicated





Anxiety


- Lorazepam when necessary for agitation





Tobacco use disorder


- Cessation when neurologically improved





Altered mental status


Metabolic encephalopathy


- CT head negative


- Most likely due to underlying infection


-Consider LP


-Monitor BMP, initiate electrolyte replacement protocol


-Glucose monitoring per ICU protocol


-Obtain formal swallow


- Supportive care





DVT GI prophylaxis


- Teds SCDs


- Subcutaneous heparin and Pepcid





Dispo: 


Level 3


Discussed with ICU RN at bedside (Marilyn)


Physician


Maryana Vance MD Dec 8, 2017 09:23

## 2017-12-09 VITALS — TEMPERATURE: 98.1 F | HEART RATE: 87 BPM

## 2017-12-09 VITALS
OXYGEN SATURATION: 97 % | DIASTOLIC BLOOD PRESSURE: 60 MMHG | RESPIRATION RATE: 16 BRPM | SYSTOLIC BLOOD PRESSURE: 130 MMHG | HEART RATE: 75 BPM | TEMPERATURE: 98.4 F

## 2017-12-09 VITALS
HEART RATE: 110 BPM | DIASTOLIC BLOOD PRESSURE: 79 MMHG | SYSTOLIC BLOOD PRESSURE: 152 MMHG | RESPIRATION RATE: 22 BRPM | TEMPERATURE: 98.4 F | OXYGEN SATURATION: 100 %

## 2017-12-09 VITALS — HEART RATE: 90 BPM | TEMPERATURE: 97.6 F

## 2017-12-09 VITALS — HEART RATE: 81 BPM

## 2017-12-09 VITALS
DIASTOLIC BLOOD PRESSURE: 78 MMHG | RESPIRATION RATE: 24 BRPM | HEART RATE: 91 BPM | SYSTOLIC BLOOD PRESSURE: 138 MMHG | TEMPERATURE: 98.6 F

## 2017-12-09 VITALS — HEART RATE: 84 BPM | RESPIRATION RATE: 26 BRPM

## 2017-12-09 VITALS — HEART RATE: 84 BPM | SYSTOLIC BLOOD PRESSURE: 137 MMHG | RESPIRATION RATE: 24 BRPM | DIASTOLIC BLOOD PRESSURE: 75 MMHG

## 2017-12-09 VITALS — HEART RATE: 72 BPM

## 2017-12-09 VITALS — HEART RATE: 107 BPM

## 2017-12-09 VITALS — HEART RATE: 85 BPM

## 2017-12-09 LAB
ALP SERPL-CCNC: 122 U/L (ref 45–117)
ALT SERPL-CCNC: 18 U/L (ref 12–78)
ANION GAP SERPL CALC-SCNC: 7 MEQ/L (ref 5–15)
AST SERPL-CCNC: 18 U/L (ref 15–37)
BASOPHILS # BLD AUTO: 0.1 TH/MM3 (ref 0–0.2)
BASOPHILS NFR BLD: 1.2 % (ref 0–2)
BILIRUB SERPL-MCNC: 0.3 MG/DL (ref 0.2–1)
BUN SERPL-MCNC: 8 MG/DL (ref 7–18)
CHLORIDE SERPL-SCNC: 106 MEQ/L (ref 98–107)
EOSINOPHIL # BLD: 1.1 TH/MM3 (ref 0–0.4)
EOSINOPHIL NFR BLD: 14.2 % (ref 0–4)
ERYTHROCYTE [DISTWIDTH] IN BLOOD BY AUTOMATED COUNT: 16.6 % (ref 11.6–17.2)
GFR SERPLBLD BASED ON 1.73 SQ M-ARVRAT: 124 ML/MIN (ref 89–?)
HCO3 BLD-SCNC: 27.9 MEQ/L (ref 21–32)
HCT VFR BLD CALC: 33.8 % (ref 39–51)
HEMO FLAGS: (no result)
LYMPHOCYTES # BLD AUTO: 2.6 TH/MM3 (ref 1–4.8)
LYMPHOCYTES NFR BLD AUTO: 32.8 % (ref 9–44)
MAGNESIUM SERPL-MCNC: 1.9 MG/DL (ref 1.5–2.5)
MCH RBC QN AUTO: 26.5 PG (ref 27–34)
MCHC RBC AUTO-ENTMCNC: 32.5 % (ref 32–36)
MCV RBC AUTO: 81.5 FL (ref 80–100)
MONOCYTES NFR BLD: 9.8 % (ref 0–8)
NEUTROPHILS # BLD AUTO: 3.3 TH/MM3 (ref 1.8–7.7)
NEUTROPHILS NFR BLD AUTO: 42 % (ref 16–70)
PLATELET # BLD: 168 TH/MM3 (ref 150–450)
POTASSIUM SERPL-SCNC: 3.7 MEQ/L (ref 3.5–5.1)
RBC # BLD AUTO: 4.15 MIL/MM3 (ref 4.5–5.9)
SODIUM SERPL-SCNC: 141 MEQ/L (ref 136–145)
WBC # BLD AUTO: 7.9 TH/MM3 (ref 4–11)

## 2017-12-09 RX ADMIN — PHENYTOIN SODIUM SCH MLS/HR: 50 INJECTION INTRAMUSCULAR; INTRAVENOUS at 05:42

## 2017-12-09 RX ADMIN — IPRATROPIUM BROMIDE AND ALBUTEROL SULFATE SCH AMPULE: .5; 3 SOLUTION RESPIRATORY (INHALATION) at 04:00

## 2017-12-09 RX ADMIN — Medication SCH ML: at 09:00

## 2017-12-09 RX ADMIN — PHENYTOIN SODIUM SCH MLS/HR: 50 INJECTION INTRAMUSCULAR; INTRAVENOUS at 17:47

## 2017-12-09 RX ADMIN — IPRATROPIUM BROMIDE AND ALBUTEROL SULFATE SCH AMPULE: .5; 3 SOLUTION RESPIRATORY (INHALATION) at 09:44

## 2017-12-09 RX ADMIN — SODIUM CHLORIDE SCH MLS/HR: 900 INJECTION INTRAVENOUS at 12:43

## 2017-12-09 RX ADMIN — FAMOTIDINE SCH MG: 10 INJECTION, SOLUTION INTRAVENOUS at 09:00

## 2017-12-09 RX ADMIN — SODIUM CHLORIDE SCH MLS/HR: 900 INJECTION INTRAVENOUS at 09:19

## 2017-12-09 RX ADMIN — CETIRIZINE HYDROCHLORIDE SCH MG: 10 TABLET, FILM COATED ORAL at 09:03

## 2017-12-09 RX ADMIN — SODIUM CHLORIDE SCH MLS/HR: 900 INJECTION INTRAVENOUS at 00:57

## 2017-12-09 RX ADMIN — STANDARDIZED SENNA CONCENTRATE AND DOCUSATE SODIUM SCH TAB: 8.6; 5 TABLET, FILM COATED ORAL at 09:03

## 2017-12-09 RX ADMIN — HEPARIN SODIUM SCH UNITS: 10000 INJECTION, SOLUTION INTRAVENOUS; SUBCUTANEOUS at 17:46

## 2017-12-09 RX ADMIN — FAMOTIDINE SCH MG: 10 INJECTION, SOLUTION INTRAVENOUS at 19:38

## 2017-12-09 RX ADMIN — SODIUM CHLORIDE TAB 1 GM SCH GM: 1 TAB at 12:43

## 2017-12-09 RX ADMIN — INSULIN ASPART SCH: 100 INJECTION, SOLUTION INTRAVENOUS; SUBCUTANEOUS at 20:22

## 2017-12-09 RX ADMIN — INSULIN ASPART SCH: 100 INJECTION, SOLUTION INTRAVENOUS; SUBCUTANEOUS at 08:00

## 2017-12-09 RX ADMIN — HEPARIN SODIUM SCH UNITS: 10000 INJECTION, SOLUTION INTRAVENOUS; SUBCUTANEOUS at 00:00

## 2017-12-09 RX ADMIN — CHLORHEXIDINE GLUCONATE SCH PACK: 500 CLOTH TOPICAL at 04:00

## 2017-12-09 RX ADMIN — IPRATROPIUM BROMIDE AND ALBUTEROL SULFATE SCH AMPULE: .5; 3 SOLUTION RESPIRATORY (INHALATION) at 12:00

## 2017-12-09 RX ADMIN — STANDARDIZED SENNA CONCENTRATE AND DOCUSATE SODIUM SCH TAB: 8.6; 5 TABLET, FILM COATED ORAL at 19:38

## 2017-12-09 RX ADMIN — IPRATROPIUM BROMIDE AND ALBUTEROL SULFATE SCH AMPULE: .5; 3 SOLUTION RESPIRATORY (INHALATION) at 00:21

## 2017-12-09 RX ADMIN — METOPROLOL SUCCINATE SCH MG: 50 TABLET, FILM COATED, EXTENDED RELEASE ORAL at 09:00

## 2017-12-09 RX ADMIN — ATORVASTATIN CALCIUM SCH MG: 80 TABLET, FILM COATED ORAL at 19:38

## 2017-12-09 RX ADMIN — SODIUM CHLORIDE TAB 1 GM SCH GM: 1 TAB at 17:47

## 2017-12-09 RX ADMIN — IPRATROPIUM BROMIDE AND ALBUTEROL SULFATE SCH AMPULE: .5; 3 SOLUTION RESPIRATORY (INHALATION) at 16:00

## 2017-12-09 RX ADMIN — SODIUM CHLORIDE SCH MLS/HR: 900 INJECTION INTRAVENOUS at 01:56

## 2017-12-09 RX ADMIN — IPRATROPIUM BROMIDE AND ALBUTEROL SULFATE SCH AMPULE: .5; 3 SOLUTION RESPIRATORY (INHALATION) at 20:37

## 2017-12-09 RX ADMIN — SODIUM CHLORIDE SCH MLS/HR: 900 INJECTION INTRAVENOUS at 17:45

## 2017-12-09 RX ADMIN — AZITHROMYCIN SCH MLS/HR: 500 INJECTION, POWDER, LYOPHILIZED, FOR SOLUTION INTRAVENOUS at 00:14

## 2017-12-09 RX ADMIN — ASPIRIN SCH MG: 81 TABLET ORAL at 09:03

## 2017-12-09 RX ADMIN — PANTOPRAZOLE SCH MG: 40 TABLET, DELAYED RELEASE ORAL at 09:03

## 2017-12-09 RX ADMIN — HEPARIN SODIUM SCH UNITS: 10000 INJECTION, SOLUTION INTRAVENOUS; SUBCUTANEOUS at 08:00

## 2017-12-09 RX ADMIN — SODIUM CHLORIDE TAB 1 GM SCH GM: 1 TAB at 09:04

## 2017-12-09 RX ADMIN — INSULIN ASPART SCH: 100 INJECTION, SOLUTION INTRAVENOUS; SUBCUTANEOUS at 12:00

## 2017-12-09 RX ADMIN — Medication SCH ML: at 19:38

## 2017-12-09 RX ADMIN — INSULIN ASPART SCH: 100 INJECTION, SOLUTION INTRAVENOUS; SUBCUTANEOUS at 17:00

## 2017-12-09 NOTE — HHI.CCPN
Subjective


Remarks/Hospital Course


72-year-old male came to the emergency room sent from the nursing home with 

altered mental status this progressively worsening over past couple days.  He 

was found today by nursing home staff to be unresponsive and in respiratory 

distress.  EMS was called.  Upon arrival they noticed that patient was 

wheezing.  Patient was given albuterol nebulizer and then was brought here.  

His oxygen saturation initially was in the high 80s on 2 L of oxygen via nasal 

cannula that's his daily requirement.  After the nebulizer oxygen saturation 

improved to 95% on 2 L.  Patient arrived with a GCS of 8.  Patient is a full 

code.  After nebulizer treatment and improve oxygenation his mental status 

thoughts improving.  Patient is able to protect his airways.


Subjective:


12/8: Patient continues to have altered mental status, formal swallow 

evaluation pending.  Lactate remains elevated, expansion of antibiotics this 

a.m.. O2 saturation remains WNL.


12/9: Afebrile.  O2 saturation 86210 percent on room air.  Patient tolerating a 

diet.  Patient remains confused, hard restraints for safety.





Objective





Vital Signs








  Date Time  Temp Pulse Resp B/P (MAP) Pulse Ox O2 Delivery O2 Flow Rate FiO2


 


12/9/17 14:00  107      


 


12/9/17 12:00 98.1       


 


12/9/17 04:00   22 152/79 (103) 100   


 


12/8/17 20:34      Nasal Cannula 2.00 














Intake and Output   


 


 12/9/17 12/9/17 12/10/17





 08:00 16:00 00:00


 


Intake Total 1807.5 ml  


 


Output Total 901 ml  


 


Balance 906.5 ml  








Result Diagram:  


12/9/17 0805                                                                   

             12/9/17 0509





Other Results





Microbiology








 Date/Time


Source Procedure


Growth Status


 


 


 12/7/17 20:45


Urine Catheterized Urine Legionella Antigen - Final


PRESUMPTIVE NEGATIVE FOR LEGIONELLA P... Complete


 


 12/7/17 20:45


Urine Catheterized Urine Streptococcus pneumoniae Antigen (M - Final


PRESUMPTIVE NEGATIVE FOR STREPTOCOCCU... Complete


 


 12/7/17 20:45


Urine Catheterized Urine Urine Culture - Final


NO GROWTH IN 48 HOURS. Complete








Imaging





Last 24 hours Impressions








Head CT 12/7/17 2002 Signed





Impressions: 





 Service Date/Time:  Thursday, December 7, 2017 21:05 - CONCLUSION:  1. 

Moderate 





 periventricular and subcortical white matter small vessel ischemic changes 





 bilaterally.  2. Diffuse cerebral atrophy is stable.  3. No acute infarct, 

acute 





 hemorrhage, mass effect or extra-axial fluid collections.  4. Tiny air-fluid 





 level within the right maxillary sinus and diffuse mucosal thickening 

involving 





 all of the paranasal sinuses     Adam Zambrano MD 


 


Chest X-Ray 12/7/17 2002 Signed





Impressions: 





 Service Date/Time:  Thursday, December 7, 2017 20:27 - CONCLUSION:  1. No 

acute 





 cardiopulmonary disease.     Walter Russell MD 








Objective Remarks


GENERAL:This is a  cachectic  chronically ill appearing man in 

no acute distress, confused


SKIN: Focused skin assessment warm/dry.


HEAD: Atraumatic. Normocephalic. 


EYES: Pupils equal and round. No scleral icterus. No injection or drainage. 


ENT: No nasal bleeding or discharge.  Mucous membranes pink and moist.


NECK: Trachea midline. No JVD. 


CARDIOVASCULAR: Regular rate and rhythm.  No murmur appreciated.


RESPIRATORY: No accessory muscles , clear to auscultation bilaterally


GASTROINTESTINAL: Abdomen soft, non-tender, nondistended. Hepatic and splenic 

margins not palpable. 


MUSCULOSKELETAL: No obvious deformities. No clubbing.  No cyanosis.  No edema. 


NEUROLOGICAL: GCS 14


PSYCHIATRIC: Confused





A/P


Assessment and Plan


Respiratory failure-resolved


- Underlying COPD and asthma


- DuoNeb scheduled and when necessary


- No wheezing appreciated on exam will hold on steroids


- Broad-spectrum antibiotic (currently on Azithromycin)


- Cultures and de-escalate per sensitivity


- Urine antigens


-12/8:Chest x-ray no acute process





Sepsis


- Monitor lactic acid 2.5->3.1


- Aggressive IV fluids resuscitation, /hr


- Broad-spectrum antibiotics and follow-up culture-begin cefepime and Flagyl, 

UA suggestive of infection-pending culture


- H/O ESBL 05/17 previously on meropenem








History of hypertension


- Hold antihypertensive meds  (parameters provided) in a patient of early sepsis


- Resume when indicated





Anxiety


- Lorazepam when necessary for agitation





Tobacco use disorder


- Cessation when neurologically improved





Altered mental status


Metabolic encephalopathy


- CT head negative


- Most likely due to underlying infection


-Consider LP


-Monitor BMP, initiate electrolyte replacement protocol


-Glucose monitoring per ICU protocol


-Obtain formal swallow


- Supportive care





DVT GI prophylaxis


- Teds SCDs


- Subcutaneous heparin and Pepcid





Dispo: 


Level 2


Discussed with ICU RN at bedside (Marilyn).  Plan transfer to [facility] in a.m..  Patient transferred to [facility] floor upon bed 

availability.


Physician


Maryana Vance MD Dec 9, 2017 17:11

## 2017-12-10 VITALS
SYSTOLIC BLOOD PRESSURE: 130 MMHG | HEART RATE: 97 BPM | TEMPERATURE: 98.6 F | DIASTOLIC BLOOD PRESSURE: 77 MMHG | RESPIRATION RATE: 24 BRPM

## 2017-12-10 VITALS
DIASTOLIC BLOOD PRESSURE: 73 MMHG | RESPIRATION RATE: 23 BRPM | TEMPERATURE: 98.4 F | SYSTOLIC BLOOD PRESSURE: 159 MMHG | HEART RATE: 89 BPM

## 2017-12-10 VITALS
SYSTOLIC BLOOD PRESSURE: 137 MMHG | HEART RATE: 112 BPM | DIASTOLIC BLOOD PRESSURE: 88 MMHG | RESPIRATION RATE: 29 BRPM | TEMPERATURE: 98.7 F | OXYGEN SATURATION: 93 %

## 2017-12-10 VITALS
TEMPERATURE: 98.6 F | RESPIRATION RATE: 35 BRPM | SYSTOLIC BLOOD PRESSURE: 167 MMHG | DIASTOLIC BLOOD PRESSURE: 68 MMHG | HEART RATE: 101 BPM

## 2017-12-10 VITALS — HEART RATE: 102 BPM | DIASTOLIC BLOOD PRESSURE: 78 MMHG | RESPIRATION RATE: 30 BRPM | SYSTOLIC BLOOD PRESSURE: 131 MMHG

## 2017-12-10 VITALS
HEART RATE: 93 BPM | TEMPERATURE: 98 F | RESPIRATION RATE: 25 BRPM | DIASTOLIC BLOOD PRESSURE: 87 MMHG | SYSTOLIC BLOOD PRESSURE: 153 MMHG

## 2017-12-10 VITALS — RESPIRATION RATE: 27 BRPM | HEART RATE: 99 BPM | OXYGEN SATURATION: 91 %

## 2017-12-10 VITALS — SYSTOLIC BLOOD PRESSURE: 147 MMHG | DIASTOLIC BLOOD PRESSURE: 85 MMHG | RESPIRATION RATE: 26 BRPM | HEART RATE: 96 BPM

## 2017-12-10 VITALS — RESPIRATION RATE: 23 BRPM | DIASTOLIC BLOOD PRESSURE: 78 MMHG | SYSTOLIC BLOOD PRESSURE: 154 MMHG | HEART RATE: 87 BPM

## 2017-12-10 VITALS — SYSTOLIC BLOOD PRESSURE: 137 MMHG | HEART RATE: 89 BPM | RESPIRATION RATE: 25 BRPM | DIASTOLIC BLOOD PRESSURE: 75 MMHG

## 2017-12-10 VITALS
DIASTOLIC BLOOD PRESSURE: 75 MMHG | HEART RATE: 108 BPM | SYSTOLIC BLOOD PRESSURE: 145 MMHG | RESPIRATION RATE: 29 BRPM | TEMPERATURE: 98.5 F | OXYGEN SATURATION: 94 %

## 2017-12-10 VITALS — DIASTOLIC BLOOD PRESSURE: 81 MMHG | SYSTOLIC BLOOD PRESSURE: 148 MMHG | HEART RATE: 110 BPM | RESPIRATION RATE: 31 BRPM

## 2017-12-10 VITALS — OXYGEN SATURATION: 93 %

## 2017-12-10 VITALS — HEART RATE: 96 BPM

## 2017-12-10 VITALS — HEART RATE: 86 BPM

## 2017-12-10 VITALS — HEART RATE: 100 BPM

## 2017-12-10 VITALS — HEART RATE: 88 BPM

## 2017-12-10 LAB
ALP SERPL-CCNC: 124 U/L (ref 45–117)
ALT SERPL-CCNC: 20 U/L (ref 12–78)
ANION GAP SERPL CALC-SCNC: 7 MEQ/L (ref 5–15)
AST SERPL-CCNC: 25 U/L (ref 15–37)
BASOPHILS # BLD AUTO: 0.3 TH/MM3 (ref 0–0.2)
BASOPHILS NFR BLD: 2.9 % (ref 0–2)
BILIRUB SERPL-MCNC: 0.5 MG/DL (ref 0.2–1)
BUN SERPL-MCNC: 4 MG/DL (ref 7–18)
CHLORIDE SERPL-SCNC: 107 MEQ/L (ref 98–107)
EOSINOPHIL # BLD: 2.1 TH/MM3 (ref 0–0.4)
EOSINOPHIL NFR BLD: 23.8 % (ref 0–4)
ERYTHROCYTE [DISTWIDTH] IN BLOOD BY AUTOMATED COUNT: 16.9 % (ref 11.6–17.2)
GFR SERPLBLD BASED ON 1.73 SQ M-ARVRAT: 151 ML/MIN (ref 89–?)
HCO3 BLD-SCNC: 25.6 MEQ/L (ref 21–32)
HCT VFR BLD CALC: 34 % (ref 39–51)
HEMO FLAGS: (no result)
LYMPHOCYTES # BLD AUTO: 2.9 TH/MM3 (ref 1–4.8)
LYMPHOCYTES NFR BLD AUTO: 33.5 % (ref 9–44)
MAGNESIUM SERPL-MCNC: 1.5 MG/DL (ref 1.5–2.5)
MCH RBC QN AUTO: 27.5 PG (ref 27–34)
MCHC RBC AUTO-ENTMCNC: 34 % (ref 32–36)
MCV RBC AUTO: 81 FL (ref 80–100)
MONOCYTES NFR BLD: 9 % (ref 0–8)
NEUTROPHILS # BLD AUTO: 2.7 TH/MM3 (ref 1.8–7.7)
NEUTROPHILS NFR BLD AUTO: 30.8 % (ref 16–70)
PLATELET # BLD: 178 TH/MM3 (ref 150–450)
POTASSIUM SERPL-SCNC: 3.4 MEQ/L (ref 3.5–5.1)
POTASSIUM SERPL-SCNC: 3.6 MEQ/L (ref 3.5–5.1)
RBC # BLD AUTO: 4.19 MIL/MM3 (ref 4.5–5.9)
SODIUM SERPL-SCNC: 140 MEQ/L (ref 136–145)
T4 FREE SERPL-MCNC: 1.81 NG/DL (ref 0.76–1.46)
WBC # BLD AUTO: 8.6 TH/MM3 (ref 4–11)

## 2017-12-10 RX ADMIN — STANDARDIZED SENNA CONCENTRATE AND DOCUSATE SODIUM SCH TAB: 8.6; 5 TABLET, FILM COATED ORAL at 20:01

## 2017-12-10 RX ADMIN — IPRATROPIUM BROMIDE AND ALBUTEROL SULFATE SCH AMPULE: .5; 3 SOLUTION RESPIRATORY (INHALATION) at 00:15

## 2017-12-10 RX ADMIN — SODIUM CHLORIDE SCH MLS/HR: 900 INJECTION INTRAVENOUS at 04:11

## 2017-12-10 RX ADMIN — INSULIN ASPART SCH: 100 INJECTION, SOLUTION INTRAVENOUS; SUBCUTANEOUS at 20:16

## 2017-12-10 RX ADMIN — IPRATROPIUM BROMIDE AND ALBUTEROL SULFATE SCH AMPULE: .5; 3 SOLUTION RESPIRATORY (INHALATION) at 19:36

## 2017-12-10 RX ADMIN — IPRATROPIUM BROMIDE AND ALBUTEROL SULFATE SCH AMPULE: .5; 3 SOLUTION RESPIRATORY (INHALATION) at 14:34

## 2017-12-10 RX ADMIN — PHENYTOIN SODIUM SCH MLS/HR: 50 INJECTION INTRAMUSCULAR; INTRAVENOUS at 15:32

## 2017-12-10 RX ADMIN — INSULIN ASPART SCH: 100 INJECTION, SOLUTION INTRAVENOUS; SUBCUTANEOUS at 16:34

## 2017-12-10 RX ADMIN — CETIRIZINE HYDROCHLORIDE SCH MG: 10 TABLET, FILM COATED ORAL at 08:16

## 2017-12-10 RX ADMIN — PHENYTOIN SODIUM SCH MLS/HR: 50 INJECTION INTRAMUSCULAR; INTRAVENOUS at 07:37

## 2017-12-10 RX ADMIN — IPRATROPIUM BROMIDE AND ALBUTEROL SULFATE SCH AMPULE: .5; 3 SOLUTION RESPIRATORY (INHALATION) at 08:00

## 2017-12-10 RX ADMIN — ASPIRIN SCH MG: 81 TABLET ORAL at 08:06

## 2017-12-10 RX ADMIN — SODIUM CHLORIDE SCH MLS/HR: 900 INJECTION INTRAVENOUS at 14:46

## 2017-12-10 RX ADMIN — AZITHROMYCIN SCH MLS/HR: 500 INJECTION, POWDER, LYOPHILIZED, FOR SOLUTION INTRAVENOUS at 00:09

## 2017-12-10 RX ADMIN — SODIUM CHLORIDE SCH MLS/HR: 900 INJECTION INTRAVENOUS at 08:03

## 2017-12-10 RX ADMIN — IPRATROPIUM BROMIDE AND ALBUTEROL SULFATE SCH AMPULE: .5; 3 SOLUTION RESPIRATORY (INHALATION) at 03:29

## 2017-12-10 RX ADMIN — PANTOPRAZOLE SCH MG: 40 TABLET, DELAYED RELEASE ORAL at 08:06

## 2017-12-10 RX ADMIN — INSULIN ASPART SCH: 100 INJECTION, SOLUTION INTRAVENOUS; SUBCUTANEOUS at 08:00

## 2017-12-10 RX ADMIN — SODIUM CHLORIDE TAB 1 GM SCH GM: 1 TAB at 17:09

## 2017-12-10 RX ADMIN — ASPIRIN SCH MG: 81 TABLET ORAL at 08:57

## 2017-12-10 RX ADMIN — HEPARIN SODIUM SCH UNITS: 10000 INJECTION, SOLUTION INTRAVENOUS; SUBCUTANEOUS at 00:08

## 2017-12-10 RX ADMIN — INSULIN ASPART SCH: 100 INJECTION, SOLUTION INTRAVENOUS; SUBCUTANEOUS at 11:52

## 2017-12-10 RX ADMIN — CETIRIZINE HYDROCHLORIDE SCH MG: 10 TABLET, FILM COATED ORAL at 08:57

## 2017-12-10 RX ADMIN — Medication SCH ML: at 08:08

## 2017-12-10 RX ADMIN — PANTOPRAZOLE SCH MG: 40 TABLET, DELAYED RELEASE ORAL at 08:16

## 2017-12-10 RX ADMIN — FAMOTIDINE SCH MG: 10 INJECTION, SOLUTION INTRAVENOUS at 08:05

## 2017-12-10 RX ADMIN — STANDARDIZED SENNA CONCENTRATE AND DOCUSATE SODIUM SCH TAB: 8.6; 5 TABLET, FILM COATED ORAL at 08:16

## 2017-12-10 RX ADMIN — HEPARIN SODIUM SCH UNITS: 10000 INJECTION, SOLUTION INTRAVENOUS; SUBCUTANEOUS at 15:32

## 2017-12-10 RX ADMIN — CHLORHEXIDINE GLUCONATE SCH PACK: 500 CLOTH TOPICAL at 04:00

## 2017-12-10 RX ADMIN — SODIUM CHLORIDE TAB 1 GM SCH GM: 1 TAB at 11:52

## 2017-12-10 RX ADMIN — FAMOTIDINE SCH MG: 10 INJECTION, SOLUTION INTRAVENOUS at 20:00

## 2017-12-10 RX ADMIN — Medication SCH ML: at 20:01

## 2017-12-10 RX ADMIN — SODIUM CHLORIDE SCH MLS/HR: 900 INJECTION INTRAVENOUS at 01:19

## 2017-12-10 RX ADMIN — SODIUM CHLORIDE TAB 1 GM SCH GM: 1 TAB at 08:08

## 2017-12-10 RX ADMIN — STANDARDIZED SENNA CONCENTRATE AND DOCUSATE SODIUM SCH TAB: 8.6; 5 TABLET, FILM COATED ORAL at 08:06

## 2017-12-10 RX ADMIN — ASPIRIN SCH MG: 81 TABLET ORAL at 08:16

## 2017-12-10 RX ADMIN — CETIRIZINE HYDROCHLORIDE SCH MG: 10 TABLET, FILM COATED ORAL at 08:08

## 2017-12-10 RX ADMIN — HEPARIN SODIUM SCH UNITS: 10000 INJECTION, SOLUTION INTRAVENOUS; SUBCUTANEOUS at 08:07

## 2017-12-10 RX ADMIN — SODIUM CHLORIDE SCH MLS/HR: 900 INJECTION INTRAVENOUS at 17:09

## 2017-12-10 RX ADMIN — METOPROLOL SUCCINATE SCH MG: 50 TABLET, FILM COATED, EXTENDED RELEASE ORAL at 08:16

## 2017-12-10 RX ADMIN — ATORVASTATIN CALCIUM SCH MG: 80 TABLET, FILM COATED ORAL at 20:00

## 2017-12-10 RX ADMIN — METOPROLOL SUCCINATE SCH MG: 50 TABLET, FILM COATED, EXTENDED RELEASE ORAL at 08:06

## 2017-12-10 RX ADMIN — IPRATROPIUM BROMIDE AND ALBUTEROL SULFATE SCH AMPULE: .5; 3 SOLUTION RESPIRATORY (INHALATION) at 11:36

## 2017-12-10 NOTE — HHI.CCPN
Subjective


Remarks/Hospital Course


72-year-old male came to the emergency room sent from the nursing home with 

altered mental status this progressively worsening over past couple days.  He 

was found today by nursing home staff to be unresponsive and in respiratory 

distress.  EMS was called.  Upon arrival they noticed that patient was 

wheezing.  Patient was given albuterol nebulizer and then was brought here.  

His oxygen saturation initially was in the high 80s on 2 L of oxygen via nasal 

cannula that's his daily requirement.  After the nebulizer oxygen saturation 

improved to 95% on 2 L.  Patient arrived with a GCS of 8.  Patient is a full 

code.  After nebulizer treatment and improve oxygenation his mental status 

thoughts improving.  Patient is able to protect his airways.


Subjective:


12/8: Patient continues to have altered mental status, formal swallow 

evaluation pending.  Lactate remains elevated, expansion of antibiotics this 

a.m.. O2 saturation remains WNL.


12/9: Afebrile.  O2 saturation 18376 percent on room air.  Patient tolerating a 

diet.  Patient remains confused, hard restraints for safety.





12/10: No acute events reported overnight.  Patient remains encephalopathic, 

confused.  Awaiting transfer to floor





Objective





Vital Signs








  Date Time  Temp Pulse Resp B/P (MAP) Pulse Ox O2 Delivery O2 Flow Rate FiO2


 


12/10/17 06:00  86      


 


12/10/17 04:00 98.6  35 167/68 (101)    


 


12/9/17 20:37        21


 


12/9/17 04:00     100   


 


12/8/17 20:34      Nasal Cannula 2.00 














Intake and Output   


 


 12/10/17 12/10/17 12/11/17





 08:00 16:00 00:00


 


Intake Total 650 ml  


 


Output Total 2250 ml  


 


Balance -1600 ml  








Result Diagram:  


12/10/17 0520                                                                  

              12/10/17 0520





Other Results





Microbiology








 Date/Time


Source Procedure


Growth Status


 


 


 12/9/17 18:10


Nasal Washing Influenza Types A,B Antigen (JAQUELIN) - Final


NEGATIVE FOR FLU A AND B ANTIGEN.... Complete





 12/7/17 20:45


Urine Catheterized Urine Legionella Antigen - Final


PRESUMPTIVE NEGATIVE FOR LEGIONELLA P... Complete


 


 12/7/17 20:45


Urine Catheterized Urine Streptococcus pneumoniae Antigen (M - Final


PRESUMPTIVE NEGATIVE FOR STREPTOCOCCU... Complete


 


 12/7/17 20:45


Urine Catheterized Urine Urine Culture - Final


NO GROWTH IN 48 HOURS. Complete








Imaging





Last 24 hours Impressions








Head CT 12/7/17 2002 Signed





Impressions: 





 Service Date/Time:  Thursday, December 7, 2017 21:05 - CONCLUSION:  1. 

Moderate 





 periventricular and subcortical white matter small vessel ischemic changes 





 bilaterally.  2. Diffuse cerebral atrophy is stable.  3. No acute infarct, 

acute 





 hemorrhage, mass effect or extra-axial fluid collections.  4. Tiny air-fluid 





 level within the right maxillary sinus and diffuse mucosal thickening 

involving 





 all of the paranasal sinuses     Adam Zambrano MD 


 


Chest X-Ray 12/7/17 2002 Signed





Impressions: 





 Service Date/Time:  Thursday, December 7, 2017 20:27 - CONCLUSION:  1. No 

acute 





 cardiopulmonary disease.     Walter Russell MD 








Objective Remarks


GENERAL:This is a  cachectic  chronically ill appearing man 

encephalopathic


SKIN: Warm/dry.


HEAD: Atraumatic. Normocephalic. 


EYES: Pupils equal and round. No scleral icterus. No injection or drainage. 


ENT: No nasal bleeding or discharge. 


NECK: Trachea midline. No JVD. 


CARDIOVASCULAR: Regular rate and rhythm.  No murmur appreciated.


RESPIRATORY: No accessory muscles, clear to auscultation bilaterally


GASTROINTESTINAL: Abdomen soft, non-tender, nondistended. Hepatic and splenic 

margins not palpable. 


MUSCULOSKELETAL: No obvious deformities. No clubbing.  No cyanosis.  No edema. 


NEUROLOGICAL: Patient is confused, unable to follow commands but awakened moves 

all extremities spontaneously.





A/P


Assessment and Plan


Respiratory failure-resolved


- Underlying COPD and asthma


- DuoNeb scheduled and when necessary


- Broad-spectrum antibiotic (currently on Azithromycin)


- Cultures and de-escalate per sensitivity


- Urine Legionella and pneumococcal antigen negative


-12/8:Chest x-ray no acute process





Sepsis


- Monitor lactic acid 2.5->3.1. Now 1.4


- Aggressive IV fluids resuscitation, /hr-reduce to 40 ml per hour


- Broad-spectrum antibiotics and follow-up culture


- ID following on. H/o bacteremia 2/2 ESBL organism in 5/17y. Currently on 

vancomycin, meropenem, azithromycin per ID


- All culture negative to date. Consolidate ABX -defer to ID





History of hypertension


- Hypotension resolved on antihypertensives resumed





Anxiety


- Lorazepam when necessary for agitation





Tobacco use disorder


- Cessation when neurologically improved





Altered mental status


Metabolic encephalopathy


- CT head negative


- Most likely due to underlying infection


- Monitor BMP, initiate electrolyte replacement protocol


- Glucose monitoring per ICU protocol


- Diet per speech rec


- Supportive care





DVT GI prophylaxis


- Teds SCDs


- Subcutaneous heparin and Pepcid





Dispo: 


Level 2


Transfer to floor when bed available.











Chantel Gar MD Dec 10, 2017 07:33

## 2017-12-11 VITALS
DIASTOLIC BLOOD PRESSURE: 81 MMHG | TEMPERATURE: 98.3 F | RESPIRATION RATE: 26 BRPM | OXYGEN SATURATION: 93 % | HEART RATE: 88 BPM | SYSTOLIC BLOOD PRESSURE: 167 MMHG

## 2017-12-11 VITALS
SYSTOLIC BLOOD PRESSURE: 146 MMHG | DIASTOLIC BLOOD PRESSURE: 69 MMHG | TEMPERATURE: 98.8 F | HEART RATE: 104 BPM | RESPIRATION RATE: 25 BRPM | OXYGEN SATURATION: 92 %

## 2017-12-11 VITALS
HEART RATE: 74 BPM | SYSTOLIC BLOOD PRESSURE: 119 MMHG | TEMPERATURE: 99.5 F | RESPIRATION RATE: 23 BRPM | OXYGEN SATURATION: 94 % | DIASTOLIC BLOOD PRESSURE: 58 MMHG

## 2017-12-11 VITALS — HEART RATE: 94 BPM

## 2017-12-11 VITALS
OXYGEN SATURATION: 91 % | DIASTOLIC BLOOD PRESSURE: 80 MMHG | RESPIRATION RATE: 33 BRPM | HEART RATE: 103 BPM | SYSTOLIC BLOOD PRESSURE: 187 MMHG | TEMPERATURE: 98.2 F

## 2017-12-11 VITALS — HEART RATE: 81 BPM

## 2017-12-11 VITALS
OXYGEN SATURATION: 92 % | TEMPERATURE: 97.7 F | SYSTOLIC BLOOD PRESSURE: 152 MMHG | RESPIRATION RATE: 24 BRPM | HEART RATE: 101 BPM | DIASTOLIC BLOOD PRESSURE: 76 MMHG

## 2017-12-11 VITALS
SYSTOLIC BLOOD PRESSURE: 131 MMHG | TEMPERATURE: 98.5 F | OXYGEN SATURATION: 93 % | DIASTOLIC BLOOD PRESSURE: 63 MMHG | RESPIRATION RATE: 24 BRPM | HEART RATE: 76 BPM

## 2017-12-11 VITALS — OXYGEN SATURATION: 94 %

## 2017-12-11 VITALS — HEART RATE: 85 BPM

## 2017-12-11 VITALS — HEART RATE: 75 BPM

## 2017-12-11 VITALS — OXYGEN SATURATION: 96 %

## 2017-12-11 VITALS — HEART RATE: 86 BPM

## 2017-12-11 LAB
HEMOGLOBIN A1A: 1.2 %
HEMOGLOBIN A1B: 2 %
HEMOGLOBIN AO: 83.6 %
HEMOGLOBIN LA1C: 1.9 %
HEMOGLOBIN P3: 3.9 %

## 2017-12-11 RX ADMIN — PANTOPRAZOLE SCH MG: 40 TABLET, DELAYED RELEASE ORAL at 08:31

## 2017-12-11 RX ADMIN — IPRATROPIUM BROMIDE AND ALBUTEROL SULFATE SCH AMPULE: .5; 3 SOLUTION RESPIRATORY (INHALATION) at 12:00

## 2017-12-11 RX ADMIN — FAMOTIDINE SCH MG: 10 INJECTION, SOLUTION INTRAVENOUS at 21:54

## 2017-12-11 RX ADMIN — IPRATROPIUM BROMIDE AND ALBUTEROL SULFATE SCH AMPULE: .5; 3 SOLUTION RESPIRATORY (INHALATION) at 00:00

## 2017-12-11 RX ADMIN — HEPARIN SODIUM SCH UNITS: 10000 INJECTION, SOLUTION INTRAVENOUS; SUBCUTANEOUS at 00:38

## 2017-12-11 RX ADMIN — Medication SCH ML: at 08:32

## 2017-12-11 RX ADMIN — INSULIN ASPART SCH: 100 INJECTION, SOLUTION INTRAVENOUS; SUBCUTANEOUS at 16:33

## 2017-12-11 RX ADMIN — SODIUM CHLORIDE TAB 1 GM SCH GM: 1 TAB at 16:27

## 2017-12-11 RX ADMIN — SODIUM CHLORIDE SCH MLS/HR: 900 INJECTION INTRAVENOUS at 01:48

## 2017-12-11 RX ADMIN — PHENYTOIN SODIUM SCH MLS/HR: 50 INJECTION INTRAMUSCULAR; INTRAVENOUS at 18:27

## 2017-12-11 RX ADMIN — HALOPERIDOL PRN MG: 5 INJECTION INTRAMUSCULAR at 16:27

## 2017-12-11 RX ADMIN — STANDARDIZED SENNA CONCENTRATE AND DOCUSATE SODIUM SCH TAB: 8.6; 5 TABLET, FILM COATED ORAL at 08:31

## 2017-12-11 RX ADMIN — INSULIN ASPART SCH: 100 INJECTION, SOLUTION INTRAVENOUS; SUBCUTANEOUS at 21:00

## 2017-12-11 RX ADMIN — SODIUM CHLORIDE SCH MLS/HR: 900 INJECTION INTRAVENOUS at 02:00

## 2017-12-11 RX ADMIN — CHLORHEXIDINE GLUCONATE SCH PACK: 500 CLOTH TOPICAL at 04:00

## 2017-12-11 RX ADMIN — AZITHROMYCIN SCH MLS/HR: 500 INJECTION, POWDER, LYOPHILIZED, FOR SOLUTION INTRAVENOUS at 00:39

## 2017-12-11 RX ADMIN — Medication SCH ML: at 21:54

## 2017-12-11 RX ADMIN — SODIUM CHLORIDE SCH MLS/HR: 900 INJECTION INTRAVENOUS at 09:00

## 2017-12-11 RX ADMIN — HEPARIN SODIUM SCH UNITS: 10000 INJECTION, SOLUTION INTRAVENOUS; SUBCUTANEOUS at 16:27

## 2017-12-11 RX ADMIN — HEPARIN SODIUM SCH UNITS: 10000 INJECTION, SOLUTION INTRAVENOUS; SUBCUTANEOUS at 22:42

## 2017-12-11 RX ADMIN — INSULIN ASPART SCH: 100 INJECTION, SOLUTION INTRAVENOUS; SUBCUTANEOUS at 12:00

## 2017-12-11 RX ADMIN — HEPARIN SODIUM SCH UNITS: 10000 INJECTION, SOLUTION INTRAVENOUS; SUBCUTANEOUS at 08:30

## 2017-12-11 RX ADMIN — ATORVASTATIN CALCIUM SCH MG: 80 TABLET, FILM COATED ORAL at 21:00

## 2017-12-11 RX ADMIN — SODIUM CHLORIDE TAB 1 GM SCH GM: 1 TAB at 18:24

## 2017-12-11 RX ADMIN — IPRATROPIUM BROMIDE AND ALBUTEROL SULFATE SCH AMPULE: .5; 3 SOLUTION RESPIRATORY (INHALATION) at 08:00

## 2017-12-11 RX ADMIN — SODIUM CHLORIDE TAB 1 GM SCH GM: 1 TAB at 08:31

## 2017-12-11 RX ADMIN — STANDARDIZED SENNA CONCENTRATE AND DOCUSATE SODIUM SCH TAB: 8.6; 5 TABLET, FILM COATED ORAL at 21:00

## 2017-12-11 RX ADMIN — ASPIRIN SCH MG: 81 TABLET ORAL at 08:30

## 2017-12-11 RX ADMIN — METOPROLOL SUCCINATE SCH MG: 50 TABLET, FILM COATED, EXTENDED RELEASE ORAL at 08:31

## 2017-12-11 RX ADMIN — INSULIN ASPART SCH: 100 INJECTION, SOLUTION INTRAVENOUS; SUBCUTANEOUS at 08:00

## 2017-12-11 RX ADMIN — IPRATROPIUM BROMIDE AND ALBUTEROL SULFATE SCH AMPULE: .5; 3 SOLUTION RESPIRATORY (INHALATION) at 03:44

## 2017-12-11 RX ADMIN — IPRATROPIUM BROMIDE AND ALBUTEROL SULFATE SCH AMPULE: .5; 3 SOLUTION RESPIRATORY (INHALATION) at 15:05

## 2017-12-11 RX ADMIN — FAMOTIDINE SCH MG: 10 INJECTION, SOLUTION INTRAVENOUS at 08:32

## 2017-12-11 RX ADMIN — CETIRIZINE HYDROCHLORIDE SCH MG: 10 TABLET, FILM COATED ORAL at 08:31

## 2017-12-11 RX ADMIN — HALOPERIDOL PRN MG: 5 INJECTION INTRAMUSCULAR at 22:42

## 2017-12-11 RX ADMIN — IPRATROPIUM BROMIDE AND ALBUTEROL SULFATE SCH AMPULE: .5; 3 SOLUTION RESPIRATORY (INHALATION) at 19:56

## 2017-12-11 NOTE — HHI.CCPN
Subjective


Remarks/Hospital Course


72-year-old male came to the emergency room sent from the nursing home with 

altered mental status this progressively worsening over past couple days.  He 

was found today by nursing home staff to be unresponsive and in respiratory 

distress.  EMS was called.  Upon arrival they noticed that patient was 

wheezing.  Patient was given albuterol nebulizer and then was brought here.  

His oxygen saturation initially was in the high 80s on 2 L of oxygen via nasal 

cannula that's his daily requirement.  After the nebulizer oxygen saturation 

improved to 95% on 2 L.  Patient arrived with a GCS of 8.  Patient is a full 

code.  After nebulizer treatment and improve oxygenation his mental status 

thoughts improving.  Patient is able to protect his airways.


Subjective:


12/8: Patient continues to have altered mental status, formal swallow 

evaluation pending.  Lactate remains elevated, expansion of antibiotics this 

a.m.. O2 saturation remains WNL.


12/9: Afebrile.  O2 saturation 20282 percent on room air.  Patient tolerating a 

diet.  Patient remains confused, hard restraints for safety.





12/10: No acute events reported overnight.  Patient remains encephalopathic, 

confused.  Awaiting transfer to floor.





12/11: Received 1 dose of Haldol around 5 AM this morning for agitation.  

Resting in bed comfortably.  Not in any acute distress.  Remains confused and 

disoriented.  Awaiting transfer to floor.





Objective





Vital Signs








  Date Time  Temp Pulse Resp B/P (MAP) Pulse Ox O2 Delivery O2 Flow Rate FiO2


 


12/11/17 06:00  94      


 


12/11/17 04:00 98.8  25 146/69 (94) 92   


 


12/10/17 19:36      Nasal Cannula  21


 


12/8/17 20:34       2.00 














Intake and Output   


 


 12/11/17 12/11/17 12/11/17





 07:59 15:59 23:59


 


Intake Total 600 ml  


 


Output Total 1600 ml  


 


Balance -1000 ml  








Result Diagram:  


12/10/17 0520                                                                  

              12/11/17 0341





Other Results





Microbiology








 Date/Time


Source Procedure


Growth Status


 


 


 12/9/17 18:10


Nasal Washing Influenza Types A,B Antigen (JAQUELIN) - Final


NEGATIVE FOR FLU A AND B ANTIGEN.... Complete








Imaging





Last 24 hours Impressions








Head CT 12/7/17 2002 Signed





Impressions: 





 Service Date/Time:  Thursday, December 7, 2017 21:05 - CONCLUSION:  1. 

Moderate 





 periventricular and subcortical white matter small vessel ischemic changes 





 bilaterally.  2. Diffuse cerebral atrophy is stable.  3. No acute infarct, 

acute 





 hemorrhage, mass effect or extra-axial fluid collections.  4. Tiny air-fluid 





 level within the right maxillary sinus and diffuse mucosal thickening 

involving 





 all of the paranasal sinuses     Adam Zambrano MD 


 


Chest X-Ray 12/7/17 2002 Signed





Impressions: 





 Service Date/Time:  Thursday, December 7, 2017 20:27 - CONCLUSION:  1. No 

acute 





 cardiopulmonary disease.     Walter Russell MD 








Objective Remarks


GENERAL:This is a  cachectic  chronically ill appearing man 

encephalopathic


SKIN: Warm/dry.


HEAD: Atraumatic. Normocephalic. 


EYES: Pupils equal and round. No scleral icterus. No injection or drainage. 


ENT: No nasal bleeding or discharge. 


NECK: Trachea midline. No JVD. 


CARDIOVASCULAR: Regular rate and rhythm.  No murmur appreciated.


RESPIRATORY: No accessory muscles, clear to auscultation bilaterally


GASTROINTESTINAL: Abdomen soft, non-tender, nondistended. Hepatic and splenic 

margins not palpable. 


MUSCULOSKELETAL: No obvious deformities. No clubbing.  No cyanosis.  No edema. 


NEUROLOGICAL: Patient is confused, unable to follow commands but awakened moves 

all extremities spontaneously.





A/P


Assessment and Plan


Respiratory failure-resolved


- Underlying COPD and asthma


- DuoNeb scheduled and when necessary


- Broad-spectrum antibiotic (currently on Azithromycin)


- Cultures and de-escalate per sensitivity


- Urine Legionella and pneumococcal antigen negative


-12/8:Chest x-ray no acute process





Sepsis


- Monitor lactic acid 2.5->3.1. Now 1.4


- Aggressive IV fluids resuscitation, /hr-reduce to 40 ml per hour


- Broad-spectrum antibiotics and follow-up culture


- ID following on. H/o bacteremia 2/2 ESBL organism in 5/17y. Currently on 

vancomycin, meropenem, azithromycin per ID


- All culture negative to date. Consolidate ABX -defer to ID





History of hypertension


- Hypotension resolved on antihypertensives resumed





Anxiety


- Lorazepam when necessary for agitation





Tobacco use disorder


- Cessation when neurologically improved





Altered mental status


Metabolic encephalopathy


- CT head negative


- Most likely due to underlying infection


- Monitor BMP, initiate electrolyte replacement protocol


- Glucose monitoring per ICU protocol


- Diet per speech rec


- Supportive care





DVT GI prophylaxis


- Teds SCDs


- Subcutaneous heparin and Pepcid





Dispo: 


Level 2


Transfer to floor when bed available.











Pedrito Rodriguez MD Dec 11, 2017 09:39

## 2017-12-11 NOTE — HHI.IDPN
Subjective


Subjective


Remarks


pt is very confused and agitated


afebrile


Antibiotics


azithro


meropenem


vanco


Allergies:  


Coded Allergies:  


     diatrizoate meglumine (Unverified  Allergy, Mild, HIVES, ITCHING, 8/15/17)


     gadobenic acid (Unverified  Allergy, Mild, HIVES, ITCHING, 8/15/17)


     gadodiamide (Unverified  Allergy, Mild, HIVES, ITCHING, 8/15/17)


     gadoteridol (Unverified  Allergy, Mild, HIVES, ITCHING, 8/15/17)


     iodixanol (Unverified  Allergy, Mild, HIVES, ITCHING, 8/15/17)


     iohexol (Unverified  Allergy, Mild, HIVES, ITCHING, 8/15/17)





Objective


.





Vital Signs








  Date Time  Temp Pulse Resp B/P (MAP) Pulse Ox O2 Delivery O2 Flow Rate FiO2


 


12/11/17 08:00 97.7 94 24 152/76 (101) 92   


 


12/11/17 08:00  101      


 


12/11/17 06:00  94      


 


12/11/17 04:00  104      


 


12/11/17 04:00 98.8 104 25 146/69 (94) 92   


 


12/11/17 02:00  86      


 


12/11/17 00:00  103      


 


12/11/17 00:00 98.2 103 33 187/80 (115) 91   


 


12/10/17 22:00  96      


 


12/10/17 20:00 98.7 112 29 137/88 (104) 93   


 


12/10/17 20:00  112      


 


12/10/17 19:36     93 Nasal Cannula  21


 


12/10/17 18:00  100      


 


12/10/17 16:00  108      


 


12/10/17 16:00 98.5 108 29 145/75 (98) 94   


 


12/10/17 15:00  99 27  91   


 


12/10/17 14:00  89 25 137/75 (95)    


 


12/10/17 14:00  89      


 


12/10/17 13:00  102 30 131/78 (95)    








.





Laboratory Tests








Test


  12/10/17


05:20


 


White Blood Count 8.6 TH/MM3 


 


Red Blood Count 4.19 MIL/MM3 


 


Hemoglobin 11.6 GM/DL 


 


Hematocrit 34.0 % 


 


Mean Corpuscular Volume 81.0 FL 


 


Mean Corpuscular Hemoglobin 27.5 PG 


 


Mean Corpuscular Hemoglobin


Concent 34.0 % 


 


 


Red Cell Distribution Width 16.9 % 


 


Platelet Count 178 TH/MM3 


 


Mean Platelet Volume 8.8 FL 


 


Neutrophils (%) (Auto) 30.8 % 


 


Lymphocytes (%) (Auto) 33.5 % 


 


Monocytes (%) (Auto) 9.0 % 


 


Eosinophils (%) (Auto) 23.8 % 


 


Basophils (%) (Auto) 2.9 % 


 


Neutrophils # (Auto) 2.7 TH/MM3 


 


Lymphocytes # (Auto) 2.9 TH/MM3 


 


Monocytes # (Auto) 0.8 TH/MM3 


 


Eosinophils # (Auto) 2.1 TH/MM3 


 


Basophils # (Auto) 0.3 TH/MM3 


 


CBC Comment DIFF FINAL 


 


Differential Comment  








Laboratory Tests








Test


  12/10/17


05:20 12/10/17


19:33 12/11/17


03:41


 


Blood Urea Nitrogen 4 MG/DL   


 


Creatinine 0.63 MG/DL   


 


Random Glucose 75 MG/DL   


 


Total Protein 7.2 GM/DL   


 


Albumin 2.8 GM/DL   


 


Calcium Level 8.1 MG/DL   


 


Phosphorus Level 1.3 MG/DL  2.0 MG/DL  


 


Magnesium Level 1.5 MG/DL   


 


Alkaline Phosphatase 124 U/L   


 


Aspartate Amino Transf


(AST/SGOT) 25 U/L 


  


  


 


 


Alanine Aminotransferase


(ALT/SGPT) 20 U/L 


  


  


 


 


Total Bilirubin 0.5 MG/DL   


 


Sodium Level 140 MEQ/L   


 


Potassium Level 3.4 MEQ/L  3.6 MEQ/L  3.8 MEQ/L 


 


Chloride Level 107 MEQ/L   


 


Carbon Dioxide Level 25.6 MEQ/L   


 


Anion Gap 7 MEQ/L   


 


Estimat Glomerular Filtration


Rate 151 ML/MIN 


  


  


 


 


Free Thyroxine 1.81 NG/DL   


 


Thyroid Stimulating Hormone


3rd Gen 3.480 uIU/ML 


  


  


 








Microbiology








 Date/Time


Source Procedure


Growth Status


 


 


 12/9/17 18:10


Nasal Washing Influenza Types A,B Antigen (JAQUELIN) - Final


NEGATIVE FOR FLU A AND B ANTIGEN.... Complete








Imaging


 


Last Impressions








Abdomen/Pelvis CT 12/8/17 0000 Signed





Impressions: 





 Service Date/Time:  Friday, December 8, 2017 22:05 - CONCLUSION:  1. Hepatic 





 cysts. 2. Atherosclerosis.     Maurice An MD 


 


Head CT 12/7/17 2002 Signed





Impressions: 





 Service Date/Time:  Thursday, December 7, 2017 21:05 - CONCLUSION:  1. 

Moderate 





 periventricular and subcortical white matter small vessel ischemic changes 





 bilaterally.  2. Diffuse cerebral atrophy is stable.  3. No acute infarct, 

acute 





 hemorrhage, mass effect or extra-axial fluid collections.  4. Tiny air-fluid 





 level within the right maxillary sinus and diffuse mucosal thickening 

involving 





 all of the paranasal sinuses     Adam Zambrano MD 


 


Chest X-Ray 12/7/17 2002 Signed





Impressions: 





 Service Date/Time:  Thursday, December 7, 2017 20:27 - CONCLUSION:  1. No 

acute 





 cardiopulmonary disease.     Walter Russell MD 








Physical Exam


CONSTITUTIONAL/GENERAL: This is a thin elderly patient, in no apparent distress.


TUBES/LINES/DRAINS:


SKIN: No jaundice, rashes, or lesions.  Skin temperature appropriate. Not 

diaphoretic. 


 EYES: Pupils equal and round and reactive. No scleral icterus. No injection or 

drainage. Fundi not examined.


ENT: Hearing grossly normal. Nose without bleeding or purulent drainage. Throat 

without visible erythema, exudates, masses, or lesions.


 CARDIOVASCULAR: Regular rate and rhythm without murmurs, gallops, or rubs. No 

JVD. Peripheral pulses symmetric.


RESPIRATORY/CHEST: Symmetric, unlabored respirations. Clear to auscultation. 

Breath sounds equal bilaterally. No wheezes, rales, or rhonchi.  


GASTROINTESTINAL: Abdomen soft, non-tender, nondistended. No hepato-splenomegaly

, or palpable masses. No guarding. Bowel sounds present.


GENITOURINARY: Without palpable bladder distension. 


MUSCULOSKELETAL: Extremities without clubbing, cyanosis, or edema. No joint 

tenderness or effusion noted. No calf tenderness. No mottling or clubbing.


LYMPHATICS: No palpable cervical or supraclavicular adenopathy.


NEUROLOGICAL: Awake and alert. Agitated and confused. Oriented x 1 . Speech 

unintelligible. Moves all extremities.


PSYCHIATRIC: remains agitated








Assessment & Plan


Remarks


Lactic acidosis


   - resolved 


   - BC remain negative, UA neg


   CT A/P unremarkable 


Pt is critical, BP is unstable


Confusion ? is sepsis contributing 


H/o bacteremia 2/2 ESBL + organism in May








   dc vancomycin, 


   dc meropenem


   fu blood clx untill final


dw Lorrie Devries MD Dec 11, 2017 13:01

## 2017-12-12 VITALS
HEART RATE: 68 BPM | OXYGEN SATURATION: 95 % | TEMPERATURE: 99.5 F | RESPIRATION RATE: 19 BRPM | DIASTOLIC BLOOD PRESSURE: 63 MMHG | SYSTOLIC BLOOD PRESSURE: 141 MMHG

## 2017-12-12 VITALS
HEART RATE: 78 BPM | RESPIRATION RATE: 25 BRPM | OXYGEN SATURATION: 96 % | TEMPERATURE: 97.5 F | DIASTOLIC BLOOD PRESSURE: 74 MMHG | SYSTOLIC BLOOD PRESSURE: 128 MMHG

## 2017-12-12 VITALS
SYSTOLIC BLOOD PRESSURE: 149 MMHG | DIASTOLIC BLOOD PRESSURE: 81 MMHG | HEART RATE: 81 BPM | TEMPERATURE: 98.8 F | RESPIRATION RATE: 25 BRPM | OXYGEN SATURATION: 95 %

## 2017-12-12 VITALS
RESPIRATION RATE: 20 BRPM | OXYGEN SATURATION: 92 % | HEART RATE: 69 BPM | SYSTOLIC BLOOD PRESSURE: 129 MMHG | TEMPERATURE: 98.1 F | DIASTOLIC BLOOD PRESSURE: 64 MMHG

## 2017-12-12 VITALS
OXYGEN SATURATION: 100 % | TEMPERATURE: 98.6 F | DIASTOLIC BLOOD PRESSURE: 74 MMHG | SYSTOLIC BLOOD PRESSURE: 133 MMHG | RESPIRATION RATE: 20 BRPM | HEART RATE: 75 BPM

## 2017-12-12 VITALS
SYSTOLIC BLOOD PRESSURE: 117 MMHG | DIASTOLIC BLOOD PRESSURE: 71 MMHG | RESPIRATION RATE: 20 BRPM | HEART RATE: 90 BPM | TEMPERATURE: 98.3 F | OXYGEN SATURATION: 94 %

## 2017-12-12 VITALS — HEART RATE: 90 BPM

## 2017-12-12 VITALS — HEART RATE: 71 BPM

## 2017-12-12 VITALS — HEART RATE: 76 BPM

## 2017-12-12 RX ADMIN — HEPARIN SODIUM SCH UNITS: 10000 INJECTION, SOLUTION INTRAVENOUS; SUBCUTANEOUS at 16:51

## 2017-12-12 RX ADMIN — INSULIN ASPART SCH: 100 INJECTION, SOLUTION INTRAVENOUS; SUBCUTANEOUS at 16:50

## 2017-12-12 RX ADMIN — ATORVASTATIN CALCIUM SCH MG: 80 TABLET, FILM COATED ORAL at 22:09

## 2017-12-12 RX ADMIN — HEPARIN SODIUM SCH UNITS: 10000 INJECTION, SOLUTION INTRAVENOUS; SUBCUTANEOUS at 09:54

## 2017-12-12 RX ADMIN — ASPIRIN SCH MG: 81 TABLET ORAL at 09:54

## 2017-12-12 RX ADMIN — STANDARDIZED SENNA CONCENTRATE AND DOCUSATE SODIUM SCH TAB: 8.6; 5 TABLET, FILM COATED ORAL at 22:08

## 2017-12-12 RX ADMIN — SODIUM CHLORIDE TAB 1 GM SCH GM: 1 TAB at 12:32

## 2017-12-12 RX ADMIN — STANDARDIZED SENNA CONCENTRATE AND DOCUSATE SODIUM SCH TAB: 8.6; 5 TABLET, FILM COATED ORAL at 09:54

## 2017-12-12 RX ADMIN — PANTOPRAZOLE SCH MG: 40 TABLET, DELAYED RELEASE ORAL at 09:53

## 2017-12-12 RX ADMIN — SODIUM CHLORIDE TAB 1 GM SCH GM: 1 TAB at 17:55

## 2017-12-12 RX ADMIN — INSULIN ASPART SCH: 100 INJECTION, SOLUTION INTRAVENOUS; SUBCUTANEOUS at 10:59

## 2017-12-12 RX ADMIN — HALOPERIDOL PRN MG: 5 INJECTION INTRAMUSCULAR at 05:48

## 2017-12-12 RX ADMIN — INSULIN ASPART SCH: 100 INJECTION, SOLUTION INTRAVENOUS; SUBCUTANEOUS at 08:00

## 2017-12-12 RX ADMIN — CETIRIZINE HYDROCHLORIDE SCH MG: 10 TABLET, FILM COATED ORAL at 09:54

## 2017-12-12 RX ADMIN — CHLORHEXIDINE GLUCONATE SCH PACK: 500 CLOTH TOPICAL at 04:00

## 2017-12-12 RX ADMIN — Medication SCH ML: at 09:54

## 2017-12-12 RX ADMIN — INSULIN ASPART SCH: 100 INJECTION, SOLUTION INTRAVENOUS; SUBCUTANEOUS at 21:00

## 2017-12-12 RX ADMIN — Medication SCH ML: at 22:09

## 2017-12-12 RX ADMIN — SODIUM CHLORIDE TAB 1 GM SCH GM: 1 TAB at 09:00

## 2017-12-12 RX ADMIN — METOPROLOL SUCCINATE SCH MG: 50 TABLET, FILM COATED, EXTENDED RELEASE ORAL at 09:53

## 2017-12-12 NOTE — HHI.CCPN
Subjective


Remarks/Hospital Course


72-year-old male came to the emergency room sent from the nursing home with 

altered mental status this progressively worsening over past couple days.  He 

was found today by nursing home staff to be unresponsive and in respiratory 

distress.  EMS was called.  Upon arrival they noticed that patient was 

wheezing.  Patient was given albuterol nebulizer and then was brought here.  

His oxygen saturation initially was in the high 80s on 2 L of oxygen via nasal 

cannula that's his daily requirement.  After the nebulizer oxygen saturation 

improved to 95% on 2 L.  Patient arrived with a GCS of 8.  Patient is a full 

code.  After nebulizer treatment and improve oxygenation his mental status 

thoughts improving.  Patient is able to protect his airways.


Subjective:


12/8: Patient continues to have altered mental status, formal swallow 

evaluation pending.  Lactate remains elevated, expansion of antibiotics this 

a.m.. O2 saturation remains WNL.


12/9: Afebrile.  O2 saturation 39580 percent on room air.  Patient tolerating a 

diet.  Patient remains confused, hard restraints for safety.





12/10: No acute events reported overnight.  Patient remains encephalopathic, 

confused.  Awaiting transfer to floor.





12/11: Received 1 dose of Haldol around 5 AM this morning for agitation.  

Resting in bed comfortably.  Not in any acute distress.  Remains confused and 

disoriented.  Awaiting transfer to floor.





12/12: off abx. clinically stable. on home o2. agitated, but per family, this 

is baseline. no reason to keep inpatient. still in restraints for safety not to 

pull at iv's. per SNF, would prefer him to be on PO agents to control agitation.





Objective





Vital Signs








  Date Time  Temp Pulse Resp B/P (MAP) Pulse Ox O2 Delivery O2 Flow Rate FiO2


 


12/12/17 06:00  76      


 


12/12/17 04:00 97.5  25 128/74 (92) 96   


 


12/11/17 19:58        21


 


12/10/17 19:36      Nasal Cannula  


 


12/8/17 20:34       2.00 














Intake and Output   


 


 12/12/17 12/12/17 12/13/17





 08:00 16:00 00:00


 


Intake Total 450 ml  


 


Output Total 650 ml  


 


Balance -200 ml  








Result Diagram:  


12/10/17 0520                                                                  

              12/11/17 0341





Other Results





Microbiology








 Date/Time


Source Procedure


Growth Status


 


 


 12/9/17 18:10


Nasal Washing Influenza Types A,B Antigen (JAQUELIN) - Final


NEGATIVE FOR FLU A AND B ANTIGEN.... Complete








Imaging





Last 24 hours Impressions








Head CT 12/7/17 2002 Signed





Impressions: 





 Service Date/Time:  Thursday, December 7, 2017 21:05 - CONCLUSION:  1. 

Moderate 





 periventricular and subcortical white matter small vessel ischemic changes 





 bilaterally.  2. Diffuse cerebral atrophy is stable.  3. No acute infarct, 

acute 





 hemorrhage, mass effect or extra-axial fluid collections.  4. Tiny air-fluid 





 level within the right maxillary sinus and diffuse mucosal thickening 

involving 





 all of the paranasal sinuses     Adam Zambrano MD 


 


Chest X-Ray 12/7/17 2002 Signed





Impressions: 





 Service Date/Time:  Thursday, December 7, 2017 20:27 - CONCLUSION:  1. No 

acute 





 cardiopulmonary disease.     Walter Russell MD 








Objective Remarks


GENERAL:This is a  cachectic  chronically ill appearing man 


SKIN: Warm/dry.


HEAD: Atraumatic. Normocephalic. 


EYES: Pupils equal and round. No scleral icterus. No injection or drainage. 


ENT: No nasal bleeding or discharge. 


NECK: Trachea midline. No JVD. 


CARDIOVASCULAR: Regular rate and rhythm.


RESPIRATORY: No accessory muscles, equal chest rise. nc o2.


GASTROINTESTINAL: Abdomen soft, non-tender, nondistended. 


MUSCULOSKELETAL: No obvious deformities. No clubbing.  No cyanosis.  No edema. 


NEUROLOGICAL: Patient is confused, unable to follow commands but awake and 

alert. baseline neuro exam per family.





A/P


Assessment and Plan


Assessment: 72yM with agitated delirium and resolved infection/sepsis. need to 

control agitation in order to be ready to go to SNF. hopefully will be ready to 

go by tomorrow.





Agitated Delirium


- add po seroquel 50mg q8h


- change to po haldol 5mg q4h for breakthrough agitation


- will attempt to hold restraints.





Respiratory failure-resolved


- Underlying COPD and asthma


- DuoNeb scheduled and when necessary


- s/p full course of abx


- back on home o2.





Sepsis- resolved.


- lactate cleared.


- d/c mivf.   


- s/p full course of abx.


- ID following.





Hypertension


- Hypotension resolved on home antihypertensives





Tobacco use disorder


- Cessation when neurologically improved





DVT GI prophylaxis


- Teds SCDs


- Subcutaneous heparin and Pepcid





d/c bella.





Dispo: 


Level 2


Transfer to floor when bed available.


Could likely go to SNF tomorrow if out of restraints x 24h. mental status at 

baseline (agitated at SNF). discussed with SNF and they are ok with this once 

off restraints.











Joel Yuen MD Dec 12, 2017 09:53

## 2017-12-13 VITALS
TEMPERATURE: 98.4 F | HEART RATE: 78 BPM | OXYGEN SATURATION: 93 % | RESPIRATION RATE: 20 BRPM | DIASTOLIC BLOOD PRESSURE: 57 MMHG | SYSTOLIC BLOOD PRESSURE: 105 MMHG

## 2017-12-13 VITALS
HEART RATE: 79 BPM | OXYGEN SATURATION: 95 % | TEMPERATURE: 99.7 F | DIASTOLIC BLOOD PRESSURE: 60 MMHG | RESPIRATION RATE: 20 BRPM | SYSTOLIC BLOOD PRESSURE: 103 MMHG

## 2017-12-13 VITALS
RESPIRATION RATE: 20 BRPM | DIASTOLIC BLOOD PRESSURE: 74 MMHG | TEMPERATURE: 98 F | SYSTOLIC BLOOD PRESSURE: 144 MMHG | OXYGEN SATURATION: 96 % | HEART RATE: 90 BPM

## 2017-12-13 VITALS
TEMPERATURE: 99 F | SYSTOLIC BLOOD PRESSURE: 124 MMHG | RESPIRATION RATE: 20 BRPM | OXYGEN SATURATION: 94 % | HEART RATE: 97 BPM | DIASTOLIC BLOOD PRESSURE: 66 MMHG

## 2017-12-13 RX ADMIN — HEPARIN SODIUM SCH UNITS: 10000 INJECTION, SOLUTION INTRAVENOUS; SUBCUTANEOUS at 10:04

## 2017-12-13 RX ADMIN — SODIUM CHLORIDE TAB 1 GM SCH GM: 1 TAB at 10:03

## 2017-12-13 RX ADMIN — Medication SCH ML: at 10:05

## 2017-12-13 RX ADMIN — ASPIRIN SCH MG: 81 TABLET ORAL at 10:03

## 2017-12-13 RX ADMIN — SODIUM CHLORIDE TAB 1 GM SCH GM: 1 TAB at 13:00

## 2017-12-13 RX ADMIN — METOPROLOL SUCCINATE SCH MG: 50 TABLET, FILM COATED, EXTENDED RELEASE ORAL at 10:03

## 2017-12-13 RX ADMIN — CETIRIZINE HYDROCHLORIDE SCH MG: 10 TABLET, FILM COATED ORAL at 10:03

## 2017-12-13 RX ADMIN — PANTOPRAZOLE SCH MG: 40 TABLET, DELAYED RELEASE ORAL at 10:03

## 2017-12-13 RX ADMIN — CHLORHEXIDINE GLUCONATE SCH PACK: 500 CLOTH TOPICAL at 04:00

## 2017-12-13 RX ADMIN — STANDARDIZED SENNA CONCENTRATE AND DOCUSATE SODIUM SCH TAB: 8.6; 5 TABLET, FILM COATED ORAL at 10:03

## 2017-12-13 RX ADMIN — INSULIN ASPART SCH: 100 INJECTION, SOLUTION INTRAVENOUS; SUBCUTANEOUS at 12:00

## 2017-12-13 RX ADMIN — HEPARIN SODIUM SCH UNITS: 10000 INJECTION, SOLUTION INTRAVENOUS; SUBCUTANEOUS at 00:38

## 2017-12-13 RX ADMIN — INSULIN ASPART SCH: 100 INJECTION, SOLUTION INTRAVENOUS; SUBCUTANEOUS at 08:00

## 2017-12-13 NOTE — HHI.DS
__________________________________________________





Discharge Summary


Admission Date


Dec 7, 2017 at 22:16


Discharge Date:  Dec 13, 2017


Admitting Diagnosis





altered mental status, respiratory distress, sepsis





(1) Altered mental status, unspecified


ICD Code:  R41.82 - Altered mental status, unspecified


Status:  Acute


(2) Sepsis


ICD Code:  A41.9 - Sepsis, unspecified organism


Status:  Acute


(3) Acute exacerbation of chronic obstructive pulmonary disease (COPD)


ICD Code:  J44.1 - Chronic obstructive pulmonary disease with (acute) 

exacerbation


Status:  Acute


(4) Dementia


ICD Code:  F03.90 - Unspecified dementia without behavioral disturbance


Status:  Acute


Procedures


None


Brief History - From Admission


History of present illness from the admitting team





72-year-old male came to the emergency room sent from the nursing home with 

altered mental status this progressively worsening over past couple days.  He 

was found today by nursing home staff to be unresponsive and in respiratory 

distress.  EMS was called.  Upon arrival they noticed that patient was 

wheezing.  Patient was given albuterol nebulizer and then was brought here.  

His oxygen saturation initially was in the high 80s on 2 L of oxygen via nasal 

cannula that's his daily requirement.  After the nebulizer oxygen saturation 

improved to 95% on 2 L.  Patient arrived with a GCS of 8.  Patient is a full 

code.  After nebulizer treatment and improve oxygenation his mental status 

thoughts improving.  Patient is able to protect his airways.


CBC/BMP:  


12/10/17 0520                                                                  

              12/11/17 0341





Significant Findings





Laboratory Tests








Test


  12/10/17


19:33 12/11/17


03:41


 


Phosphorus Level


  2.0 MG/DL


(2.5-4.9) 


 








Imaging





Last Impressions








Abdomen/Pelvis CT 12/8/17 0000 Signed





Impressions: 





 Service Date/Time:  Friday, December 8, 2017 22:05 - CONCLUSION:  1. Hepatic 





 cysts. 2. Atherosclerosis.     Maurice An MD 


 


Head CT 12/7/17 2002 Signed





Impressions: 





 Service Date/Time:  Thursday, December 7, 2017 21:05 - CONCLUSION:  1. 

Moderate 





 periventricular and subcortical white matter small vessel ischemic changes 





 bilaterally.  2. Diffuse cerebral atrophy is stable.  3. No acute infarct, 

acute 





 hemorrhage, mass effect or extra-axial fluid collections.  4. Tiny air-fluid 





 level within the right maxillary sinus and diffuse mucosal thickening 

involving 





 all of the paranasal sinuses     Adam Zambrano MD 


 


Chest X-Ray 12/7/17 2002 Signed





Impressions: 





 Service Date/Time:  Thursday, December 7, 2017 20:27 - CONCLUSION:  1. No 

acute 





 cardiopulmonary disease.     Walter Russell MD 








PE at Discharge


GENERAL: Confused, in no apparent distress.


CARDIOVASCULAR: Normal rate and regular rhythm without murmurs, gallops, or 

rubs. 


RESPIRATORY: Good respiratory efforts. Breath sounds equal and clear to 

auscultation bilaterally.


GASTROINTESTINAL: Abdomen soft, non-tender, non-distended. Normal active bowel 

sounds


MUSCULOSKELETAL: Extremities without cyanosis, or edema.


NEURO: Confuse, minimal conversation.


PSYCH: Calm


Pt update on day of discharge


Patient reports he is doing okay.  No complaints.  Did not require restraints 

today.


Hospital Course


72-year-old male admitted with agitated delirium and respiratory failure.  

Patient was admitted to the ICU under the intensivist service.  He was treated 

for sepsis and infection.  He completed the course of antibiotics.  He was 

treated for underlying COPD.  He had issues with agitation and was started on 

Seroquel.  Symptoms significantly improved and the patient was back to his 

baseline.  He is discharged back to the skilled nursing facility.


Pt Condition on Discharge:  Good


Discharge Disposition:  Discharge to SNF


Discharge Time:  > 30 minutes


Discharge Instructions


DIET: Follow Instructions for:  As Tolerated, No Restrictions


Activities you can perform:  Regular-No Restrictions


New Medications:  


Famotidine (Famotidine) 40 Mg Tab


40 MG PO BID, #60 TAB 0 Refills





Quetiapine (Seroquel) 25 Mg Tab


25 MG PO BID, #60 TAB





 


Continued Medications:  


Amlodipine (Amlodipine) 2.5 Mg Tab


2.5 MG PO DAILY for Blood Pressure Management, #30 TAB 0 Refills





Aspirin DR (Aspirin EC) 81 Mg Tabdr


81 MG PO DAILY for Blood Clot Prevention, #90 TAB 0 Refills





Atorvastatin (Atorvastatin) 80 Mg Tab


80 MG PO HS for Cholesterol Management, #30 TAB 0 Refills





Cetirizine (Cetirizine) 10 Mg Tab


10 MG PO DAILY for Allergies, TAB 0 Refills





Lorazepam (Lorazepam) 0.5 Mg Tab


0.5 MG PO DAILY PRN for ANXIETY, TAB 0 Refills





Metoprolol Succinate ER 24 HR (Metoprolol Succinate ER 24 HR) 50 Mg Tab


50 MG PO DAILY, #30 TAB 0 Refills





Potassium Chloride Microencaps (Potassium Chloride Microencaps) 20 Meq Tab


20 MEQ PO Q12HR for elec for 10 Days, TAB





Sodium Chloride (Sodium Chloride) 1 Gm Tab


1 GM PO TID for hyponatremia, #30 TAB 1 Refill





 


Discontinued Medications:  


Omeprazole (Omeprazole) 40 Mg Cap


40 MG PO DAILY, #30 CAP 0 Refills

















Danae Grubbs MD Dec 13, 2017 12:08

## 2018-04-05 NOTE — HHI.DCPOC
Discharge Care Plan


Diagnosis:  


(1) Sepsis


(2) Altered mental status, unspecified


Your Health Problems Are:     Difficulty with ADL


         Exercise Tolerance


Goals to Promote Your Health


* To prevent worsening of your condition and complications


* To maintain your health at the optimal level


Directions to Meet Your Goals


*** Take your medications as prescribed


*** Follow your dietary instruction


*** Follow activity as directed








*** Keep your appointments as scheduled


*** Take your immunizations and boosters as scheduled


*** If your symptoms worsen call your PCP, if no PCP go to Urgent Care Center 

or Emergency Room***


*** Smoking is Dangerous to Your Health. Avoid second hand smoke***


***Call the 24-hour hour crisis hotline for domestic abuse at [phone]***








Shaq Lama MD May 17, 2017 11:52
Patient/Caregiver provided printed discharge information.

## 2018-05-12 ENCOUNTER — HOSPITAL ENCOUNTER (OUTPATIENT)
Dept: HOSPITAL 17 - NEPC | Age: 73
Setting detail: OBSERVATION
Discharge: HOME | End: 2018-05-12
Attending: INTERNAL MEDICINE | Admitting: INTERNAL MEDICINE
Payer: MEDICARE

## 2018-05-12 VITALS
OXYGEN SATURATION: 98 % | HEART RATE: 95 BPM | RESPIRATION RATE: 18 BRPM | TEMPERATURE: 97.6 F | DIASTOLIC BLOOD PRESSURE: 75 MMHG | SYSTOLIC BLOOD PRESSURE: 104 MMHG

## 2018-05-12 VITALS — DIASTOLIC BLOOD PRESSURE: 79 MMHG | SYSTOLIC BLOOD PRESSURE: 166 MMHG | TEMPERATURE: 98.3 F

## 2018-05-12 VITALS
DIASTOLIC BLOOD PRESSURE: 60 MMHG | HEART RATE: 96 BPM | SYSTOLIC BLOOD PRESSURE: 128 MMHG | TEMPERATURE: 97.8 F | RESPIRATION RATE: 18 BRPM | OXYGEN SATURATION: 93 %

## 2018-05-12 VITALS
DIASTOLIC BLOOD PRESSURE: 60 MMHG | SYSTOLIC BLOOD PRESSURE: 135 MMHG | RESPIRATION RATE: 18 BRPM | HEART RATE: 84 BPM | OXYGEN SATURATION: 97 % | TEMPERATURE: 98.2 F

## 2018-05-12 VITALS — HEIGHT: 67 IN | WEIGHT: 130.27 LBS | BODY MASS INDEX: 20.45 KG/M2

## 2018-05-12 VITALS — HEART RATE: 97 BPM

## 2018-05-12 VITALS
TEMPERATURE: 98.4 F | SYSTOLIC BLOOD PRESSURE: 135 MMHG | DIASTOLIC BLOOD PRESSURE: 74 MMHG | RESPIRATION RATE: 18 BRPM | HEART RATE: 109 BPM | OXYGEN SATURATION: 96 %

## 2018-05-12 VITALS — OXYGEN SATURATION: 96 %

## 2018-05-12 DIAGNOSIS — R07.9: Primary | ICD-10-CM

## 2018-05-12 DIAGNOSIS — E78.5: ICD-10-CM

## 2018-05-12 DIAGNOSIS — E11.9: ICD-10-CM

## 2018-05-12 DIAGNOSIS — I25.2: ICD-10-CM

## 2018-05-12 DIAGNOSIS — H54.7: ICD-10-CM

## 2018-05-12 DIAGNOSIS — H91.90: ICD-10-CM

## 2018-05-12 DIAGNOSIS — I25.10: ICD-10-CM

## 2018-05-12 DIAGNOSIS — F17.200: ICD-10-CM

## 2018-05-12 DIAGNOSIS — Z95.5: ICD-10-CM

## 2018-05-12 DIAGNOSIS — K21.9: ICD-10-CM

## 2018-05-12 DIAGNOSIS — F03.90: ICD-10-CM

## 2018-05-12 DIAGNOSIS — I10: ICD-10-CM

## 2018-05-12 DIAGNOSIS — J44.1: ICD-10-CM

## 2018-05-12 LAB
BASOPHILS # BLD AUTO: 0 TH/MM3 (ref 0–0.2)
BASOPHILS NFR BLD: 0.2 % (ref 0–2)
BUN SERPL-MCNC: 8 MG/DL (ref 7–18)
CALCIUM SERPL-MCNC: 8.4 MG/DL (ref 8.5–10.1)
CHLORIDE SERPL-SCNC: 104 MEQ/L (ref 98–107)
CREAT SERPL-MCNC: 1 MG/DL (ref 0.6–1.3)
EOSINOPHIL # BLD: 0.1 TH/MM3 (ref 0–0.4)
EOSINOPHIL NFR BLD: 1.3 % (ref 0–4)
ERYTHROCYTE [DISTWIDTH] IN BLOOD BY AUTOMATED COUNT: 15.5 % (ref 11.6–17.2)
GFR SERPLBLD BASED ON 1.73 SQ M-ARVRAT: 89 ML/MIN (ref 89–?)
GLUCOSE SERPL-MCNC: 137 MG/DL (ref 74–106)
HCO3 BLD-SCNC: 28.9 MEQ/L (ref 21–32)
HCT VFR BLD CALC: 37 % (ref 39–51)
HGB BLD-MCNC: 12.4 GM/DL (ref 13–17)
INR PPP: 1 RATIO
LYMPHOCYTES # BLD AUTO: 0.9 TH/MM3 (ref 1–4.8)
LYMPHOCYTES NFR BLD AUTO: 11.3 % (ref 9–44)
MCH RBC QN AUTO: 27.9 PG (ref 27–34)
MCHC RBC AUTO-ENTMCNC: 33.5 % (ref 32–36)
MCV RBC AUTO: 83.3 FL (ref 80–100)
MONOCYTE #: 0.4 TH/MM3 (ref 0–0.9)
MONOCYTES NFR BLD: 5.7 % (ref 0–8)
NEUTROPHILS # BLD AUTO: 6.4 TH/MM3 (ref 1.8–7.7)
NEUTROPHILS NFR BLD AUTO: 81.5 % (ref 16–70)
PLATELET # BLD: 186 TH/MM3 (ref 150–450)
PMV BLD AUTO: 7.7 FL (ref 7–11)
PROTHROMBIN TIME: 10.5 SEC (ref 9.8–11.6)
RBC # BLD AUTO: 4.44 MIL/MM3 (ref 4.5–5.9)
SODIUM SERPL-SCNC: 140 MEQ/L (ref 136–145)
TROPONIN I SERPL-MCNC: (no result) NG/ML (ref 0.02–0.05)
WBC # BLD AUTO: 7.8 TH/MM3 (ref 4–11)

## 2018-05-12 PROCEDURE — G0378 HOSPITAL OBSERVATION PER HR: HCPCS

## 2018-05-12 PROCEDURE — 85025 COMPLETE CBC W/AUTO DIFF WBC: CPT

## 2018-05-12 PROCEDURE — 71045 X-RAY EXAM CHEST 1 VIEW: CPT

## 2018-05-12 PROCEDURE — 99285 EMERGENCY DEPT VISIT HI MDM: CPT

## 2018-05-12 PROCEDURE — 85610 PROTHROMBIN TIME: CPT

## 2018-05-12 PROCEDURE — 93005 ELECTROCARDIOGRAM TRACING: CPT

## 2018-05-12 PROCEDURE — 82552 ASSAY OF CPK IN BLOOD: CPT

## 2018-05-12 PROCEDURE — 84484 ASSAY OF TROPONIN QUANT: CPT

## 2018-05-12 PROCEDURE — 85730 THROMBOPLASTIN TIME PARTIAL: CPT

## 2018-05-12 PROCEDURE — 82550 ASSAY OF CK (CPK): CPT

## 2018-05-12 PROCEDURE — 80048 BASIC METABOLIC PNL TOTAL CA: CPT

## 2018-05-12 PROCEDURE — 78451 HT MUSCLE IMAGE SPECT SING: CPT

## 2018-05-12 PROCEDURE — A9502 TC99M TETROFOSMIN: HCPCS

## 2018-05-12 RX ADMIN — SODIUM CHLORIDE TAB 1 GM SCH GM: 1 TAB at 10:57

## 2018-05-12 RX ADMIN — SODIUM CHLORIDE TAB 1 GM SCH GM: 1 TAB at 16:31

## 2018-05-12 NOTE — HHI.HP
HPI


Primary Care Physician


Non-Staff


Chief Complaint


Chest pain


History of Present Illness


This is a 73-year-old male with history of CAD with stent of the LAD 2016 that presents to ED with complaint of chest discomfort.  Patient is hard 

of hearing.  Patient states that he has had chest pains for 6 or 7 years.  When 

asked how often it happens, patient states "a lot."  Nothing brings them on.  

He is living in a rehabilitation center at this time.  When asked what was 

different last night that has been over the last year or so, patient responds 

"sometimes it hurts more."  Last night hurt more.  When asked how long it last, 

patient states "I do not know."  His daughter is at the bedside.  States he is 

not following a cardiologist.  Upon reviewing records, patient had a 

nonischemic Lexiscan 17 but had an EF of 23%.  Cardiology was consulted, 

echo was obtained revealing an EF of 35-40%.  Recommended medication adjustment 

and follow-up and repeat echo.  His daughter states that he has not had this 

done.  Currently denies chest discomfort.





Review of Systems


General: Patient denies fevers, chills, and recent travel.


HEENT: Patient denies headache, sore throat, difficulty swallowing.  


Cardiovascular: Has the chest discomfort as mentioned above.  Denies sensation 

of heart beating rapidly or irregularly.  No syncope.  Denies diaphoresis.


Respiratory: Denies shortness of breath or inspirational chest discomfort.  

Denies coughing wheezing or hemoptysis.  


GI: Patient denies nausea, vomiting, diarrhea, abdominal pain, bloody stools.


Musculoskeletal: Patient denies joint pain or edema.  Denies calf pain or edema.


Neurovascular: Patient denies numbness, tingling, weakness in extremities.  

Denies headache.


Endocrine: Denies polyuria and polydipsia.


Hematologic: Denies easy bruising.


Skin: Denies rash or itching.





Past Family Social History


Allergies:  


Coded Allergies:  


     diatrizoate meglumine (Unverified  Allergy, Mild, HIVES, ITCHING, 18)


     gadobenic acid (Unverified  Allergy, Mild, HIVES, ITCHING, 18)


     gadodiamide (Unverified  Allergy, Mild, HIVES, ITCHING, 18)


     gadoteridol (Unverified  Allergy, Mild, HIVES, ITCHING, 18)


     iodixanol (Unverified  Allergy, Mild, HIVES, ITCHING, 18)


     iohexol (Unverified  Allergy, Mild, HIVES, ITCHING, 18)


Past Medical History


CAD with stent of LAD 2016.  He was a non-STEMI.  Hypertension, 

hyperlipidemia, COPD and continues to smoke,, GERD.  Denies diabetes.


Past Surgical History


Cardiac catheterization 2016.  Cataracts.


Reported Medications





Reported Meds & Active Scripts


Active


Famotidine 40 Mg Tab 40 Mg PO BID


Walker with Front Wheels (Device) 1 Mis Mis 1 Ea .ROUTE AS DIRECTED


Sodium Chloride 1 Gm Tab 1 Gm PO TID


Aspirin EC (Aspirin) 81 Mg Tabdr 81 Mg PO DAILY


Potassium Chloride Microencaps 20 Meq Tab 20 Meq PO Q12HR 10 Days


Reported


Voltaren (Diclofenac Sodium) 1 % Gel..gram.   


Seroquel (Quetiapine Fumarate) 100 Mg Tab 100 Mg PO HS


Remeron (Mirtazapine) 15 Mg Tab 7.5 Mg PO HS


Milk of Magnesia Liq (Magnesium Hydroxide) 400 Mg/5 Ml Susp 30 Ml PO ONCE


Maxitrol Opth Drops (Neomycin/Polymyxin/Dexamethasone) 3.5-10,000-0.1 Mg-Units-

% Susp Drop EACH EYE Q4H


Ibuprofen 400 Mg Tab 400 Mg PO Q8H PRN


Dulcolax Supp (Bisacodyl) 10 Mg Supp 10 Mg RECTAL DAILY PRN


Magnesium Citrate 100 Mg Tab 296 Mg PO PRN PRN


Metoprolol Succinate ER 24 HR (Metoprolol Succinate) 50 Mg Tab 50 Mg PO DAILY


Cetirizine (Cetirizine HCl) 10 Mg Tab 10 Mg PO DAILY


Amlodipine (Amlodipine Besylate) 2.5 Mg Tab 2.5 Mg PO DAILY


Atorvastatin (Atorvastatin Calcium) 80 Mg Tab 80 Mg PO HS


Active Ordered Medications





Current Medications








 Medications


  (Trade)  Dose


 Ordered  Sig/Kaia


 Route  Start Time


 Stop Time Status Last Admin


 


  (NS Flush)  2 ml  UNSCH  PRN


 IV FLUSH  18 04:15


     


 


 


  (NS Flush)  2 ml  BID


 IV FLUSH  18 09:00


    18 07:56


 


 


  (Restoril)  15 mg  HS  PRN


 PO  18 04:15


     


 


 


  (Xanax)  0.25 mg  Q8H  PRN


 PO  18 04:15


     


 








Family History


Does not know his family medical history.


Social History


Patient continues to smoke but does not know how much.  States he smoked all 

his life.  Denies alcohol or illicit drug use.





Physical Exam


Vital Signs





Vital Signs








  Date Time  Temp Pulse Resp B/P (MAP) Pulse Ox O2 Delivery O2 Flow Rate FiO2


 


18 07:15  97      


 


18 07:11 97.8 96 18 128/60 (82) 94   


 


18 07:11     93 Nasal Cannula 3.00 


 


18 05:03 98.4 109 18 135/74 (94) 96   


 


18 04:49     96 Nasal Cannula 3.00 


 


18 03:08     96 Nasal Cannula 2.00 


 


18 03:02     93 Nasal Cannula 2.00 


 


18 02:59 98.3   166/79 (108)    








Physical Exam


GENERAL: This is a well-nourished, well-developed patient, in no apparent 

distress.  Patient speaks in clear complete sentences.  Patient is pleasant.


HEENT: Head is atraumatic and normocephalic.  Neck is supple without 

lymphadenopathy and trachea is midline.  No JVD or carotid bruits.


CARDIOVASCULAR: Regular rate and rhythm without murmurs, gallops, or rubs. 


RESPIRATORY: Mild bilateral expiratory wheezing.  Breath sounds equal 

bilaterally. No rales, or rhonchi.  Chest wall is nontender.  No use of 

accessory muscles.


GASTROINTESTINAL: Abdomen is nontender, nondistended.  Abdomen soft.  No 

obvious pulsatile mass or bruit.  No CVA tenderness.  Strong femoral pulses 

bilaterally.  Normal bowel sounds in all quadrants.


MUSCULOSKELETAL: Patient is moving upper and lower extremities freely.  No calf 

tenderness or edema, no Homans sign.  Strong pulses in upper and lower 

extremities.


NEUROLOGICAL: Patient is alert and oriented.  Cranial nerves 2-12 are grossly 

intact.  No focal deficits and speech is clear.


SKIN: No rash and turgor is normal.


Laboratory





Laboratory Tests








Test


  18


03:00 18


04:42 18


07:08


 


White Blood Count 7.8   


 


Red Blood Count 4.44   


 


Hemoglobin 12.4   


 


Hematocrit 37.0   


 


Mean Corpuscular Volume 83.3   


 


Mean Corpuscular Hemoglobin 27.9   


 


Mean Corpuscular Hemoglobin


Concent 33.5 


  


  


 


 


Red Cell Distribution Width 15.5   


 


Platelet Count 186   


 


Mean Platelet Volume 7.7   


 


Neutrophils (%) (Auto) 81.5   


 


Lymphocytes (%) (Auto) 11.3   


 


Monocytes (%) (Auto) 5.7   


 


Eosinophils (%) (Auto) 1.3   


 


Basophils (%) (Auto) 0.2   


 


Neutrophils # (Auto) 6.4   


 


Lymphocytes # (Auto) 0.9   


 


Monocytes # (Auto) 0.4   


 


Eosinophils # (Auto) 0.1   


 


Basophils # (Auto) 0.0   


 


CBC Comment DIFF FINAL   


 


Differential Comment    


 


Prothrombin Time 10.5   


 


Prothromb Time International


Ratio 1.0 


  


  


 


 


Activated Partial


Thromboplast Time 24.8 


  


  


 


 


Blood Urea Nitrogen 8   


 


Creatinine 1.00   


 


Random Glucose 137   


 


Calcium Level 8.4   


 


Sodium Level 140   


 


Potassium Level 3.6   


 


Chloride Level 104   


 


Carbon Dioxide Level 28.9   


 


Anion Gap 7   


 


Estimat Glomerular Filtration


Rate 89 


  


  


 


 


Total Creatine Kinase 85  104  


 


Troponin I LESS THAN 0.02  LESS THAN 0.02  


 


Creatine Kinase MB  3.7  








Result Diagram:  


18





Imaging





Last 48 hours Impressions








Chest X-Ray 18 Signed





Impressions: 





 Service Date/Time:  Saturday, May 12, 2018 03:22 - CONCLUSION: No acute 

disease. 





       Ron Malloy MD 








Course


EKGs sinus rhythm to sinus tachycardia rate 105 without significant ST segment 

depressions or elevations.





Caprini VTE Risk Assessment


Caprini VTE Risk Assessment:  Mod/High Risk (score >= 2)


Caprini Risk Assessment Model











 Point Value = 1          Point Value = 2  Point Value = 3  Point Value = 5


 


Age 41-60


Minor surgery


BMI > 25 kg/m2


Swollen legs


Varicose veins


Pregnancy or postpartum


History of unexplained or recurrent


   spontaneous 


Oral contraceptives or hormone


   replacement


Sepsis (< 1 month)


Serious lung disease, including


   pneumonia (< 1 month)


Abnormal pulmonary function


Acute myocardial infarction


Congestive heart failure (< 1 month)


History of inflammatory bowel disease


Medical patient at bed rest Age 61-74


Arthroscopic surgery


Major open surgery (> 45 min)


Laparoscopic surgery (> 45 min)


Malignancy


Confined to bed (> 72 hours)


Immobilizing plaster cast


Central venous access Age >= 75


History of VTE


Family history of VTE


Factor V Leiden


Prothrombin 24260G


Lupus anticoagulant


Anticardiolipin antibodies


Elevated serum homocysteine


Heparin-induced thrombocytopenia


Other congenital or acquired


   thrombophilia Stroke (< 1 month)


Elective arthroplasty


Hip, pelvis, or leg fracture


Acute spinal cord injury (< 1 month)








Prophylaxis Regimen











   Total Risk


Factor Score Risk Level Prophylaxis Regimen


 


0-1      Low Early ambulation


 


2 Moderate Order ONE of the following:


*Sequential Compression Device (SCD)


*Heparin 5000 units SQ BID


 


3-4 Higher Order ONE of the following medications:


*Heparin 5000 units SQ TID


*Enoxaparin/Lovenox 40 mg SQ daily (WT < 150 kg, CrCl > 30 mL/min)


*Enoxaparin/Lovenox 30 mg SQ daily (WT < 150 kg, CrCl > 10-29 mL/min)


*Enoxaparin/Lovenox 30 mg SQ BID (WT < 150 kg, CrCl > 30 mL/min)


AND/OR


*Sequential Compression Device (SCD)


 


5 or more Highest Order ONE of the following medications:


*Heparin 5000 units SQ TID (Preferred with Epidurals)


*Enoxaparin/Lovenox 40 mg SQ daily (WT < 150 kg, CrCl > 30 mL/min)


*Enoxaparin/Lovenox 30 mg SQ daily (WT < 150 kg, CrCl > 10-29 mL/min)


*Enoxaparin/Lovenox 30 mg SQ BID (WT < 150 kg, CrCl > 30 mL/min)


AND


*Sequential Compression Device (SCD)











Assessment and Plan


Assessment and Plan


* Chest pain: Patient's third troponin is pending.  If that were to be normal 

he was then be scheduled for a Lexiscan.  He will be seen by Dr. Lala of 

cardiology in the chest pain center.  Patient be discharged home if stress test 

is nonischemic with instructions to follow-up with PCP and cardiology.  Return 

to ED for interval issues.


* CAD: We will likely reassess with stress test.


* Hypertension: Continue medication.


* Hyperlipidemia: Continue medication.


* COPD: Patient has been counseled on importance of smoking cessation.  There 

will be as needed duo nebs.


* Tobacco abuse: Patient has been counseled importance of smoking cessation.


Patient is stable at this time.  He is agreeable to this plan.











Bryan Tadeo May 12, 2018 08:29

## 2018-05-12 NOTE — RADRPT
EXAM DATE/TIME:  05/12/2018 13:38 

 

[facility] COMPARISON:     

No previous studies available for comparison.

 

 

INDICATIONS :     

*** Chest pain. Angina 

                       

 

DOSE:      

8.5 mCi Tc99m Myoview

                       

                       

 

MEDICAL HISTORY :     

Chronic obstructive pulmonary disease. Hypertension. Diabetes mellitus type 2. 

 

SURGICAL HISTORY :      

Coronary artery stent.   

 

ENCOUNTER:     

Initial

 

ACUITY:     

1 day

 

PAIN SCALE:     

2/10

 

LOCATION:      

Bilateral chest 

 

TECHNIQUE:     

Resting injection SPECT was performed.  Examination was performed on a SPECT/CT scanner.  Attenuation
 corrected and non-attenuation correction images were reviewed. 

 

FINDINGS:     

The patient refused the stress portion of the examination. There, this is nondiagnostic for ischemic 
changes. The rest images show some mild diminished perfusion along the inferior lateral wall.

 

CONCLUSION:     

1. Nondiagnostic exam. Patient refused the stress portion of the study.

 

RISK CATEGORY:     

NA

 

 

 

 

 Nnamdi Jose MD on May 12, 2018 at 16:58           

Board Certified Radiologist.

 This report was verified electronically.

## 2018-05-12 NOTE — PD.CARD.PN
Subjective


Subjective Remarks


The patient was seen and examined personally, discussed with the PA, medical 

records were reviewed and plan treatment was discussed.


I am in agreement with the dictation is recorded.


Patient will be ruled out using standard chest pain protocol and a nuclear 

stress testing will be carried out for definitive evaluation.


This was discussed with the patient and with his daughter who is here as he his 

primary care provider.





Objective


Medications





Current Medications








 Medications


  (Trade)  Dose


 Ordered  Sig/Kaia


 Route  Start Time


 Stop Time Status Last Admin


 


  (NS Flush)  2 ml  UNSCH  PRN


 IV FLUSH  5/12/18 04:15


     


 


 


  (NS Flush)  2 ml  BID


 IV FLUSH  5/12/18 09:00


    5/12/18 07:56


 


 


  (Restoril)  15 mg  HS  PRN


 PO  5/12/18 04:15


     


 


 


  (Xanax)  0.25 mg  Q8H  PRN


 PO  5/12/18 04:15


     


 


 


  (Norvasc)  2.5 mg  DAILY


 PO  5/12/18 09:00


     


 


 


  (Lipitor)  80 mg  HS


 PO  5/12/18 21:00


     


 


 


  (ZyrTEC)  10 mg  DAILY


 PO  5/12/18 09:00


     


 


 


  (Motrin)  400 mg  Q8H  PRN


 PO  5/12/18 09:15


     


 


 


  (Toprol Xl)  50 mg  DAILY


 PO  5/12/18 09:00


     


 


 


  (Remeron)  7.5 mg  HS


 PO  5/12/18 21:00


     


 


 


  (KCl)  20 meq  Q12HR


 PO  5/12/18 09:00


     


 


 


  (SEROquel)  100 mg  HS


 PO  5/12/18 21:00


     


 


 


  (Sodium Chloride)  1 gm  TID


 PO  5/12/18 09:00


     


 


 


  (Pepcid)  40 mg  BID


 PO  5/12/18 09:00


     


 


 


  (Ecotrin Ec)  81 mg  DAILY


 PO  5/12/18 09:00


     


 


 


  (Duoneb Neb)  1 ampule  Q4HR NEB  PRN


 INH  5/12/18 09:00


     


 








Vital Signs / I&O





Vital Signs








  Date Time  Temp Pulse Resp B/P (MAP) Pulse Ox O2 Delivery O2 Flow Rate FiO2


 


5/12/18 07:15  97      


 


5/12/18 07:11 97.8 96 18 128/60 (82) 94   


 


5/12/18 07:11     93 Nasal Cannula 3.00 


 


5/12/18 05:03 98.4 109 18 135/74 (94) 96   


 


5/12/18 04:49     96 Nasal Cannula 3.00 


 


5/12/18 03:08     96 Nasal Cannula 2.00 


 


5/12/18 03:02     93 Nasal Cannula 2.00 


 


5/12/18 02:59 98.3   166/79 (108)    








Physical Exam


Frail thin black gentleman who is very hard of hearing.  


Neck is supple no JVD masses nodes or bruits


Chest reveals significantly diminished breath sounds but no rales wheezes or 

rhonchi


Cardiovascular heart sounds are distant but there are no gallop rub or murmur


Laboratory





Laboratory Tests








Test


  5/12/18


03:00 5/12/18


04:42 5/12/18


07:08


 


White Blood Count 7.8 TH/MM3   


 


Red Blood Count 4.44 MIL/MM3   


 


Hemoglobin 12.4 GM/DL   


 


Hematocrit 37.0 %   


 


Mean Corpuscular Volume 83.3 FL   


 


Mean Corpuscular Hemoglobin 27.9 PG   


 


Mean Corpuscular Hemoglobin


Concent 33.5 % 


  


  


 


 


Red Cell Distribution Width 15.5 %   


 


Platelet Count 186 TH/MM3   


 


Mean Platelet Volume 7.7 FL   


 


Neutrophils (%) (Auto) 81.5 %   


 


Lymphocytes (%) (Auto) 11.3 %   


 


Monocytes (%) (Auto) 5.7 %   


 


Eosinophils (%) (Auto) 1.3 %   


 


Basophils (%) (Auto) 0.2 %   


 


Neutrophils # (Auto) 6.4 TH/MM3   


 


Lymphocytes # (Auto) 0.9 TH/MM3   


 


Monocytes # (Auto) 0.4 TH/MM3   


 


Eosinophils # (Auto) 0.1 TH/MM3   


 


Basophils # (Auto) 0.0 TH/MM3   


 


CBC Comment DIFF FINAL   


 


Differential Comment    


 


Prothrombin Time 10.5 SEC   


 


Prothromb Time International


Ratio 1.0 RATIO 


  


  


 


 


Activated Partial


Thromboplast Time 24.8 SEC 


  


  


 


 


Blood Urea Nitrogen 8 MG/DL   


 


Creatinine 1.00 MG/DL   


 


Random Glucose 137 MG/DL   


 


Calcium Level 8.4 MG/DL   


 


Sodium Level 140 MEQ/L   


 


Potassium Level 3.6 MEQ/L   


 


Chloride Level 104 MEQ/L   


 


Carbon Dioxide Level 28.9 MEQ/L   


 


Anion Gap 7 MEQ/L   


 


Estimat Glomerular Filtration


Rate 89 ML/MIN 


  


  


 


 


Total Creatine Kinase 85 U/L  104 U/L  113 U/L 


 


Troponin I


  LESS THAN 0.02


NG/ML LESS THAN 0.02


NG/ML LESS THAN 0.02


NG/ML


 


Creatine Kinase MB  3.7 NG/ML  5.4 NG/ML 








Imaging





Last 24 hours Impressions








Chest X-Ray 5/12/18 0306 Signed





Impressions: 





 Service Date/Time:  Saturday, May 12, 2018 03:22 - CONCLUSION: No acute 

disease. 





       Ron Malloy MD 











Assessment and Plan


Problem List:  


(1) Chest pain


ICD Codes:  R07.9 - Chest pain, unspecified


Status:  Acute


(2) COPD exacerbation


ICD Codes:  J44.1 - Chronic obstructive pulmonary disease with (acute) 

exacerbation


Status:  Acute


(3) Dementia


ICD Codes:  F03.90 - Unspecified dementia without behavioral disturbance


Status:  Acute


(4) Tobacco abuse


ICD Codes:  Z72.0 - Tobacco use


Status:  Acute





Problem Qualifiers





(1) Chest pain:  


Qualified Codes:  R07.9 - Chest pain, unspecified








Alverto Lala MD May 12, 2018 09:31

## 2018-05-12 NOTE — EKG
Date Performed: 05/12/2018       Time Performed: 07:22:08

 

PTAGE:      73 years

 

EKG:      Sinus rhythm 

 

 MARKED LEFT AXIS DEVIATION NONSPECIFIC ST & T-WAVE ABNORMALITY ABNORMAL ECG

 

PREVIOUS TRACING       : 05/12/2018 05.13 Since the previous tracing, no significant change noted

 

DOCTOR:   Kameron Geiger  Interpretating Date/Time  05/12/2018 18:13:21

## 2018-05-12 NOTE — EKG
Date Performed: 05/12/2018       Time Performed: 04:36:04

 

PTAGE:      73 years

 

EKG:      SINUS TACHYCARDIA NONSPECIFIC ST & T-WAVE ABNORMALITY ABNORMAL RHYTHM ECG Probable old ante
roseptal MI but largely unchanged from prior tracing 

 

 PREVIOUS TRACING            : 05/12/2018 03.03

 

DOCTOR:   Alverto Lala  Interpretating Date/Time  05/12/2018 09:36:38

## 2018-05-12 NOTE — EKG
Date Performed: 05/12/2018       Time Performed: 03:03:55

 

PTAGE:      73 years

 

EKG:      SINUS TACHYCARDIA BORDERLINE LEFT AXIS DEVIATION NONSPECIFIC ST & T-WAVE ABNORMALITY ABNORM
AL RHYTHM ECG Old anterior septal MI but largely unchanged 

 

 PREVIOUS TRACING            : 12/07/2017 20.44

 

DOCTOR:   Alverto Lala  Interpretating Date/Time  05/12/2018 09:36:57

## 2018-05-12 NOTE — EKG
Date Performed: 05/12/2018       Time Performed: 05:13:23

 

PTAGE:      73 years

 

EKG:      SINUS TACHYCARDIA SEPTAL MYOCARDIAL INFARCTION ABNORMAL ECG No significant change 

 

 PREVIOUS TRACING            : 05/12/2018 04.36

 

DOCTOR:   Alverto Lala  Interpretating Date/Time  05/12/2018 09:36:06

## 2018-05-12 NOTE — HHI.DCPOC
Discharge Care Plan


Diagnosis:  


(1) Chest pain


(2) Hypertension


(3) Hyperlipidemia


(4) COPD exacerbation


(5) CAD (coronary artery disease)


(6) H/O heart artery stent


Goals to Promote Your Health


* To prevent worsening of your condition and complications


* To maintain your health at the optimal level


Directions to Meet Your Goals


*** Take your medications as prescribed


*** Follow your dietary instruction


*** Follow activity as directed








*** Keep your appointments as scheduled


*** Take your immunizations and boosters as scheduled


*** If your symptoms worsen call your PCP, if no PCP go to Urgent Care Center 

or Emergency Room***


*** Smoking is Dangerous to Your Health. Avoid second hand smoke***


***Call the 24-hour hour crisis hotline for domestic abuse at 1-992.858.3572***











Bryan Tadeo May 12, 2018 16:55

## 2018-05-12 NOTE — PD
HPI


Chief Complaint:  Chest Pain


Time Seen by Provider:  03:00


Travel History


International Travel<30 days:  No


Contact w/Intl Traveler<30days:  No


Traveled to known affect area:  No





History of Present Illness


HPI


73-year-old male complains of chest pain for the past for 5 days.  He states he 

has had chronic intermittent chest pain for years now.  He reports typically 

nitro is sufficient for pain control.  He denies shortness of breath.  Location 

is retrosternal.  There is radiation to both shoulders.  He has no fever or 

cough.  Patient has a history of COPD, coronary artery disease, hypertension, 

hyperlipidemia, diabetes and tobaccoism.  History is limited by the fact that 

the patient is quite hard of hearing and blind.





PFSH


Past Medical History


Arthritis:  Yes


Asthma:  Yes


Cancer:  No


Cardiovascular Problems:  Yes


High Cholesterol:  Yes


Chest Pain:  Yes


COPD:  Yes


Diabetes:  Yes


Patient Takes Glucophage:  No


Diminished Hearing:  No


Gastrointestinal Disorders:  No


Glaucoma:  Yes


Genitourinary:  Yes


Hypertension:  Yes


Immune Disorder:  No


Insomnia:  Yes


Musculoskeletal:  Yes


Neurologic:  Yes (HX OF AMS BUT NO DEMENTIA NOTED)


Psychiatric:  No


Respiratory:  Yes (COPD)





Past Surgical History


Ear Surgery:  Yes (BILATERAL CATARACT REMOVAL)


Eye Surgery:  Yes (CATARACT REMOVAL)





Social History


Alcohol Use:  No


Tobacco Use:  Yes (3 cig per week)


Substance Use:  No





Allergies-Medications


(Allergen,Severity, Reaction):  


Coded Allergies:  


     diatrizoate meglumine (Unverified  Allergy, Mild, HIVES, ITCHING, 5/12/18)


     gadobenic acid (Unverified  Allergy, Mild, HIVES, ITCHING, 5/12/18)


     gadodiamide (Unverified  Allergy, Mild, HIVES, ITCHING, 5/12/18)


     gadoteridol (Unverified  Allergy, Mild, HIVES, ITCHING, 5/12/18)


     iodixanol (Unverified  Allergy, Mild, HIVES, ITCHING, 5/12/18)


     iohexol (Unverified  Allergy, Mild, HIVES, ITCHING, 5/12/18)


Reported Meds & Prescriptions





Reported Meds & Active Scripts


Active


Famotidine 40 Mg Tab 40 Mg PO BID


Walker with Front Wheels (Device) 1 Mis Mis 1 Ea .ROUTE AS DIRECTED


Sodium Chloride 1 Gm Tab 1 Gm PO TID


Aspirin EC (Aspirin) 81 Mg Tabdr 81 Mg PO DAILY


Potassium Chloride Microencaps 20 Meq Tab 20 Meq PO Q12HR 10 Days


Reported


Voltaren (Diclofenac Sodium) 1 % Gel..gram.   


Seroquel (Quetiapine Fumarate) 100 Mg Tab 100 Mg PO HS


Remeron (Mirtazapine) 15 Mg Tab 7.5 Mg PO HS


Milk of Magnesia Liq (Magnesium Hydroxide) 400 Mg/5 Ml Susp 30 Ml PO ONCE


Maxitrol Opth Drops (Neomycin/Polymyxin/Dexamethasone) 3.5-10,000-0.1 Mg-Units-

% Susp Drop EACH EYE Q4H


Ibuprofen 400 Mg Tab 400 Mg PO Q8H PRN


Dulcolax Supp (Bisacodyl) 10 Mg Supp 10 Mg RECTAL DAILY PRN


Magnesium Citrate 100 Mg Tab 296 Mg PO PRN PRN


Metoprolol Succinate ER 24 HR (Metoprolol Succinate) 50 Mg Tab 50 Mg PO DAILY


Cetirizine (Cetirizine HCl) 10 Mg Tab 10 Mg PO DAILY


Amlodipine (Amlodipine Besylate) 2.5 Mg Tab 2.5 Mg PO DAILY


Atorvastatin (Atorvastatin Calcium) 80 Mg Tab 80 Mg PO HS








Review of Systems


Except as stated in HPI:  all other systems reviewed are Neg


General / Constitutional:  No: Fever





Physical Exam


Narrative


GENERAL73-year-old male well-nourished well-developed no acute distress





Vital Signs








  Date Time  Temp Pulse Resp B/P (MAP) Pulse Ox O2 Delivery O2 Flow Rate FiO2


 


5/12/18 03:08     96 Nasal Cannula 2.00 


 


5/12/18 03:02     93 Nasal Cannula 2.00 


 


5/12/18 02:59 98.3   166/79 (108)    





Respiratory rate is about 12 


pulse is about 110-116 


SKIN: Warm and dry.


HEAD: Atraumatic. Normocephalic. 


EYES: Pupils equal and round. No scleral icterus. No injection or drainage. 


ENT: No nasal bleeding or discharge.  Mucous membranes pink and moist.


NECK: Trachea midline. No JVD. 


CARDIOVASCULAR: Regular rate and rhythm.  


RESPIRATORY: No accessory muscle use. Clear to auscultation. Breath sounds 

equal bilaterally. 


GASTROINTESTINAL: Abdomen soft, non-tender, nondistended. Hepatic and splenic 

margins not palpable. 


MUSCULOSKELETAL: Extremities without clubbing, cyanosis, or edema. No obvious 

deformities. 


NEUROLOGICAL: Awake and alert. No obvious cranial nerve deficits.  Motor 

grossly within normal limits. Five out of 5 muscle strength in the arms and 

legs.  Normal speech.


PSYCHIATRIC: Appropriate mood and affect; insight and judgment normal.





Data


Data


Last Documented VS





Vital Signs








  Date Time  Temp Pulse Resp B/P (MAP) Pulse Ox O2 Delivery O2 Flow Rate FiO2


 


5/12/18 03:08     96 Nasal Cannula 2.00 


 


5/12/18 02:59 98.3   166/79 (108)    








Orders





 Orders


Electrocardiogram (5/12/18 03:06)


Complete Blood Count With Diff (5/12/18 03:06)


Basic Metabolic Panel (Bmp) (5/12/18 03:06)


Ckmb (Isoenzyme) Profile (5/12/18 03:06)


Troponin I (5/12/18 03:06)


Chest, Single Ap (5/12/18 03:06)


Iv Access Insert/Monitor (5/12/18 03:06)


Ecg Monitoring (5/12/18 03:06)


Oxygen Administration (5/12/18 03:06)


Oximetry (5/12/18 03:06)


Prothrombin Time / Inr (Pt) (5/12/18 03:06)


Act Partial Throm Time (Ptt) (5/12/18 03:06)


Activity Bed Rest With Brp (5/12/18 04:10)


Vital Signs (Adult) Q4H (5/12/18 04:10)


Cardiac Rhythm .As Directed (5/12/18 04:10)


Notify Dr: Other .PRN (5/12/18 04:10)


Notify  Parameters (5/12/18 04:10)


Resp Oxygen Nasal Cannula (5/12/18 )


Diet Npo (5/12/18 Breakfast)


Ckmb (Isoenzyme) Profile (5/12/18 04:10)


Ckmb (Isoenzyme) Profile (5/12/18 07:10)


Troponin I (5/12/18 04:10)


Troponin I (5/12/18 07:10)


Electrocardiogram (5/12/18 04:10)


Electrocardiogram (5/12/18 07:10)


^ Obtain (5/12/18 04:10)


Sodium Chloride 0.9% Flush (Ns Flush) (5/12/18 04:15)


Sodium Chloride 0.9% Flush (Ns Flush) (5/12/18 09:00)


Temazepam (Restoril) (5/12/18 04:15)


Alprazolam (Xanax) (5/12/18 04:15)


Cardiac Monitor / Telemetry SONIA.Q8H (5/12/18 04:10)


Admit Order (Ed Use Only) (5/12/18 04:10)





Labs





Laboratory Tests








Test


  5/12/18


03:00


 


White Blood Count 7.8 TH/MM3 


 


Red Blood Count 4.44 MIL/MM3 


 


Hemoglobin 12.4 GM/DL 


 


Hematocrit 37.0 % 


 


Mean Corpuscular Volume 83.3 FL 


 


Mean Corpuscular Hemoglobin 27.9 PG 


 


Mean Corpuscular Hemoglobin


Concent 33.5 % 


 


 


Red Cell Distribution Width 15.5 % 


 


Platelet Count 186 TH/MM3 


 


Mean Platelet Volume 7.7 FL 


 


Neutrophils (%) (Auto) 81.5 % 


 


Lymphocytes (%) (Auto) 11.3 % 


 


Monocytes (%) (Auto) 5.7 % 


 


Eosinophils (%) (Auto) 1.3 % 


 


Basophils (%) (Auto) 0.2 % 


 


Neutrophils # (Auto) 6.4 TH/MM3 


 


Lymphocytes # (Auto) 0.9 TH/MM3 


 


Monocytes # (Auto) 0.4 TH/MM3 


 


Eosinophils # (Auto) 0.1 TH/MM3 


 


Basophils # (Auto) 0.0 TH/MM3 


 


CBC Comment DIFF FINAL 


 


Differential Comment  


 


Prothrombin Time 10.5 SEC 


 


Prothromb Time International


Ratio 1.0 RATIO 


 


 


Activated Partial


Thromboplast Time 24.8 SEC 


 


 


Blood Urea Nitrogen 8 MG/DL 


 


Creatinine 1.00 MG/DL 


 


Random Glucose 137 MG/DL 


 


Calcium Level 8.4 MG/DL 


 


Sodium Level 140 MEQ/L 


 


Potassium Level 3.6 MEQ/L 


 


Chloride Level 104 MEQ/L 


 


Carbon Dioxide Level 28.9 MEQ/L 


 


Anion Gap 7 MEQ/L 


 


Estimat Glomerular Filtration


Rate 89 ML/MIN 


 


 


Total Creatine Kinase 85 U/L 


 


Troponin I


  LESS THAN 0.02


NG/ML











MDM


Medical Decision Making


Medical Screen Exam Complete:  Yes


Emergency Medical Condition:  Yes


Medical Record Reviewed:  Yes


Differential Diagnosis


NSTEMI, unstable angina, coronary vasospasm, PE, PTX, aortic dissection, 

pericarditis, myocarditis, endocarditis, PNA, esophageal disease, aneurysm, 

musculoskeletal etiologies, anxiety, cocaine/sympathomimetic abuse


Narrative Course


CBC & BMP Diagram


5/12/18 03:00








Calcium Level 8.4 L





Troponin undetectable


EKG shows a sinus rhythm at a rate of 116 with diffuse ST changes, essentially 

unchanged morphology from December 2017, about 6 months ago





Last Impressions








Chest X-Ray 5/12/18 0306 Signed





Impressions: 





 Service Date/Time:  Saturday, May 12, 2018 03:22 - CONCLUSION: No acute 

disease. 





       Ron Malloy MD 





Overall patient is chest pain is somewhat nonspecific however coronary disease 

is a consideration as nitro has been effective for the pain management.  

Patient has a high IVELISSE risk score.  Admission for chest pain center evaluation 

considered next best step.





Diagnosis





 Primary Impression:  


 Chest pain


 Qualified Codes:  R07.9 - Chest pain, unspecified





Admitting Information


Admitting Physician Requests:  Observation











Geoffrey Cristina MD May 12, 2018 04:02

## 2018-05-12 NOTE — RADRPT
EXAM DATE/TIME:  05/12/2018 03:22 

 

[facility] COMPARISON:     

CHEST SINGLE AP, December 07, 2017, 20:27.

 

                     

INDICATIONS :     

Chest pain.

                     

 

MEDICAL HISTORY :     

Cardiovascular disease.  Diabetes mellitus type II.  Hypertension.      

 

SURGICAL HISTORY :     

None.   

 

ENCOUNTER:     

Initial                                        

 

ACUITY:     

1 day      

 

PAIN SCORE:     

1/10

 

LOCATION:     

Bilateral chest 

 

FINDINGS:     

A single view of the chest demonstrates the lungs to be symmetrically hyperinflated without evidence 
of mass, infiltrate or effusion.  The cardiomediastinal contours are unremarkable.  Osseous structure
s are intact.

 

CONCLUSION:     No acute disease.  

 

 

 

 Ron Malloy MD on May 12, 2018 at 3:32           

Board Certified Radiologist.

 This report was verified electronically.